# Patient Record
Sex: FEMALE | Race: BLACK OR AFRICAN AMERICAN | Employment: OTHER | URBAN - NONMETROPOLITAN AREA
[De-identification: names, ages, dates, MRNs, and addresses within clinical notes are randomized per-mention and may not be internally consistent; named-entity substitution may affect disease eponyms.]

---

## 2020-10-17 ENCOUNTER — HOSPITAL ENCOUNTER (OUTPATIENT)
Dept: LAB | Age: 75
Discharge: HOME | End: 2020-10-17
Payer: MEDICARE

## 2020-10-17 DIAGNOSIS — I11.0 HYPERTENSIVE HEART DISEASE WITH CONGESTIVE HEART FAILURE (HCC): ICD-10-CM

## 2020-10-17 LAB
ANION GAP SERPL CALC-SCNC: 12 MMOL/L
BASOPHILS # BLD: 0 K/UL (ref 0–0.1)
BASOPHILS NFR BLD: 0 % (ref 0–2)
BUN SERPL-MCNC: 16 MG/DL (ref 9–21)
BUN/CREAT SERPL: 8
CA-I BLD-MCNC: 7.7 MG/DL (ref 8.5–10.5)
CHLORIDE SERPL-SCNC: 92 MMOL/L (ref 94–111)
CO2 SERPL-SCNC: 36 MMOL/L (ref 21–33)
CREAT SERPL-MCNC: 2 MG/DL (ref 0.7–1.2)
EOSINOPHIL # BLD: 0.3 K/UL (ref 0–0.4)
EOSINOPHIL NFR BLD: 3 % (ref 0–5)
ERYTHROCYTE [DISTWIDTH] IN BLOOD BY AUTOMATED COUNT: 17.3 % (ref 11.6–14.5)
GLUCOSE SERPL-MCNC: 61 MG/DL (ref 70–110)
HCT VFR BLD AUTO: 36.5 % (ref 35–45)
HGB BLD-MCNC: 11 G/DL (ref 12–16)
IMM GRANULOCYTES # BLD AUTO: 0 K/UL
IMM GRANULOCYTES NFR BLD AUTO: 0 %
LYMPHOCYTES # BLD: 1.4 K/UL (ref 0.9–3.6)
LYMPHOCYTES NFR BLD: 13 % (ref 21–52)
MCH RBC QN AUTO: 27.6 PG (ref 24–34)
MCHC RBC AUTO-ENTMCNC: 30.1 G/DL (ref 31–37)
MCV RBC AUTO: 91.7 FL (ref 74–97)
MONOCYTES # BLD: 0.8 K/UL (ref 0.05–1.2)
MONOCYTES NFR BLD: 8 % (ref 3–10)
NEUTS SEG # BLD: 8 K/UL (ref 1.8–8)
NEUTS SEG NFR BLD: 76 % (ref 40–73)
PLATELET # BLD AUTO: 223 K/UL (ref 135–420)
PMV BLD AUTO: 10.9 FL (ref 9.2–11.8)
POTASSIUM SERPL-SCNC: 4.2 MMOL/L (ref 3.2–5.1)
RBC # BLD AUTO: 3.98 M/UL (ref 4.2–5.3)
SODIUM SERPL-SCNC: 140 MMOL/L (ref 135–145)
WBC # BLD AUTO: 10.6 K/UL (ref 4.6–13.2)

## 2020-10-17 PROCEDURE — 85025 COMPLETE CBC W/AUTO DIFF WBC: CPT

## 2020-10-17 PROCEDURE — 36415 COLL VENOUS BLD VENIPUNCTURE: CPT

## 2020-10-17 PROCEDURE — 80048 BASIC METABOLIC PNL TOTAL CA: CPT

## 2020-10-19 ENCOUNTER — TRANSCRIBE ORDER (OUTPATIENT)
Dept: REGISTRATION | Age: 75
End: 2020-10-19

## 2020-10-19 DIAGNOSIS — I11.0 HYPERTENSIVE HEART DISEASE WITH CONGESTIVE HEART FAILURE (HCC): Primary | ICD-10-CM

## 2023-05-25 ENCOUNTER — APPOINTMENT (OUTPATIENT)
Age: 78
DRG: 194 | End: 2023-05-25
Payer: COMMERCIAL

## 2023-05-25 ENCOUNTER — HOSPITAL ENCOUNTER (INPATIENT)
Age: 78
LOS: 4 days | Discharge: HOME | DRG: 194 | End: 2023-05-29
Attending: EMERGENCY MEDICINE | Admitting: INTERNAL MEDICINE
Payer: COMMERCIAL

## 2023-05-25 DIAGNOSIS — I50.9 ACUTE ON CHRONIC CONGESTIVE HEART FAILURE, UNSPECIFIED HEART FAILURE TYPE (HCC): ICD-10-CM

## 2023-05-25 DIAGNOSIS — J96.22 ACUTE ON CHRONIC RESPIRATORY FAILURE WITH HYPOXIA AND HYPERCAPNIA (HCC): Primary | ICD-10-CM

## 2023-05-25 DIAGNOSIS — J96.21 ACUTE ON CHRONIC RESPIRATORY FAILURE WITH HYPOXIA AND HYPERCAPNIA (HCC): Primary | ICD-10-CM

## 2023-05-25 DIAGNOSIS — N30.01 ACUTE CYSTITIS WITH HEMATURIA: ICD-10-CM

## 2023-05-25 PROBLEM — E66.9 DIABETES MELLITUS TYPE 2 IN OBESE (HCC): Status: ACTIVE | Noted: 2023-05-25

## 2023-05-25 PROBLEM — E11.69 DIABETES MELLITUS TYPE 2 IN OBESE (HCC): Status: ACTIVE | Noted: 2023-05-25

## 2023-05-25 PROBLEM — J96.00 ACUTE RESPIRATORY FAILURE (HCC): Status: ACTIVE | Noted: 2023-05-25

## 2023-05-25 PROBLEM — I50.33 ACUTE ON CHRONIC DIASTOLIC HEART FAILURE (HCC): Status: ACTIVE | Noted: 2023-05-25

## 2023-05-25 PROBLEM — E78.5 HYPERLIPIDEMIA: Status: ACTIVE | Noted: 2023-05-25

## 2023-05-25 PROBLEM — N30.00 ACUTE CYSTITIS: Status: ACTIVE | Noted: 2023-05-25

## 2023-05-25 LAB
ALBUMIN SERPL-MCNC: 2.7 G/DL (ref 3.4–5)
ALBUMIN/GLOB SERPL: 0.4 (ref 0.8–1.7)
ALP SERPL-CCNC: 107 U/L (ref 45–117)
ALT SERPL-CCNC: 12 U/L (ref 13–56)
AMPHET UR QL SCN: NEGATIVE
ANION GAP SERPL CALC-SCNC: 7 MMOL/L (ref 3–18)
APPEARANCE UR: ABNORMAL
ARTERIAL PATENCY WRIST A: YES
ARTERIAL PATENCY WRIST A: YES
AST SERPL W P-5'-P-CCNC: 14 U/L (ref 10–38)
BACTERIA URNS QL MICRO: ABNORMAL /HPF
BARBITURATES UR QL SCN: NEGATIVE
BASE EXCESS BLDA CALC-SCNC: 1.7 MMOL/L (ref 0–3)
BASE EXCESS BLDA CALC-SCNC: 4.4 MMOL/L (ref 0–3)
BASOPHILS # BLD: 0 K/UL (ref 0–0.1)
BASOPHILS NFR BLD: 0 % (ref 0–2)
BDY SITE: ABNORMAL
BDY SITE: ABNORMAL
BENZODIAZ UR QL: NEGATIVE
BILIRUB SERPL-MCNC: 0.3 MG/DL (ref 0.2–1)
BILIRUB UR QL: NEGATIVE
BNP SERPL-MCNC: 5331 PG/ML (ref 0–1800)
BREATHS.SPONTANEOUS ON VENT: 22
BREATHS.SPONTANEOUS ON VENT: 24
BUN SERPL-MCNC: 28 MG/DL (ref 7–18)
BUN/CREAT SERPL: 14 (ref 12–20)
CA-I BLD-MCNC: 8 MG/DL (ref 8.5–10.1)
CANNABINOIDS UR QL SCN: NEGATIVE
CHLORIDE SERPL-SCNC: 101 MMOL/L (ref 100–111)
CO2 SERPL-SCNC: 29 MMOL/L (ref 21–32)
COCAINE UR QL SCN: NEGATIVE
COHGB MFR BLD: 1.4 % (ref 1–2)
COHGB MFR BLD: 2.5 % (ref 1–2)
COLOR UR: YELLOW
CREAT SERPL-MCNC: 2 MG/DL (ref 0.6–1.3)
DIFFERENTIAL METHOD BLD: ABNORMAL
EOSINOPHIL # BLD: 0.2 K/UL (ref 0–0.4)
EOSINOPHIL NFR BLD: 2 % (ref 0–5)
EPITH CASTS URNS QL MICRO: ABNORMAL /LPF (ref 0–20)
ERYTHROCYTE [DISTWIDTH] IN BLOOD BY AUTOMATED COUNT: 16 % (ref 11.6–14.5)
ETHANOL SERPL-MCNC: <3 MG/DL (ref 0–3)
FENTANYL UR QL SCN: NEGATIVE
FIO2 ON VENT: 30 %
FIO2 ON VENT: 40 %
GAS FLOW.O2 SETTING OXYMISER: 20
GAS FLOW.O2 SETTING OXYMISER: 20
GLOBULIN SER CALC-MCNC: 6.4 G/DL (ref 2–4)
GLUCOSE BLD STRIP.AUTO-MCNC: 228 MG/DL (ref 70–110)
GLUCOSE SERPL-MCNC: 255 MG/DL (ref 74–99)
GLUCOSE UR STRIP.AUTO-MCNC: 100 MG/DL
HCO3 BLDA-SCNC: 32 MMOL/L (ref 22–26)
HCO3 BLDA-SCNC: 33 MMOL/L (ref 22–26)
HCT VFR BLD AUTO: 43.3 % (ref 35–45)
HGB BLD-MCNC: 12.4 G/DL (ref 12–16)
HGB UR QL STRIP: ABNORMAL
IMM GRANULOCYTES # BLD AUTO: 0.1 K/UL (ref 0–0.04)
IMM GRANULOCYTES NFR BLD AUTO: 1 % (ref 0–0.5)
IPAP/PIP: 28
IPAP/PIP: 32
KETONES UR QL STRIP.AUTO: NEGATIVE MG/DL
LACTATE SERPL-SCNC: 2.4 MMOL/L (ref 0.4–2)
LACTATE SERPL-SCNC: 5.7 MMOL/L (ref 0.4–2)
LEUKOCYTE ESTERASE UR QL STRIP.AUTO: ABNORMAL
LIPASE SERPL-CCNC: 93 U/L (ref 73–393)
LYMPHOCYTES # BLD: 4 K/UL (ref 0.9–3.6)
LYMPHOCYTES NFR BLD: 34 % (ref 21–52)
Lab: ABNORMAL
MCH RBC QN AUTO: 28.2 PG (ref 24–34)
MCHC RBC AUTO-ENTMCNC: 28.6 G/DL (ref 31–37)
MCV RBC AUTO: 98.4 FL (ref 78–100)
METHADONE UR QL: NEGATIVE
METHGB MFR BLD: 0.1 % (ref 0–1.4)
METHGB MFR BLD: 0.2 % (ref 0–1.4)
MONOCYTES # BLD: 1 K/UL (ref 0.05–1.2)
MONOCYTES NFR BLD: 8 % (ref 3–10)
NEUTS SEG # BLD: 6.6 K/UL (ref 1.8–8)
NEUTS SEG NFR BLD: 55 % (ref 40–73)
NITRITE UR QL STRIP.AUTO: NEGATIVE
NRBC # BLD: 0.31 K/UL (ref 0–0.01)
NRBC BLD-RTO: 2.6 PER 100 WBC
OPIATES UR QL: NEGATIVE
OXYCODONE UR QL SCN: NEGATIVE
OXYHGB MFR BLD: 89.3 % (ref 95–99)
OXYHGB MFR BLD: 89.8 % (ref 95–99)
PCO2 BLDA: 74 MMHG (ref 35–45)
PCO2 BLDA: 83 MMHG (ref 35–45)
PCP UR QL: NEGATIVE
PEEP RESPIRATORY: 10
PEEP RESPIRATORY: 6
PERFORMED BY:: ABNORMAL
PH BLDA: 7.21 (ref 7.35–7.45)
PH BLDA: 7.27 (ref 7.35–7.45)
PH UR STRIP: 5 (ref 5–8)
PLATELET # BLD AUTO: 244 K/UL (ref 135–420)
PMV BLD AUTO: 9.7 FL (ref 9.2–11.8)
PO2 BLDA: 67 MMHG (ref 80–100)
PO2 BLDA: 69 MMHG (ref 80–100)
POTASSIUM SERPL-SCNC: 5 MMOL/L (ref 3.5–5.5)
PROPOXYPH UR QL: NEGATIVE
PROT SERPL-MCNC: 9.1 G/DL (ref 6.4–8.2)
PROT UR STRIP-MCNC: 300 MG/DL
RBC # BLD AUTO: 4.4 M/UL (ref 4.2–5.3)
RBC #/AREA URNS HPF: ABNORMAL /HPF (ref 0–2)
RBC MORPH BLD: ABNORMAL
SAO2 % BLD: 91 % (ref 95–99)
SAO2 % BLD: 92 % (ref 95–99)
SAO2% DEVICE SAO2% SENSOR NAME: ABNORMAL
SAO2% DEVICE SAO2% SENSOR NAME: ABNORMAL
SERVICE CMNT-IMP: ABNORMAL
SODIUM SERPL-SCNC: 137 MMOL/L (ref 136–145)
SP GR UR REFRACTOMETRY: >1.03 (ref 1–1.03)
SPECIMEN SITE: ABNORMAL
SPECIMEN SITE: ABNORMAL
TRICYCLICS UR QL: POSITIVE
TROPONIN I SERPL HS-MCNC: 32 NG/L (ref 0–54)
TROPONIN I SERPL HS-MCNC: 33 NG/L (ref 0–54)
UROBILINOGEN UR QL STRIP.AUTO: 1 EU/DL (ref 0.2–1)
VENTILATION MODE VENT: ABNORMAL
VENTILATION MODE VENT: ABNORMAL
WBC # BLD AUTO: 11.9 K/UL (ref 4.6–13.2)
WBC URNS QL MICRO: ABNORMAL /HPF (ref 0–4)

## 2023-05-25 PROCEDURE — 94761 N-INVAS EAR/PLS OXIMETRY MLT: CPT

## 2023-05-25 PROCEDURE — 71045 X-RAY EXAM CHEST 1 VIEW: CPT

## 2023-05-25 PROCEDURE — 96365 THER/PROPH/DIAG IV INF INIT: CPT

## 2023-05-25 PROCEDURE — 80053 COMPREHEN METABOLIC PANEL: CPT

## 2023-05-25 PROCEDURE — 70450 CT HEAD/BRAIN W/O DYE: CPT

## 2023-05-25 PROCEDURE — 82077 ASSAY SPEC XCP UR&BREATH IA: CPT

## 2023-05-25 PROCEDURE — 1100000000 HC RM PRIVATE

## 2023-05-25 PROCEDURE — 87186 SC STD MICRODIL/AGAR DIL: CPT

## 2023-05-25 PROCEDURE — 71275 CT ANGIOGRAPHY CHEST: CPT

## 2023-05-25 PROCEDURE — 83690 ASSAY OF LIPASE: CPT

## 2023-05-25 PROCEDURE — 80307 DRUG TEST PRSMV CHEM ANLYZR: CPT

## 2023-05-25 PROCEDURE — 99285 EMERGENCY DEPT VISIT HI MDM: CPT

## 2023-05-25 PROCEDURE — 6360000004 HC RX CONTRAST MEDICATION: Performed by: EMERGENCY MEDICINE

## 2023-05-25 PROCEDURE — 81001 URINALYSIS AUTO W/SCOPE: CPT

## 2023-05-25 PROCEDURE — 2580000003 HC RX 258: Performed by: NURSE PRACTITIONER

## 2023-05-25 PROCEDURE — 5A09457 ASSISTANCE WITH RESPIRATORY VENTILATION, 24-96 CONSECUTIVE HOURS, CONTINUOUS POSITIVE AIRWAY PRESSURE: ICD-10-PCS | Performed by: INTERNAL MEDICINE

## 2023-05-25 PROCEDURE — 36415 COLL VENOUS BLD VENIPUNCTURE: CPT

## 2023-05-25 PROCEDURE — 84484 ASSAY OF TROPONIN QUANT: CPT

## 2023-05-25 PROCEDURE — 85025 COMPLETE CBC W/AUTO DIFF WBC: CPT

## 2023-05-25 PROCEDURE — 94660 CPAP INITIATION&MGMT: CPT

## 2023-05-25 PROCEDURE — 94640 AIRWAY INHALATION TREATMENT: CPT

## 2023-05-25 PROCEDURE — 6360000002 HC RX W HCPCS: Performed by: EMERGENCY MEDICINE

## 2023-05-25 PROCEDURE — 2580000003 HC RX 258: Performed by: EMERGENCY MEDICINE

## 2023-05-25 PROCEDURE — 83880 ASSAY OF NATRIURETIC PEPTIDE: CPT

## 2023-05-25 PROCEDURE — 96375 TX/PRO/DX INJ NEW DRUG ADDON: CPT

## 2023-05-25 PROCEDURE — 87086 URINE CULTURE/COLONY COUNT: CPT

## 2023-05-25 PROCEDURE — 87077 CULTURE AEROBIC IDENTIFY: CPT

## 2023-05-25 PROCEDURE — 96374 THER/PROPH/DIAG INJ IV PUSH: CPT

## 2023-05-25 PROCEDURE — 2700000000 HC OXYGEN THERAPY PER DAY

## 2023-05-25 PROCEDURE — 93005 ELECTROCARDIOGRAM TRACING: CPT | Performed by: EMERGENCY MEDICINE

## 2023-05-25 PROCEDURE — 36600 WITHDRAWAL OF ARTERIAL BLOOD: CPT

## 2023-05-25 PROCEDURE — 83605 ASSAY OF LACTIC ACID: CPT

## 2023-05-25 PROCEDURE — 6370000000 HC RX 637 (ALT 250 FOR IP): Performed by: EMERGENCY MEDICINE

## 2023-05-25 PROCEDURE — 82803 BLOOD GASES ANY COMBINATION: CPT

## 2023-05-25 PROCEDURE — 82962 GLUCOSE BLOOD TEST: CPT

## 2023-05-25 PROCEDURE — 87040 BLOOD CULTURE FOR BACTERIA: CPT

## 2023-05-25 RX ORDER — NALOXONE HYDROCHLORIDE 0.4 MG/ML
0.4 INJECTION, SOLUTION INTRAMUSCULAR; INTRAVENOUS; SUBCUTANEOUS
Status: DISCONTINUED | OUTPATIENT
Start: 2023-05-25 | End: 2023-05-25

## 2023-05-25 RX ORDER — CYCLOBENZAPRINE HCL 5 MG
2.5 TABLET ORAL EVERY 8 HOURS PRN
COMMUNITY
Start: 2023-05-23

## 2023-05-25 RX ORDER — SODIUM CHLORIDE 9 MG/ML
INJECTION, SOLUTION INTRAVENOUS PRN
Status: DISCONTINUED | OUTPATIENT
Start: 2023-05-25 | End: 2023-05-29 | Stop reason: HOSPADM

## 2023-05-25 RX ORDER — SODIUM CHLORIDE 0.9 % (FLUSH) 0.9 %
5-40 SYRINGE (ML) INJECTION PRN
Status: DISCONTINUED | OUTPATIENT
Start: 2023-05-25 | End: 2023-05-29 | Stop reason: HOSPADM

## 2023-05-25 RX ORDER — ACETAMINOPHEN 650 MG/1
650 SUPPOSITORY RECTAL EVERY 6 HOURS PRN
Status: DISCONTINUED | OUTPATIENT
Start: 2023-05-25 | End: 2023-05-29 | Stop reason: HOSPADM

## 2023-05-25 RX ORDER — POLYETHYLENE GLYCOL 3350 17 G/17G
17 POWDER, FOR SOLUTION ORAL DAILY PRN
Status: DISCONTINUED | OUTPATIENT
Start: 2023-05-25 | End: 2023-05-29 | Stop reason: HOSPADM

## 2023-05-25 RX ORDER — FUROSEMIDE 40 MG/1
40 TABLET ORAL EVERY MORNING
Status: ON HOLD | COMMUNITY
Start: 2023-03-23 | End: 2023-05-29 | Stop reason: SDUPTHER

## 2023-05-25 RX ORDER — FUROSEMIDE 10 MG/ML
60 INJECTION INTRAMUSCULAR; INTRAVENOUS ONCE
Status: COMPLETED | OUTPATIENT
Start: 2023-05-25 | End: 2023-05-25

## 2023-05-25 RX ORDER — SODIUM CHLORIDE 0.9 % (FLUSH) 0.9 %
5-40 SYRINGE (ML) INJECTION EVERY 12 HOURS SCHEDULED
Status: DISCONTINUED | OUTPATIENT
Start: 2023-05-25 | End: 2023-05-29 | Stop reason: HOSPADM

## 2023-05-25 RX ORDER — INSULIN LISPRO 100 [IU]/ML
0-4 INJECTION, SOLUTION INTRAVENOUS; SUBCUTANEOUS NIGHTLY
Status: DISCONTINUED | OUTPATIENT
Start: 2023-05-25 | End: 2023-05-29 | Stop reason: HOSPADM

## 2023-05-25 RX ORDER — ACETAMINOPHEN 325 MG/1
650 TABLET ORAL EVERY 6 HOURS PRN
Status: DISCONTINUED | OUTPATIENT
Start: 2023-05-25 | End: 2023-05-29 | Stop reason: HOSPADM

## 2023-05-25 RX ORDER — INSULIN LISPRO 100 [IU]/ML
0-8 INJECTION, SOLUTION INTRAVENOUS; SUBCUTANEOUS
Status: DISCONTINUED | OUTPATIENT
Start: 2023-05-26 | End: 2023-05-29 | Stop reason: HOSPADM

## 2023-05-25 RX ORDER — IPRATROPIUM BROMIDE AND ALBUTEROL SULFATE 2.5; .5 MG/3ML; MG/3ML
1 SOLUTION RESPIRATORY (INHALATION)
Status: COMPLETED | OUTPATIENT
Start: 2023-05-25 | End: 2023-05-25

## 2023-05-25 RX ORDER — 0.9 % SODIUM CHLORIDE 0.9 %
1000 INTRAVENOUS SOLUTION INTRAVENOUS ONCE
Status: DISCONTINUED | OUTPATIENT
Start: 2023-05-25 | End: 2023-05-25

## 2023-05-25 RX ORDER — ATORVASTATIN CALCIUM 20 MG/1
20 TABLET, FILM COATED ORAL
COMMUNITY
Start: 2022-08-18

## 2023-05-25 RX ORDER — FUROSEMIDE 10 MG/ML
40 INJECTION INTRAMUSCULAR; INTRAVENOUS 2 TIMES DAILY
Status: DISCONTINUED | OUTPATIENT
Start: 2023-05-26 | End: 2023-05-27 | Stop reason: ALTCHOICE

## 2023-05-25 RX ORDER — ONDANSETRON 4 MG/1
4 TABLET, ORALLY DISINTEGRATING ORAL EVERY 8 HOURS PRN
Status: DISCONTINUED | OUTPATIENT
Start: 2023-05-25 | End: 2023-05-29 | Stop reason: HOSPADM

## 2023-05-25 RX ORDER — NYSTATIN 100000 [USP'U]/G
100000 POWDER TOPICAL DAILY
COMMUNITY
Start: 2023-05-23

## 2023-05-25 RX ORDER — ATORVASTATIN CALCIUM 10 MG/1
20 TABLET, FILM COATED ORAL
Status: CANCELLED | OUTPATIENT
Start: 2023-05-26

## 2023-05-25 RX ORDER — NALOXONE HYDROCHLORIDE 0.4 MG/ML
INJECTION, SOLUTION INTRAMUSCULAR; INTRAVENOUS; SUBCUTANEOUS DAILY PRN
Status: COMPLETED | OUTPATIENT
Start: 2023-05-25 | End: 2023-05-25

## 2023-05-25 RX ORDER — ENOXAPARIN SODIUM 100 MG/ML
40 INJECTION SUBCUTANEOUS 2 TIMES DAILY
Status: DISCONTINUED | OUTPATIENT
Start: 2023-05-25 | End: 2023-05-29 | Stop reason: HOSPADM

## 2023-05-25 RX ORDER — NALOXONE HYDROCHLORIDE 0.4 MG/ML
2 INJECTION, SOLUTION INTRAMUSCULAR; INTRAVENOUS; SUBCUTANEOUS
Status: COMPLETED | OUTPATIENT
Start: 2023-05-25 | End: 2023-05-25

## 2023-05-25 RX ORDER — DEXTROSE MONOHYDRATE 100 MG/ML
INJECTION, SOLUTION INTRAVENOUS CONTINUOUS PRN
Status: DISCONTINUED | OUTPATIENT
Start: 2023-05-25 | End: 2023-05-29 | Stop reason: HOSPADM

## 2023-05-25 RX ORDER — GLIPIZIDE 5 MG/1
5 TABLET, FILM COATED, EXTENDED RELEASE ORAL DAILY
COMMUNITY
Start: 2023-03-24

## 2023-05-25 RX ORDER — ONDANSETRON 2 MG/ML
4 INJECTION INTRAMUSCULAR; INTRAVENOUS EVERY 6 HOURS PRN
Status: DISCONTINUED | OUTPATIENT
Start: 2023-05-25 | End: 2023-05-29 | Stop reason: HOSPADM

## 2023-05-25 RX ADMIN — NALOXONE HYDROCHLORIDE 2 MG: 0.4 INJECTION, SOLUTION INTRAMUSCULAR; INTRAVENOUS; SUBCUTANEOUS at 19:40

## 2023-05-25 RX ADMIN — SODIUM CHLORIDE, PRESERVATIVE FREE 10 ML: 5 INJECTION INTRAVENOUS at 23:48

## 2023-05-25 RX ADMIN — NALOXONE HYDROCHLORIDE 2 MG: 0.4 INJECTION, SOLUTION INTRAMUSCULAR; INTRAVENOUS; SUBCUTANEOUS at 19:06

## 2023-05-25 RX ADMIN — IOPAMIDOL 95 ML: 755 INJECTION, SOLUTION INTRAVENOUS at 19:34

## 2023-05-25 RX ADMIN — PIPERACILLIN AND TAZOBACTAM 4500 MG: 4; .5 INJECTION, POWDER, LYOPHILIZED, FOR SOLUTION INTRAVENOUS at 20:45

## 2023-05-25 RX ADMIN — VANCOMYCIN HYDROCHLORIDE 2000 MG: 1 INJECTION, POWDER, LYOPHILIZED, FOR SOLUTION INTRAVENOUS at 22:17

## 2023-05-25 RX ADMIN — IPRATROPIUM BROMIDE AND ALBUTEROL SULFATE 1 AMPULE: .5; 2.5 SOLUTION RESPIRATORY (INHALATION) at 20:17

## 2023-05-25 RX ADMIN — FUROSEMIDE 60 MG: 10 INJECTION, SOLUTION INTRAMUSCULAR; INTRAVENOUS at 20:45

## 2023-05-25 ASSESSMENT — PAIN SCALES - GENERAL: PAINLEVEL_OUTOF10: 6

## 2023-05-26 ENCOUNTER — APPOINTMENT (OUTPATIENT)
Age: 78
DRG: 194 | End: 2023-05-26
Payer: COMMERCIAL

## 2023-05-26 LAB
ANION GAP SERPL CALC-SCNC: 2 MMOL/L (ref 3–18)
ARTERIAL PATENCY WRIST A: YES
ARTERIAL PATENCY WRIST A: YES
BASE EXCESS BLDA CALC-SCNC: 4.3 MMOL/L (ref 0–3)
BASE EXCESS BLDA CALC-SCNC: 4.9 MMOL/L (ref 0–3)
BDY SITE: ABNORMAL
BDY SITE: ABNORMAL
BREATHS.SPONTANEOUS ON VENT: 20
BREATHS.SPONTANEOUS ON VENT: 24
BUN SERPL-MCNC: 28 MG/DL (ref 7–18)
BUN/CREAT SERPL: 15 (ref 12–20)
CA-I BLD-MCNC: 7.8 MG/DL (ref 8.5–10.1)
CHLORIDE SERPL-SCNC: 102 MMOL/L (ref 100–111)
CO2 SERPL-SCNC: 36 MMOL/L (ref 21–32)
COHGB MFR BLD: 1.3 % (ref 1–2)
COHGB MFR BLD: 1.5 % (ref 1–2)
CREAT SERPL-MCNC: 1.91 MG/DL (ref 0.6–1.3)
ECHO AO ASC DIAM: 3.7 CM
ECHO AO ASCENDING AORTA INDEX: 1.43 CM/M2
ECHO AO ROOT DIAM: 3.4 CM
ECHO AO ROOT INDEX: 1.31 CM/M2
ECHO AV AREA PEAK VELOCITY: 2.9 CM2
ECHO AV AREA VTI: 2.8 CM2
ECHO AV AREA/BSA PEAK VELOCITY: 1.1 CM2/M2
ECHO AV AREA/BSA VTI: 1.1 CM2/M2
ECHO AV MEAN GRADIENT: 5 MMHG
ECHO AV MEAN VELOCITY: 1.1 M/S
ECHO AV PEAK GRADIENT: 12 MMHG
ECHO AV PEAK VELOCITY: 1.7 M/S
ECHO AV VELOCITY RATIO: 0.71
ECHO AV VTI: 37 CM
ECHO BSA: 2.79 M2
ECHO EST RA PRESSURE: 8 MMHG
ECHO IVC PROX: 3 CM
ECHO LA AREA 4C: 29.3 CM2
ECHO LA DIAMETER INDEX: 1.66 CM/M2
ECHO LA DIAMETER: 4.3 CM
ECHO LA MAJOR AXIS: 7.5 CM
ECHO LA TO AORTIC ROOT RATIO: 1.26
ECHO LA VOL 4C: 94 ML (ref 22–52)
ECHO LA VOLUME INDEX A4C: 36 ML/M2 (ref 16–34)
ECHO LV E' LATERAL VELOCITY: 7 CM/S
ECHO LV E' SEPTAL VELOCITY: 5 CM/S
ECHO LV FRACTIONAL SHORTENING: 33 % (ref 28–44)
ECHO LV INTERNAL DIMENSION DIASTOLE INDEX: 1.85 CM/M2
ECHO LV INTERNAL DIMENSION DIASTOLIC: 4.8 CM (ref 3.9–5.3)
ECHO LV INTERNAL DIMENSION SYSTOLIC INDEX: 1.24 CM/M2
ECHO LV INTERNAL DIMENSION SYSTOLIC: 3.2 CM
ECHO LV IVSD: 1 CM (ref 0.6–0.9)
ECHO LV MASS 2D: 181.9 G (ref 67–162)
ECHO LV MASS INDEX 2D: 70.2 G/M2 (ref 43–95)
ECHO LV POSTERIOR WALL DIASTOLIC: 1.1 CM (ref 0.6–0.9)
ECHO LV RELATIVE WALL THICKNESS RATIO: 0.46
ECHO LVOT AREA: 4.2 CM2
ECHO LVOT AV VTI INDEX: 0.68
ECHO LVOT DIAM: 2.3 CM
ECHO LVOT MEAN GRADIENT: 3 MMHG
ECHO LVOT PEAK GRADIENT: 6 MMHG
ECHO LVOT PEAK VELOCITY: 1.2 M/S
ECHO LVOT STROKE VOLUME INDEX: 40.4 ML/M2
ECHO LVOT SV: 104.6 ML
ECHO LVOT VTI: 25.2 CM
ECHO MV A VELOCITY: 1.09 M/S
ECHO MV AREA VTI: 3.3 CM2
ECHO MV E DECELERATION TIME (DT): 313 MS
ECHO MV E VELOCITY: 0.93 M/S
ECHO MV E/A RATIO: 0.85
ECHO MV E/E' LATERAL: 13.29
ECHO MV E/E' RATIO (AVERAGED): 15.94
ECHO MV E/E' SEPTAL: 18.6
ECHO MV LVOT VTI INDEX: 1.27
ECHO MV MAX VELOCITY: 1.3 M/S
ECHO MV MEAN GRADIENT: 2 MMHG
ECHO MV MEAN VELOCITY: 0.6 M/S
ECHO MV PEAK GRADIENT: 6 MMHG
ECHO MV VTI: 32 CM
ECHO PV MAX VELOCITY: 1 M/S
ECHO PV PEAK GRADIENT: 4 MMHG
ECHO RA AREA 4C: 31.2 CM2
ECHO RA END SYSTOLIC VOLUME APICAL 4 CHAMBER INDEX BSA: 49 ML/M2
ECHO RA VOLUME: 127 ML
ECHO RIGHT VENTRICULAR SYSTOLIC PRESSURE (RVSP): 51 MMHG
ECHO RV BASAL DIMENSION: 4.7 CM
ECHO RV GLOBAL SYSTOLIC STRAIN (GLS): -19.6 %
ECHO RV LONGITUDINAL DIMENSION: 7.3 CM
ECHO RV MID DIMENSION: 3.8 CM
ECHO RV TAPSE: 2.3 CM (ref 1.7–?)
ECHO TV REGURGITANT MAX VELOCITY: 3.27 M/S
ECHO TV REGURGITANT PEAK GRADIENT: 43 MMHG
EKG ATRIAL RATE: 98 BPM
EKG DIAGNOSIS: NORMAL
EKG P AXIS: 32 DEGREES
EKG P-R INTERVAL: 176 MS
EKG Q-T INTERVAL: 358 MS
EKG QRS DURATION: 94 MS
EKG QTC CALCULATION (BAZETT): 457 MS
EKG R AXIS: 17 DEGREES
EKG T AXIS: 87 DEGREES
EKG VENTRICULAR RATE: 98 BPM
ERYTHROCYTE [DISTWIDTH] IN BLOOD BY AUTOMATED COUNT: 15.9 % (ref 11.6–14.5)
FIO2 ON VENT: 30 %
FIO2 ON VENT: 30 %
GAS FLOW.O2 SETTING OXYMISER: 20
GAS FLOW.O2 SETTING OXYMISER: 20
GLUCOSE BLD STRIP.AUTO-MCNC: 103 MG/DL (ref 70–110)
GLUCOSE BLD STRIP.AUTO-MCNC: 105 MG/DL (ref 70–110)
GLUCOSE BLD STRIP.AUTO-MCNC: 136 MG/DL (ref 70–110)
GLUCOSE BLD STRIP.AUTO-MCNC: 97 MG/DL (ref 70–110)
GLUCOSE SERPL-MCNC: 155 MG/DL (ref 74–99)
HCO3 BLDA-SCNC: 33 MMOL/L (ref 22–26)
HCO3 BLDA-SCNC: 34 MMOL/L (ref 22–26)
HCT VFR BLD AUTO: 40 % (ref 35–45)
HGB BLD-MCNC: 11.5 G/DL (ref 12–16)
IPAP/PIP: 30
IPAP/PIP: 32
LACTATE SERPL-SCNC: 1.6 MMOL/L (ref 0.4–2)
MAGNESIUM SERPL-MCNC: 2.8 MG/DL (ref 1.6–2.6)
MCH RBC QN AUTO: 27.7 PG (ref 24–34)
MCHC RBC AUTO-ENTMCNC: 28.8 G/DL (ref 31–37)
MCV RBC AUTO: 96.4 FL (ref 78–100)
METHGB MFR BLD: 0.1 % (ref 0–1.4)
METHGB MFR BLD: 0.2 % (ref 0–1.4)
NRBC # BLD: 0.04 K/UL (ref 0–0.01)
NRBC BLD-RTO: 0.9 PER 100 WBC
OXYHGB MFR BLD: 93.2 % (ref 95–99)
OXYHGB MFR BLD: 93.4 % (ref 95–99)
PCO2 BLDA: 69 MMHG (ref 35–45)
PCO2 BLDA: 76 MMHG (ref 35–45)
PEEP RESPIRATORY: 12
PEEP RESPIRATORY: 12
PERFORMED BY:: ABNORMAL
PERFORMED BY:: NORMAL
PH BLDA: 7.26 (ref 7.35–7.45)
PH BLDA: 7.3 (ref 7.35–7.45)
PLATELET # BLD AUTO: 220 K/UL (ref 135–420)
PMV BLD AUTO: 9.6 FL (ref 9.2–11.8)
PO2 BLDA: 78 MMHG (ref 80–100)
PO2 BLDA: 81 MMHG (ref 80–100)
POTASSIUM SERPL-SCNC: 5.1 MMOL/L (ref 3.5–5.5)
RBC # BLD AUTO: 4.15 M/UL (ref 4.2–5.3)
SAO2 % BLD: 95 % (ref 95–99)
SAO2 % BLD: 95 % (ref 95–99)
SAO2% DEVICE SAO2% SENSOR NAME: ABNORMAL
SAO2% DEVICE SAO2% SENSOR NAME: ABNORMAL
SODIUM SERPL-SCNC: 140 MMOL/L (ref 136–145)
SPECIMEN SITE: ABNORMAL
SPECIMEN SITE: ABNORMAL
TROPONIN I SERPL HS-MCNC: 35 NG/L (ref 0–54)
VENTILATION MODE VENT: ABNORMAL
VENTILATION MODE VENT: ABNORMAL
WBC # BLD AUTO: 4.6 K/UL (ref 4.6–13.2)

## 2023-05-26 PROCEDURE — 85027 COMPLETE CBC AUTOMATED: CPT

## 2023-05-26 PROCEDURE — 36415 COLL VENOUS BLD VENIPUNCTURE: CPT

## 2023-05-26 PROCEDURE — 80048 BASIC METABOLIC PNL TOTAL CA: CPT

## 2023-05-26 PROCEDURE — 36600 WITHDRAWAL OF ARTERIAL BLOOD: CPT

## 2023-05-26 PROCEDURE — 84484 ASSAY OF TROPONIN QUANT: CPT

## 2023-05-26 PROCEDURE — 94660 CPAP INITIATION&MGMT: CPT

## 2023-05-26 PROCEDURE — 82962 GLUCOSE BLOOD TEST: CPT

## 2023-05-26 PROCEDURE — 94761 N-INVAS EAR/PLS OXIMETRY MLT: CPT

## 2023-05-26 PROCEDURE — 2700000000 HC OXYGEN THERAPY PER DAY

## 2023-05-26 PROCEDURE — 2580000003 HC RX 258: Performed by: NURSE PRACTITIONER

## 2023-05-26 PROCEDURE — 6360000002 HC RX W HCPCS: Performed by: INTERNAL MEDICINE

## 2023-05-26 PROCEDURE — 93306 TTE W/DOPPLER COMPLETE: CPT

## 2023-05-26 PROCEDURE — 83735 ASSAY OF MAGNESIUM: CPT

## 2023-05-26 PROCEDURE — 0JBQ0ZZ EXCISION OF RIGHT FOOT SUBCUTANEOUS TISSUE AND FASCIA, OPEN APPROACH: ICD-10-PCS | Performed by: PODIATRIST

## 2023-05-26 PROCEDURE — 1100000000 HC RM PRIVATE

## 2023-05-26 PROCEDURE — 83605 ASSAY OF LACTIC ACID: CPT

## 2023-05-26 PROCEDURE — 6360000002 HC RX W HCPCS: Performed by: NURSE PRACTITIONER

## 2023-05-26 PROCEDURE — 73630 X-RAY EXAM OF FOOT: CPT

## 2023-05-26 PROCEDURE — 82803 BLOOD GASES ANY COMBINATION: CPT

## 2023-05-26 RX ORDER — DIPHENHYDRAMINE HYDROCHLORIDE 50 MG/ML
12.5 INJECTION INTRAMUSCULAR; INTRAVENOUS EVERY 6 HOURS PRN
Status: DISCONTINUED | OUTPATIENT
Start: 2023-05-26 | End: 2023-05-27 | Stop reason: ALTCHOICE

## 2023-05-26 RX ORDER — ASPIRIN 81 MG/1
81 TABLET, CHEWABLE ORAL DAILY
Status: DISCONTINUED | OUTPATIENT
Start: 2023-05-26 | End: 2023-05-29 | Stop reason: HOSPADM

## 2023-05-26 RX ADMIN — DIPHENHYDRAMINE HYDROCHLORIDE 12.5 MG: 50 INJECTION, SOLUTION INTRAMUSCULAR; INTRAVENOUS at 21:34

## 2023-05-26 RX ADMIN — ENOXAPARIN SODIUM 40 MG: 40 INJECTION SUBCUTANEOUS at 21:31

## 2023-05-26 RX ADMIN — FUROSEMIDE 40 MG: 10 INJECTION, SOLUTION INTRAMUSCULAR; INTRAVENOUS at 17:33

## 2023-05-26 RX ADMIN — DIPHENHYDRAMINE HYDROCHLORIDE 12.5 MG: 50 INJECTION, SOLUTION INTRAMUSCULAR; INTRAVENOUS at 14:38

## 2023-05-26 RX ADMIN — ENOXAPARIN SODIUM 40 MG: 40 INJECTION SUBCUTANEOUS at 09:10

## 2023-05-26 RX ADMIN — SODIUM CHLORIDE, PRESERVATIVE FREE 10 ML: 5 INJECTION INTRAVENOUS at 07:45

## 2023-05-26 RX ADMIN — SODIUM CHLORIDE, PRESERVATIVE FREE 10 ML: 5 INJECTION INTRAVENOUS at 21:33

## 2023-05-26 RX ADMIN — CEFTRIAXONE SODIUM 1000 MG: 1 INJECTION, POWDER, FOR SOLUTION INTRAMUSCULAR; INTRAVENOUS at 01:31

## 2023-05-26 RX ADMIN — FUROSEMIDE 40 MG: 10 INJECTION, SOLUTION INTRAMUSCULAR; INTRAVENOUS at 09:10

## 2023-05-26 ASSESSMENT — PAIN SCALES - GENERAL
PAINLEVEL_OUTOF10: 0
PAINLEVEL_OUTOF10: 0
PAINLEVEL_OUTOF10: 6
PAINLEVEL_OUTOF10: 0

## 2023-05-27 LAB
ARTERIAL PATENCY WRIST A: YES
BASE EXCESS BLDA CALC-SCNC: 6.7 MMOL/L (ref 0–3)
BDY SITE: ABNORMAL
BREATHS.SPONTANEOUS ON VENT: 24
COHGB MFR BLD: 1.6 % (ref 1–2)
FIO2 ON VENT: 30 %
GAS FLOW.O2 SETTING OXYMISER: 20
GLUCOSE BLD STRIP.AUTO-MCNC: 100 MG/DL (ref 70–110)
GLUCOSE BLD STRIP.AUTO-MCNC: 101 MG/DL (ref 70–110)
GLUCOSE BLD STRIP.AUTO-MCNC: 106 MG/DL (ref 70–110)
GLUCOSE BLD STRIP.AUTO-MCNC: 150 MG/DL (ref 70–110)
HCO3 BLDA-SCNC: 34 MMOL/L (ref 22–26)
IPAP/PIP: 31
METHGB MFR BLD: 0.2 % (ref 0–1.4)
OXYHGB MFR BLD: 95.8 % (ref 95–99)
PCO2 BLDA: 64 MMHG (ref 35–45)
PEEP RESPIRATORY: 12
PERFORMED BY:: ABNORMAL
PERFORMED BY:: ABNORMAL
PERFORMED BY:: NORMAL
PH BLDA: 7.35 (ref 7.35–7.45)
PO2 BLDA: 101 MMHG (ref 80–100)
SAO2 % BLD: 98 % (ref 95–99)
SAO2% DEVICE SAO2% SENSOR NAME: ABNORMAL
SPECIMEN SITE: ABNORMAL
VENTILATION MODE VENT: ABNORMAL
VT SETTING VENT: 420

## 2023-05-27 PROCEDURE — 82803 BLOOD GASES ANY COMBINATION: CPT

## 2023-05-27 PROCEDURE — 94660 CPAP INITIATION&MGMT: CPT

## 2023-05-27 PROCEDURE — 2700000000 HC OXYGEN THERAPY PER DAY

## 2023-05-27 PROCEDURE — 6370000000 HC RX 637 (ALT 250 FOR IP): Performed by: INTERNAL MEDICINE

## 2023-05-27 PROCEDURE — 36600 WITHDRAWAL OF ARTERIAL BLOOD: CPT

## 2023-05-27 PROCEDURE — 94761 N-INVAS EAR/PLS OXIMETRY MLT: CPT

## 2023-05-27 PROCEDURE — 2580000003 HC RX 258: Performed by: NURSE PRACTITIONER

## 2023-05-27 PROCEDURE — 82962 GLUCOSE BLOOD TEST: CPT

## 2023-05-27 PROCEDURE — 6360000002 HC RX W HCPCS: Performed by: NURSE PRACTITIONER

## 2023-05-27 PROCEDURE — 1100000000 HC RM PRIVATE

## 2023-05-27 PROCEDURE — 6370000000 HC RX 637 (ALT 250 FOR IP): Performed by: NURSE PRACTITIONER

## 2023-05-27 RX ORDER — DIPHENHYDRAMINE HCL 25 MG
12.5 TABLET ORAL EVERY 6 HOURS PRN
Status: DISCONTINUED | OUTPATIENT
Start: 2023-05-27 | End: 2023-05-29 | Stop reason: HOSPADM

## 2023-05-27 RX ORDER — FUROSEMIDE 40 MG/1
40 TABLET ORAL
Status: COMPLETED | OUTPATIENT
Start: 2023-05-27 | End: 2023-05-27

## 2023-05-27 RX ORDER — FUROSEMIDE 40 MG/1
40 TABLET ORAL DAILY
Status: DISCONTINUED | OUTPATIENT
Start: 2023-05-27 | End: 2023-05-27

## 2023-05-27 RX ORDER — FUROSEMIDE 40 MG/1
40 TABLET ORAL 2 TIMES DAILY
Status: DISCONTINUED | OUTPATIENT
Start: 2023-05-28 | End: 2023-05-27

## 2023-05-27 RX ADMIN — FUROSEMIDE 40 MG: 40 TABLET ORAL at 10:57

## 2023-05-27 RX ADMIN — DIPHENHYDRAMINE HYDROCHLORIDE 12.5 MG: 25 TABLET ORAL at 20:04

## 2023-05-27 RX ADMIN — DIPHENHYDRAMINE HYDROCHLORIDE 12.5 MG: 25 TABLET ORAL at 10:58

## 2023-05-27 RX ADMIN — ENOXAPARIN SODIUM 40 MG: 40 INJECTION SUBCUTANEOUS at 20:38

## 2023-05-27 RX ADMIN — CEFTRIAXONE SODIUM 1000 MG: 1 INJECTION, POWDER, FOR SOLUTION INTRAMUSCULAR; INTRAVENOUS at 01:56

## 2023-05-27 RX ADMIN — FUROSEMIDE 40 MG: 10 INJECTION, SOLUTION INTRAMUSCULAR; INTRAVENOUS at 09:17

## 2023-05-27 RX ADMIN — ENOXAPARIN SODIUM 40 MG: 40 INJECTION SUBCUTANEOUS at 09:17

## 2023-05-27 RX ADMIN — SODIUM CHLORIDE, PRESERVATIVE FREE 10 ML: 5 INJECTION INTRAVENOUS at 09:17

## 2023-05-27 RX ADMIN — ASPIRIN 81 MG 81 MG: 81 TABLET ORAL at 09:17

## 2023-05-27 ASSESSMENT — PAIN SCALES - GENERAL
PAINLEVEL_OUTOF10: 0

## 2023-05-28 LAB
ANION GAP SERPL CALC-SCNC: 6 MMOL/L (ref 3–18)
BACTERIA SPEC CULT: ABNORMAL
BUN SERPL-MCNC: 27 MG/DL (ref 7–18)
BUN/CREAT SERPL: 15 (ref 12–20)
CA-I BLD-MCNC: 7.7 MG/DL (ref 8.5–10.1)
CHLORIDE SERPL-SCNC: 102 MMOL/L (ref 100–111)
CO2 SERPL-SCNC: 35 MMOL/L (ref 21–32)
COLONY COUNT, CNT: ABNORMAL
CREAT SERPL-MCNC: 1.79 MG/DL (ref 0.6–1.3)
ERYTHROCYTE [DISTWIDTH] IN BLOOD BY AUTOMATED COUNT: 15.9 % (ref 11.6–14.5)
GLUCOSE BLD STRIP.AUTO-MCNC: 124 MG/DL (ref 70–110)
GLUCOSE BLD STRIP.AUTO-MCNC: 146 MG/DL (ref 70–110)
GLUCOSE BLD STRIP.AUTO-MCNC: 157 MG/DL (ref 70–110)
GLUCOSE BLD STRIP.AUTO-MCNC: 211 MG/DL (ref 70–110)
GLUCOSE SERPL-MCNC: 187 MG/DL (ref 74–99)
HCT VFR BLD AUTO: 37.1 % (ref 35–45)
HGB BLD-MCNC: 10.9 G/DL (ref 12–16)
Lab: ABNORMAL
MAGNESIUM SERPL-MCNC: 2.3 MG/DL (ref 1.6–2.6)
MCH RBC QN AUTO: 27.8 PG (ref 24–34)
MCHC RBC AUTO-ENTMCNC: 29.4 G/DL (ref 31–37)
MCV RBC AUTO: 94.6 FL (ref 78–100)
NRBC # BLD: 0.02 K/UL (ref 0–0.01)
NRBC BLD-RTO: 0.1 PER 100 WBC
PERFORMED BY:: ABNORMAL
PLATELET # BLD AUTO: 223 K/UL (ref 135–420)
PMV BLD AUTO: 9.4 FL (ref 9.2–11.8)
POTASSIUM SERPL-SCNC: 4 MMOL/L (ref 3.5–5.5)
RBC # BLD AUTO: 3.92 M/UL (ref 4.2–5.3)
SODIUM SERPL-SCNC: 143 MMOL/L (ref 136–145)
WBC # BLD AUTO: 24.8 K/UL (ref 4.6–13.2)

## 2023-05-28 PROCEDURE — 94761 N-INVAS EAR/PLS OXIMETRY MLT: CPT

## 2023-05-28 PROCEDURE — 6370000000 HC RX 637 (ALT 250 FOR IP): Performed by: NURSE PRACTITIONER

## 2023-05-28 PROCEDURE — 80048 BASIC METABOLIC PNL TOTAL CA: CPT

## 2023-05-28 PROCEDURE — 2700000000 HC OXYGEN THERAPY PER DAY

## 2023-05-28 PROCEDURE — 6370000000 HC RX 637 (ALT 250 FOR IP): Performed by: INTERNAL MEDICINE

## 2023-05-28 PROCEDURE — 85027 COMPLETE CBC AUTOMATED: CPT

## 2023-05-28 PROCEDURE — 1100000000 HC RM PRIVATE

## 2023-05-28 PROCEDURE — 6360000002 HC RX W HCPCS: Performed by: NURSE PRACTITIONER

## 2023-05-28 PROCEDURE — 83735 ASSAY OF MAGNESIUM: CPT

## 2023-05-28 PROCEDURE — 82962 GLUCOSE BLOOD TEST: CPT

## 2023-05-28 RX ORDER — CARBOXYMETHYLCELLULOSE SODIUM 5 MG/ML
1 SOLUTION/ DROPS OPHTHALMIC PRN
Status: DISCONTINUED | OUTPATIENT
Start: 2023-05-28 | End: 2023-05-29 | Stop reason: HOSPADM

## 2023-05-28 RX ORDER — SULFAMETHOXAZOLE AND TRIMETHOPRIM 800; 160 MG/1; MG/1
1 TABLET ORAL EVERY 12 HOURS SCHEDULED
Status: DISCONTINUED | OUTPATIENT
Start: 2023-05-28 | End: 2023-05-29

## 2023-05-28 RX ADMIN — FUROSEMIDE 60 MG: 40 TABLET ORAL at 08:12

## 2023-05-28 RX ADMIN — CARBOXYMETHYLCELLULOSE SODIUM 1 DROP: 5 SOLUTION/ DROPS OPHTHALMIC at 11:23

## 2023-05-28 RX ADMIN — DIPHENHYDRAMINE HYDROCHLORIDE 12.5 MG: 25 TABLET ORAL at 11:24

## 2023-05-28 RX ADMIN — SULFAMETHOXAZOLE AND TRIMETHOPRIM 1 TABLET: 800; 160 TABLET ORAL at 11:24

## 2023-05-28 RX ADMIN — DIPHENHYDRAMINE HYDROCHLORIDE 12.5 MG: 25 TABLET ORAL at 20:55

## 2023-05-28 RX ADMIN — FUROSEMIDE 60 MG: 40 TABLET ORAL at 17:20

## 2023-05-28 RX ADMIN — ENOXAPARIN SODIUM 40 MG: 40 INJECTION SUBCUTANEOUS at 08:44

## 2023-05-28 RX ADMIN — SULFAMETHOXAZOLE AND TRIMETHOPRIM 1 TABLET: 800; 160 TABLET ORAL at 20:45

## 2023-05-28 RX ADMIN — DIPHENHYDRAMINE HYDROCHLORIDE 12.5 MG: 25 TABLET ORAL at 02:13

## 2023-05-28 RX ADMIN — ASPIRIN 81 MG 81 MG: 81 TABLET ORAL at 08:13

## 2023-05-28 RX ADMIN — ENOXAPARIN SODIUM 40 MG: 40 INJECTION SUBCUTANEOUS at 20:43

## 2023-05-28 ASSESSMENT — PAIN SCALES - GENERAL: PAINLEVEL_OUTOF10: 0

## 2023-05-29 VITALS
DIASTOLIC BLOOD PRESSURE: 51 MMHG | TEMPERATURE: 96.9 F | HEIGHT: 66 IN | HEART RATE: 89 BPM | BODY MASS INDEX: 47.09 KG/M2 | OXYGEN SATURATION: 99 % | RESPIRATION RATE: 20 BRPM | WEIGHT: 293 LBS | SYSTOLIC BLOOD PRESSURE: 105 MMHG

## 2023-05-29 LAB
GLUCOSE BLD STRIP.AUTO-MCNC: 121 MG/DL (ref 70–110)
GLUCOSE BLD STRIP.AUTO-MCNC: 131 MG/DL (ref 70–110)
PERFORMED BY:: ABNORMAL
PERFORMED BY:: ABNORMAL

## 2023-05-29 PROCEDURE — 6360000002 HC RX W HCPCS: Performed by: NURSE PRACTITIONER

## 2023-05-29 PROCEDURE — 2700000000 HC OXYGEN THERAPY PER DAY

## 2023-05-29 PROCEDURE — 51702 INSERT TEMP BLADDER CATH: CPT

## 2023-05-29 PROCEDURE — 6370000000 HC RX 637 (ALT 250 FOR IP): Performed by: NURSE PRACTITIONER

## 2023-05-29 PROCEDURE — 94761 N-INVAS EAR/PLS OXIMETRY MLT: CPT

## 2023-05-29 PROCEDURE — 82962 GLUCOSE BLOOD TEST: CPT

## 2023-05-29 RX ORDER — CIPROFLOXACIN 500 MG/1
500 TABLET, FILM COATED ORAL 2 TIMES DAILY
Qty: 10 TABLET | Refills: 0 | Status: SHIPPED | OUTPATIENT
Start: 2023-05-29 | End: 2023-06-03

## 2023-05-29 RX ORDER — CARVEDILOL 12.5 MG/1
12.5 TABLET ORAL 2 TIMES DAILY
Qty: 60 TABLET | Refills: 3 | Status: SHIPPED | OUTPATIENT
Start: 2023-05-29

## 2023-05-29 RX ORDER — FUROSEMIDE 40 MG/1
40 TABLET ORAL 2 TIMES DAILY
Status: DISCONTINUED | OUTPATIENT
Start: 2023-05-29 | End: 2023-05-29 | Stop reason: HOSPADM

## 2023-05-29 RX ORDER — ASPIRIN 81 MG/1
81 TABLET, CHEWABLE ORAL DAILY
Qty: 30 TABLET | Refills: 3 | Status: SHIPPED | OUTPATIENT
Start: 2023-05-29

## 2023-05-29 RX ORDER — FUROSEMIDE 40 MG/1
40 TABLET ORAL 2 TIMES DAILY
Qty: 60 TABLET | Refills: 3 | Status: SHIPPED | OUTPATIENT
Start: 2023-05-29

## 2023-05-29 RX ADMIN — ENOXAPARIN SODIUM 40 MG: 40 INJECTION SUBCUTANEOUS at 10:16

## 2023-05-29 RX ADMIN — ASPIRIN 81 MG 81 MG: 81 TABLET ORAL at 10:16

## 2023-05-29 ASSESSMENT — PAIN SCALES - GENERAL: PAINLEVEL_OUTOF10: 0

## 2023-05-29 NOTE — DISCHARGE SUMMARY
0. 0  WBC    nRBC 0.02 (H) 0.00 - 0.01 K/uL   POCT Glucose    Collection Time: 05/28/23 11:22 AM   Result Value Ref Range    POC Glucose 157 (H) 70 - 110 mg/dL    Performed by: Annika Gonzalez    POCT Glucose    Collection Time: 05/28/23  4:18 PM   Result Value Ref Range    POC Glucose 146 (H) 70 - 110 mg/dL    Performed by: Anniak Gonzalez    POCT Glucose    Collection Time: 05/28/23  8:26 PM   Result Value Ref Range    POC Glucose 211 (H) 70 - 110 mg/dL    Performed by: Susan Colindres    POCT Glucose    Collection Time: 05/29/23  7:21 AM   Result Value Ref Range    POC Glucose 121 (H) 70 - 110 mg/dL    Performed by: Annika Gonzalez      Recent Labs     05/28/23  0900   GLUCOSE 187*   BUN 27*   CALCIUM 7.7*      CO2 35*     Recent Labs     05/28/23  0900   WBC 24.8*   RBC 3.92*   HCT 37.1   MCV 94.6   MCH 27.8   MCHC 29.4*   RDW 15.9*         Last 3 CBC:   Recent Labs     05/28/23  0900   WBC 24.8*   RBC 3.92*   HGB 10.9*   HCT 37.1   MCV 94.6   MCH 27.8   MCHC 29.4*   RDW 15.9*      MPV 9.4      Last 3 BMP:   Recent Labs     05/28/23  0900      K 4.0      CO2 35*   BUN 27*   CREATININE 1.79*   GLUCOSE 187*   CALCIUM 7.7*      Last 3 CMP:   Recent Labs     05/28/23  0900      K 4.0      CO2 35*   BUN 27*   CREATININE 1.79*   GLUCOSE 187*   CALCIUM 7.7*            Imaging results impression only:  XR FOOT RIGHT (MIN 3 VIEWS)    Result Date: 5/26/2023  Severe osteopenia. No evidence of osteomyelitis. CT Head W/O Contrast    Result Date: 5/25/2023  No acute abnormality. CTA CHEST W WO CONTRAST    Result Date: 5/25/2023  1. No evidence for acute central pulmonary embolism. Suboptimal evaluation of the lobar and segmental arteries due to poor contrast opacification and artifact. 2. 13 mm right upper lobe lung nodule. Recommend follow-up in 3 months. 3. Questionable small right pleural effusion. 4. Minimal aneurysmal dilatation of the ascending aorta.       XR CHEST

## 2023-05-29 NOTE — PROGRESS NOTES
0528-Verbal report received from [facility] for patient coming to 245 from ICU. Pt arrived to unit at [address], assist with bed transfer. Pt alert and orient x4, denies pain, wound to right heel noted with dressing in placed, continue on oxygen at 2L via nasal canula. 0700- Shift report given to oncoming nurse at bedside.

## 2023-05-29 NOTE — PROGRESS NOTES
Dr Eamon Smith did not request a 6 min walk since pt was not mobile only up to toilet otherwise she is in wheelchair with help from family. Sats on room air in bed is 90%. No resp distress, she is breathy when talking but does not claim she is SOB.

## 2023-05-29 NOTE — PROGRESS NOTES
0700- Assumed care of patient. Received shift report from night shift nurse.     4571- Pt VSS. Blood glucose of 121; insulin held. Pt has no complaints at this time. 0800- Pt set up with breakfast tray. Niurka ALCALA in to see patient. Pt cleared for discharge. 1016- Scheduled morning medications given. 1230- Catheter removed. Telemetry discontinued. Pt awaiting ride and clothing. 1430- Pt voided small amount of urine into brief. Bladder scanner determined 15 mL of urine left in the bladder. 1500- Pt discharged from facility with all belongings.

## 2023-05-29 NOTE — PROGRESS NOTES
1845- Bedside shift change report given to Holly Wu RN (oncoming nurse) by Alexus Giron RN (offgoing nurse). Report included the following information Nurse Handoff Report, Index, ED Encounter Summary, ED SBAR, Intake/Output, MAR, Recent Results, and Cardiac Rhythm SR w/ PVCs . Received patient resting in bed, watching tv, sons at bedside. Patient on 2L NC, respirations even and unlabored, VSS. Bed in lowest and locked position, CBWR.     1930- This RN to bedside for shift assessment. Patient denies pain or needs at this time. Patient requests repositioning during med pass at 2100.     2045- Medications given at this time, insulin to be held due to order parameters not met. Dressing change competed     2230- Patient rests in bed with eyes closed. Respirations even and unlabored, no evidence of distress or discomfort. 0015- Reassessment completed at this time, no changes noted. NC remains on 2L. VSS    0215- Patient continues to rest in bed with eyes closed. Respirations even and unlabored, no evidence of distress or discomfort at this time. 0400- Reassessment completed at this time, no changes noted. Respirations even and unlabored, VSS.     0528- TRANSFER - OUT REPORT:    Verbal report given to VINAYAK Dale on [address] Grant  being transferred to [facility] room 245 for routine progression of patient care       Report consisted of patient's Situation, Background, Assessment and   Recommendations(SBAR). Information from the following report(s) Nurse Handoff Report, Index, ED Encounter Summary, ED SBAR, Intake/Output, MAR, Recent Results, and Cardiac Rhythm SR  was reviewed with the receiving nurse. [place] Assessment: No data recorded  Lines:       Opportunity for questions and clarification was provided.       Patient transported with:  Monitor and O2 @ 2lpm  Nursing staff  Cardiac monitor

## 2023-05-31 LAB
BACTERIA SPEC CULT: NORMAL
BACTERIA SPEC CULT: NORMAL
Lab: NORMAL
Lab: NORMAL

## 2023-08-03 ENCOUNTER — APPOINTMENT (OUTPATIENT)
Age: 78
DRG: 194 | End: 2023-08-03
Payer: COMMERCIAL

## 2023-08-03 ENCOUNTER — HOSPITAL ENCOUNTER (INPATIENT)
Age: 78
LOS: 7 days | Discharge: HOME | DRG: 194 | End: 2023-08-10
Attending: EMERGENCY MEDICINE | Admitting: INTERNAL MEDICINE
Payer: COMMERCIAL

## 2023-08-03 DIAGNOSIS — R06.02 SHORTNESS OF BREATH: ICD-10-CM

## 2023-08-03 DIAGNOSIS — R79.89 ELEVATED D-DIMER: ICD-10-CM

## 2023-08-03 DIAGNOSIS — R06.03 ACUTE RESPIRATORY DISTRESS: Primary | ICD-10-CM

## 2023-08-03 DIAGNOSIS — R09.02 HYPOXIA: ICD-10-CM

## 2023-08-03 DIAGNOSIS — N18.9 CHRONIC KIDNEY DISEASE, UNSPECIFIED CKD STAGE: ICD-10-CM

## 2023-08-03 LAB
ALBUMIN SERPL-MCNC: 2.4 G/DL (ref 3.4–5)
ALBUMIN/GLOB SERPL: 0.4 (ref 0.8–1.7)
ALP SERPL-CCNC: 128 U/L (ref 45–117)
ALT SERPL-CCNC: 14 U/L (ref 13–56)
ANION GAP SERPL CALC-SCNC: 9 MMOL/L (ref 3–18)
APTT PPP: 30.6 SEC (ref 23–36.4)
ARTERIAL PATENCY WRIST A: YES
ARTERIAL PATENCY WRIST A: YES
AST SERPL W P-5'-P-CCNC: 23 U/L (ref 10–38)
BASE EXCESS BLDA CALC-SCNC: 2.7 MMOL/L (ref 0–3)
BASE EXCESS BLDA CALC-SCNC: 5.1 MMOL/L (ref 0–3)
BASOPHILS # BLD: 0 K/UL (ref 0–0.1)
BASOPHILS NFR BLD: 0 % (ref 0–2)
BDY SITE: ABNORMAL
BDY SITE: ABNORMAL
BILIRUB SERPL-MCNC: 0.7 MG/DL (ref 0.2–1)
BNP SERPL-MCNC: 8041 PG/ML (ref 0–1800)
BREATHS.SPONTANEOUS ON VENT: 16
BUN SERPL-MCNC: 33 MG/DL (ref 7–18)
BUN/CREAT SERPL: 17 (ref 12–20)
CA-I BLD-MCNC: 8.5 MG/DL (ref 8.5–10.1)
CHLORIDE SERPL-SCNC: 102 MMOL/L (ref 100–111)
CO2 SERPL-SCNC: 29 MMOL/L (ref 21–32)
COHGB MFR BLD: 0.8 % (ref 1–2)
COHGB MFR BLD: 1 % (ref 1–2)
CREAT SERPL-MCNC: 1.96 MG/DL (ref 0.6–1.3)
D DIMER PPP FEU-MCNC: 11.38 UG/ML(FEU)
DIFFERENTIAL METHOD BLD: ABNORMAL
EOSINOPHIL # BLD: 0.1 K/UL (ref 0–0.4)
EOSINOPHIL NFR BLD: 1 % (ref 0–5)
ERYTHROCYTE [DISTWIDTH] IN BLOOD BY AUTOMATED COUNT: 16.4 % (ref 11.6–14.5)
FIO2 ON VENT: 30 %
FIO2 ON VENT: 95 %
GAS FLOW.O2 O2 DELIVERY SYS: 15 L/MIN
GAS FLOW.O2 SETTING OXYMISER: 8
GLOBULIN SER CALC-MCNC: 6.7 G/DL (ref 2–4)
GLUCOSE BLD STRIP.AUTO-MCNC: 194 MG/DL (ref 70–110)
GLUCOSE SERPL-MCNC: 243 MG/DL (ref 74–99)
HCO3 BLDA-SCNC: 32 MMOL/L (ref 22–26)
HCO3 BLDA-SCNC: 34 MMOL/L (ref 22–26)
HCT VFR BLD AUTO: 43.2 % (ref 35–45)
HGB BLD-MCNC: 12.8 G/DL (ref 12–16)
IMM GRANULOCYTES # BLD AUTO: 0.1 K/UL (ref 0–0.04)
IMM GRANULOCYTES NFR BLD AUTO: 1 % (ref 0–0.5)
IPAP/PIP: 16
LACTATE SERPL-SCNC: 3.2 MMOL/L (ref 0.4–2)
LACTATE SERPL-SCNC: 3.7 MMOL/L (ref 0.4–2)
LYMPHOCYTES # BLD: 1.7 K/UL (ref 0.9–3.6)
LYMPHOCYTES NFR BLD: 16 % (ref 21–52)
MCH RBC QN AUTO: 26.7 PG (ref 24–34)
MCHC RBC AUTO-ENTMCNC: 29.6 G/DL (ref 31–37)
MCV RBC AUTO: 90.2 FL (ref 78–100)
METHGB MFR BLD: 0.1 % (ref 0–1.4)
METHGB MFR BLD: 0.2 % (ref 0–1.4)
MONOCYTES # BLD: 0.6 K/UL (ref 0.05–1.2)
MONOCYTES NFR BLD: 6 % (ref 3–10)
NEUTS SEG # BLD: 8 K/UL (ref 1.8–8)
NEUTS SEG NFR BLD: 76 % (ref 40–73)
NRBC # BLD: 0 K/UL (ref 0–0.01)
NRBC BLD-RTO: 0 PER 100 WBC
OXYHGB MFR BLD: 91.6 % (ref 95–99)
OXYHGB MFR BLD: 95.6 % (ref 95–99)
PCO2 BLDA: 73 MMHG (ref 35–45)
PCO2 BLDA: 74 MMHG (ref 35–45)
PEEP RESPIRATORY: 8
PERFORMED BY:: ABNORMAL
PH BLDA: 7.26 (ref 7.35–7.45)
PH BLDA: 7.28 (ref 7.35–7.45)
PLATELET # BLD AUTO: 225 K/UL (ref 135–420)
PMV BLD AUTO: 10.7 FL (ref 9.2–11.8)
PO2 BLDA: 106 MMHG (ref 80–100)
PO2 BLDA: 74 MMHG (ref 80–100)
POTASSIUM SERPL-SCNC: 4.8 MMOL/L (ref 3.5–5.5)
PROCALCITONIN SERPL-MCNC: 0.08 NG/ML
PROT SERPL-MCNC: 9.1 G/DL (ref 6.4–8.2)
RBC # BLD AUTO: 4.79 M/UL (ref 4.2–5.3)
SAO2 % BLD: 93 % (ref 95–99)
SAO2 % BLD: 97 % (ref 95–99)
SAO2% DEVICE SAO2% SENSOR NAME: ABNORMAL
SAO2% DEVICE SAO2% SENSOR NAME: ABNORMAL
SERVICE CMNT-IMP: ABNORMAL
SODIUM SERPL-SCNC: 140 MMOL/L (ref 136–145)
SPECIMEN SITE: ABNORMAL
SPECIMEN SITE: ABNORMAL
THERAPEUTIC RANGE: NORMAL SEC (ref 82–109)
TROPONIN I SERPL HS-MCNC: 67 NG/L (ref 0–54)
TROPONIN I SERPL HS-MCNC: 88 NG/L (ref 0–54)
VENTILATION MODE VENT: ABNORMAL
WBC # BLD AUTO: 10.4 K/UL (ref 4.6–13.2)

## 2023-08-03 PROCEDURE — 94640 AIRWAY INHALATION TREATMENT: CPT

## 2023-08-03 PROCEDURE — 5A09557 ASSISTANCE WITH RESPIRATORY VENTILATION, GREATER THAN 96 CONSECUTIVE HOURS, CONTINUOUS POSITIVE AIRWAY PRESSURE: ICD-10-PCS | Performed by: INTERNAL MEDICINE

## 2023-08-03 PROCEDURE — 99285 EMERGENCY DEPT VISIT HI MDM: CPT

## 2023-08-03 PROCEDURE — 94664 DEMO&/EVAL PT USE INHALER: CPT

## 2023-08-03 PROCEDURE — 94761 N-INVAS EAR/PLS OXIMETRY MLT: CPT

## 2023-08-03 PROCEDURE — 85379 FIBRIN DEGRADATION QUANT: CPT

## 2023-08-03 PROCEDURE — 2700000000 HC OXYGEN THERAPY PER DAY

## 2023-08-03 PROCEDURE — 93005 ELECTROCARDIOGRAM TRACING: CPT | Performed by: EMERGENCY MEDICINE

## 2023-08-03 PROCEDURE — 80053 COMPREHEN METABOLIC PANEL: CPT

## 2023-08-03 PROCEDURE — 1100000000 HC RM PRIVATE

## 2023-08-03 PROCEDURE — 84484 ASSAY OF TROPONIN QUANT: CPT

## 2023-08-03 PROCEDURE — 2580000003 HC RX 258: Performed by: EMERGENCY MEDICINE

## 2023-08-03 PROCEDURE — 87040 BLOOD CULTURE FOR BACTERIA: CPT

## 2023-08-03 PROCEDURE — 96367 TX/PROPH/DG ADDL SEQ IV INF: CPT

## 2023-08-03 PROCEDURE — 82803 BLOOD GASES ANY COMBINATION: CPT

## 2023-08-03 PROCEDURE — 6360000002 HC RX W HCPCS: Performed by: EMERGENCY MEDICINE

## 2023-08-03 PROCEDURE — 85730 THROMBOPLASTIN TIME PARTIAL: CPT

## 2023-08-03 PROCEDURE — 96365 THER/PROPH/DIAG IV INF INIT: CPT

## 2023-08-03 PROCEDURE — 84145 PROCALCITONIN (PCT): CPT

## 2023-08-03 PROCEDURE — 36600 WITHDRAWAL OF ARTERIAL BLOOD: CPT

## 2023-08-03 PROCEDURE — 82962 GLUCOSE BLOOD TEST: CPT

## 2023-08-03 PROCEDURE — 85025 COMPLETE CBC W/AUTO DIFF WBC: CPT

## 2023-08-03 PROCEDURE — 83605 ASSAY OF LACTIC ACID: CPT

## 2023-08-03 PROCEDURE — 94660 CPAP INITIATION&MGMT: CPT

## 2023-08-03 PROCEDURE — 83880 ASSAY OF NATRIURETIC PEPTIDE: CPT

## 2023-08-03 PROCEDURE — 96375 TX/PRO/DX INJ NEW DRUG ADDON: CPT

## 2023-08-03 PROCEDURE — 71045 X-RAY EXAM CHEST 1 VIEW: CPT

## 2023-08-03 PROCEDURE — 6370000000 HC RX 637 (ALT 250 FOR IP): Performed by: NURSE PRACTITIONER

## 2023-08-03 PROCEDURE — 6360000002 HC RX W HCPCS: Performed by: NURSE PRACTITIONER

## 2023-08-03 PROCEDURE — 2000000000 HC ICU R&B

## 2023-08-03 RX ORDER — CARVEDILOL 6.25 MG/1
6.25 TABLET ORAL 2 TIMES DAILY WITH MEALS
Status: DISCONTINUED | OUTPATIENT
Start: 2023-08-04 | End: 2023-08-08

## 2023-08-03 RX ORDER — INSULIN LISPRO 100 [IU]/ML
0-4 INJECTION, SOLUTION INTRAVENOUS; SUBCUTANEOUS NIGHTLY
Status: DISCONTINUED | OUTPATIENT
Start: 2023-08-03 | End: 2023-08-10 | Stop reason: HOSPADM

## 2023-08-03 RX ORDER — SODIUM CHLORIDE, SODIUM LACTATE, POTASSIUM CHLORIDE, AND CALCIUM CHLORIDE .6; .31; .03; .02 G/100ML; G/100ML; G/100ML; G/100ML
30 INJECTION, SOLUTION INTRAVENOUS ONCE
Status: DISCONTINUED | OUTPATIENT
Start: 2023-08-03 | End: 2023-08-03

## 2023-08-03 RX ORDER — ATORVASTATIN CALCIUM 10 MG/1
20 TABLET, FILM COATED ORAL
Status: DISCONTINUED | OUTPATIENT
Start: 2023-08-03 | End: 2023-08-10 | Stop reason: HOSPADM

## 2023-08-03 RX ORDER — INSULIN LISPRO 100 [IU]/ML
0-4 INJECTION, SOLUTION INTRAVENOUS; SUBCUTANEOUS
Status: DISCONTINUED | OUTPATIENT
Start: 2023-08-04 | End: 2023-08-10 | Stop reason: HOSPADM

## 2023-08-03 RX ORDER — ALBUTEROL SULFATE 2.5 MG/3ML
5 SOLUTION RESPIRATORY (INHALATION)
Status: COMPLETED | OUTPATIENT
Start: 2023-08-03 | End: 2023-08-03

## 2023-08-03 RX ORDER — FUROSEMIDE 10 MG/ML
40 INJECTION INTRAMUSCULAR; INTRAVENOUS 2 TIMES DAILY
Status: DISCONTINUED | OUTPATIENT
Start: 2023-08-03 | End: 2023-08-08

## 2023-08-03 RX ORDER — HEPARIN SODIUM 10000 [USP'U]/100ML
5-30 INJECTION, SOLUTION INTRAVENOUS CONTINUOUS
Status: DISCONTINUED | OUTPATIENT
Start: 2023-08-03 | End: 2023-08-05

## 2023-08-03 RX ORDER — ASPIRIN 81 MG/1
81 TABLET, CHEWABLE ORAL DAILY
Status: DISCONTINUED | OUTPATIENT
Start: 2023-08-04 | End: 2023-08-10 | Stop reason: HOSPADM

## 2023-08-03 RX ORDER — ALBUTEROL SULFATE 2.5 MG/3ML
2.5 SOLUTION RESPIRATORY (INHALATION) EVERY 4 HOURS PRN
Status: DISCONTINUED | OUTPATIENT
Start: 2023-08-03 | End: 2023-08-10 | Stop reason: HOSPADM

## 2023-08-03 RX ORDER — SODIUM CHLORIDE, SODIUM LACTATE, POTASSIUM CHLORIDE, AND CALCIUM CHLORIDE .6; .31; .03; .02 G/100ML; G/100ML; G/100ML; G/100ML
1800 INJECTION, SOLUTION INTRAVENOUS ONCE
Status: COMPLETED | OUTPATIENT
Start: 2023-08-03 | End: 2023-08-03

## 2023-08-03 RX ORDER — ONDANSETRON 2 MG/ML
4 INJECTION INTRAMUSCULAR; INTRAVENOUS ONCE
Status: COMPLETED | OUTPATIENT
Start: 2023-08-03 | End: 2023-08-03

## 2023-08-03 RX ORDER — ACETAMINOPHEN 325 MG/1
650 TABLET ORAL EVERY 4 HOURS PRN
Status: DISCONTINUED | OUTPATIENT
Start: 2023-08-03 | End: 2023-08-10 | Stop reason: HOSPADM

## 2023-08-03 RX ORDER — HEPARIN SODIUM 1000 [USP'U]/ML
60 INJECTION, SOLUTION INTRAVENOUS; SUBCUTANEOUS ONCE
Status: COMPLETED | OUTPATIENT
Start: 2023-08-03 | End: 2023-08-03

## 2023-08-03 RX ORDER — IPRATROPIUM BROMIDE AND ALBUTEROL SULFATE 2.5; .5 MG/3ML; MG/3ML
1 SOLUTION RESPIRATORY (INHALATION)
Status: DISCONTINUED | OUTPATIENT
Start: 2023-08-04 | End: 2023-08-07

## 2023-08-03 RX ORDER — DEXTROSE MONOHYDRATE 100 MG/ML
INJECTION, SOLUTION INTRAVENOUS CONTINUOUS PRN
Status: DISCONTINUED | OUTPATIENT
Start: 2023-08-03 | End: 2023-08-10 | Stop reason: HOSPADM

## 2023-08-03 RX ADMIN — FUROSEMIDE 40 MG: 10 INJECTION, SOLUTION INTRAMUSCULAR; INTRAVENOUS at 21:46

## 2023-08-03 RX ADMIN — CEFTRIAXONE SODIUM 1000 MG: 1 INJECTION, POWDER, FOR SOLUTION INTRAMUSCULAR; INTRAVENOUS at 16:57

## 2023-08-03 RX ADMIN — HEPARIN SODIUM 8840 UNITS: 1000 INJECTION INTRAVENOUS; SUBCUTANEOUS at 20:03

## 2023-08-03 RX ADMIN — ONDANSETRON 4 MG: 2 INJECTION INTRAMUSCULAR; INTRAVENOUS at 17:57

## 2023-08-03 RX ADMIN — AZITHROMYCIN MONOHYDRATE 500 MG: 500 INJECTION, POWDER, LYOPHILIZED, FOR SOLUTION INTRAVENOUS at 17:39

## 2023-08-03 RX ADMIN — HEPARIN SODIUM 14 UNITS/KG/HR: 10000 INJECTION, SOLUTION INTRAVENOUS at 20:08

## 2023-08-03 RX ADMIN — ALBUTEROL SULFATE 5 MG: 2.5 SOLUTION RESPIRATORY (INHALATION) at 17:02

## 2023-08-03 RX ADMIN — SODIUM CHLORIDE, POTASSIUM CHLORIDE, SODIUM LACTATE AND CALCIUM CHLORIDE 1800 ML: 600; 310; 30; 20 INJECTION, SOLUTION INTRAVENOUS at 17:43

## 2023-08-03 RX ADMIN — ATORVASTATIN CALCIUM 20 MG: 10 TABLET, FILM COATED ORAL at 21:51

## 2023-08-03 ASSESSMENT — LIFESTYLE VARIABLES
HOW OFTEN DO YOU HAVE A DRINK CONTAINING ALCOHOL: NEVER
HOW MANY STANDARD DRINKS CONTAINING ALCOHOL DO YOU HAVE ON A TYPICAL DAY: PATIENT DOES NOT DRINK

## 2023-08-03 ASSESSMENT — PAIN - FUNCTIONAL ASSESSMENT: PAIN_FUNCTIONAL_ASSESSMENT: NONE - DENIES PAIN

## 2023-08-03 NOTE — ED NOTES
TRANSFER - OUT REPORT:    Verbal report given to [address] on [address]  being transferred to icu for routine progression of patient care       Report consisted of patient's Situation, Background, Assessment and   Recommendations(SBAR). Information from the following report(s) Nurse Handoff Report was reviewed with the receiving nurse. [place] Assessment:    Presents to emergency department  because of falls (Syncope, seizure, or loss of consciousness): No  Age > 70: Yes  Altered Mental Status, Intoxication with alcohol or substance confusion (Disorientation, impaired judgment, poor safety awaremess, or inability to follow instructions): Yes  Impaired Mobility: Ambulates or transfers with assistive devices or assistance; Unable to ambulate or transer.: Yes             Lines:   Peripheral IV 08/03/23 Left Antecubital (Active)   Site Assessment Clean, dry & intact 08/03/23 1651   Line Status Blood return noted 08/03/23 1651       Peripheral IV 08/03/23 Left Hand (Active)   Site Assessment Clean, dry & intact 08/03/23 1808   Line Status Blood return noted 08/03/23 1808        Opportunity for questions and clarification was provided.       Patient transported with:  Monitor and Registered Nurse          Lenice Sicard, LPN  49/08/55 [id number]

## 2023-08-03 NOTE — ED PROVIDER NOTES
[facility] EMERGENCY DEPT  EMERGENCY DEPARTMENT ENCOUNTER      Pt Name: Byron Wu  MRN: [id number]  [address] [facility] 1945  Date of evaluation: 8/3/2023  Provider: Brad Dobson MD    [address] [facility]       Chief Complaint   Patient presents with    Altered Mental Status         HISTORY OF PRESENT ILLNESS   (Location/Symptom, Timing/Onset, Context/Setting, Quality, Duration, Modifying Factors, Severity)  Note limiting factors. Byron Wu is a morbidly obese, wheelchair-bound 78 y.o. female with past medical history significant for diastolic congestive heart failure, diabetes type 2, hypertension, high cholesterol who presents to the emergency department for delayed evaluation of shortness of breath for the last 2 days. Gradual onset and unchanged in character or severity. Denies any systemic symptoms, positional association. No alleviating factors attempted. No true aggravating factors. EMS was called and found her to be hypoxic at 66% on room air, improved with 14 L of nonrebreather up to approximately 95%. The history is provided by the patient, the EMS personnel and medical records. Nursing Notes were reviewed. REVIEW OF SYSTEMS    (2-9 systems for level 4, 10 or more for level 5)     Review of Systems   All other systems reviewed and are negative. Except as noted above the remainder of the review of systems was reviewed and negative.        PAST MEDICAL HISTORY     Past Medical History:   Diagnosis Date    CHF (congestive heart failure) ([address])     Diabetes ([address])     Hyperlipemia     Sleep apnea          SURGICAL HISTORY       Past Surgical History:   Procedure Laterality Date    CATARACT REMOVAL Bilateral     TOTAL HIP ARTHROPLASTY Bilateral     TOTAL KNEE ARTHROPLASTY Bilateral          CURRENT MEDICATIONS       Previous Medications    ASPIRIN 81 MG CHEWABLE TABLET    Take 1 tablet by mouth daily    ATORVASTATIN (LIPITOR) 20 MG TABLET    Take 1 tablet by mouth nightly    CARVEDILOL (COREG) 12.5 MG

## 2023-08-04 ENCOUNTER — APPOINTMENT (OUTPATIENT)
Age: 78
DRG: 194 | End: 2023-08-04
Attending: INTERNAL MEDICINE
Payer: COMMERCIAL

## 2023-08-04 ENCOUNTER — APPOINTMENT (OUTPATIENT)
Age: 78
DRG: 194 | End: 2023-08-04
Payer: COMMERCIAL

## 2023-08-04 ENCOUNTER — HOSPITAL ENCOUNTER (INPATIENT)
Age: 78
Discharge: HOME | DRG: 194 | End: 2023-08-07
Payer: COMMERCIAL

## 2023-08-04 LAB
AMORPH CRY URNS QL MICRO: ABNORMAL
ANION GAP SERPL CALC-SCNC: 2 MMOL/L (ref 3–18)
ANION GAP SERPL CALC-SCNC: 7 MMOL/L (ref 3–18)
APPEARANCE UR: ABNORMAL
APTT PPP: 136.6 SEC (ref 23–36.4)
APTT PPP: 141.5 SEC (ref 23–36.4)
APTT PPP: >180 SEC (ref 23–36.4)
ARTERIAL PATENCY WRIST A: YES
BACTERIA URNS QL MICRO: ABNORMAL /HPF
BASE EXCESS BLDA CALC-SCNC: 3.4 MMOL/L (ref 0–3)
BASOPHILS # BLD: 0 K/UL (ref 0–0.1)
BASOPHILS NFR BLD: 0 % (ref 0–2)
BDY SITE: ABNORMAL
BILIRUB UR QL: NEGATIVE
BREATHS.SPONTANEOUS ON VENT: 24
BUN SERPL-MCNC: 36 MG/DL (ref 7–18)
BUN SERPL-MCNC: 37 MG/DL (ref 7–18)
BUN/CREAT SERPL: 17 (ref 12–20)
BUN/CREAT SERPL: 19 (ref 12–20)
CA-I BLD-MCNC: 7.9 MG/DL (ref 8.5–10.1)
CA-I BLD-MCNC: 8.2 MG/DL (ref 8.5–10.1)
CHLORIDE SERPL-SCNC: 100 MMOL/L (ref 100–111)
CHLORIDE SERPL-SCNC: 101 MMOL/L (ref 100–111)
CO2 SERPL-SCNC: 34 MMOL/L (ref 21–32)
CO2 SERPL-SCNC: 37 MMOL/L (ref 21–32)
COHGB MFR BLD: 0.9 % (ref 1–2)
COLOR UR: ABNORMAL
CREAT SERPL-MCNC: 1.86 MG/DL (ref 0.6–1.3)
CREAT SERPL-MCNC: 2.16 MG/DL (ref 0.6–1.3)
DIFFERENTIAL METHOD BLD: ABNORMAL
ECHO AO ASC DIAM: 4.3 CM
ECHO AO ASCENDING AORTA INDEX: 1.75 CM/M2
ECHO AO ROOT DIAM: 3.4 CM
ECHO AO ROOT INDEX: 1.38 CM/M2
ECHO AV AREA PEAK VELOCITY: 3.4 CM2
ECHO AV AREA VTI: 2.9 CM2
ECHO AV AREA/BSA PEAK VELOCITY: 1.4 CM2/M2
ECHO AV AREA/BSA VTI: 1.2 CM2/M2
ECHO AV MEAN GRADIENT: 4 MMHG
ECHO AV MEAN VELOCITY: 1 M/S
ECHO AV PEAK GRADIENT: 7 MMHG
ECHO AV PEAK VELOCITY: 1.3 M/S
ECHO AV VELOCITY RATIO: 0.85
ECHO AV VTI: 31.9 CM
ECHO BSA: 2.62 M2
ECHO BSA: 2.62 M2
ECHO EST RA PRESSURE: 8 MMHG
ECHO IVC PROX: 2.9 CM
ECHO LA AREA 2C: 28.9 CM2
ECHO LA AREA 4C: 35.7 CM2
ECHO LA DIAMETER INDEX: 1.46 CM/M2
ECHO LA DIAMETER: 3.6 CM
ECHO LA MAJOR AXIS: 7.1 CM
ECHO LA MINOR AXIS: 6.6 CM
ECHO LA TO AORTIC ROOT RATIO: 1.06
ECHO LA VOL 2C: 102 ML (ref 22–52)
ECHO LA VOL 4C: 143 ML (ref 22–52)
ECHO LA VOL BP: 124 ML (ref 22–52)
ECHO LA VOL/BSA BIPLANE: 50 ML/M2 (ref 16–34)
ECHO LA VOLUME INDEX A2C: 41 ML/M2 (ref 16–34)
ECHO LA VOLUME INDEX A4C: 58 ML/M2 (ref 16–34)
ECHO LV E' LATERAL VELOCITY: 6 CM/S
ECHO LV E' SEPTAL VELOCITY: 5 CM/S
ECHO LV FRACTIONAL SHORTENING: 33 % (ref 28–44)
ECHO LV INTERNAL DIMENSION DIASTOLE INDEX: 1.83 CM/M2
ECHO LV INTERNAL DIMENSION DIASTOLIC: 4.5 CM (ref 3.9–5.3)
ECHO LV INTERNAL DIMENSION SYSTOLIC INDEX: 1.22 CM/M2
ECHO LV INTERNAL DIMENSION SYSTOLIC: 3 CM
ECHO LV IVSD: 1.4 CM (ref 0.6–0.9)
ECHO LV MASS 2D: 248.4 G (ref 67–162)
ECHO LV MASS INDEX 2D: 101 G/M2 (ref 43–95)
ECHO LV POSTERIOR WALL DIASTOLIC: 1.4 CM (ref 0.6–0.9)
ECHO LV RELATIVE WALL THICKNESS RATIO: 0.62
ECHO LVOT AREA: 4.2 CM2
ECHO LVOT AV VTI INDEX: 0.7
ECHO LVOT DIAM: 2.3 CM
ECHO LVOT MEAN GRADIENT: 2 MMHG
ECHO LVOT PEAK GRADIENT: 4 MMHG
ECHO LVOT PEAK VELOCITY: 1.1 M/S
ECHO LVOT STROKE VOLUME INDEX: 37.6 ML/M2
ECHO LVOT SV: 92.6 ML
ECHO LVOT VTI: 22.3 CM
ECHO MV A VELOCITY: 0.86 M/S
ECHO MV AREA VTI: 4.8 CM2
ECHO MV E DECELERATION TIME (DT): 304 MS
ECHO MV E VELOCITY: 0.54 M/S
ECHO MV E/A RATIO: 0.63
ECHO MV E/E' LATERAL: 9
ECHO MV E/E' RATIO (AVERAGED): 9.9
ECHO MV E/E' SEPTAL: 10.8
ECHO MV LVOT VTI INDEX: 0.87
ECHO MV MAX VELOCITY: 0.9 M/S
ECHO MV MEAN GRADIENT: 1 MMHG
ECHO MV MEAN VELOCITY: 0.5 M/S
ECHO MV PEAK GRADIENT: 3 MMHG
ECHO MV VTI: 19.4 CM
ECHO PV MAX VELOCITY: 0.9 M/S
ECHO PV PEAK GRADIENT: 3 MMHG
ECHO RA AREA 4C: 26 CM2
ECHO RA END SYSTOLIC VOLUME APICAL 4 CHAMBER INDEX BSA: 35 ML/M2
ECHO RA VOLUME: 87 ML
ECHO RIGHT VENTRICULAR SYSTOLIC PRESSURE (RVSP): 67 MMHG
ECHO RV BASAL DIMENSION: 4.3 CM
ECHO RV GLOBAL SYSTOLIC STRAIN (GLS): -17.6 %
ECHO RV LONGITUDINAL DIMENSION: 7.9 CM
ECHO RV MID DIMENSION: 3.2 CM
ECHO RV TAPSE: 1.8 CM (ref 1.7–?)
ECHO TV REGURGITANT MAX VELOCITY: 3.84 M/S
ECHO TV REGURGITANT PEAK GRADIENT: 59 MMHG
EKG ATRIAL RATE: 98 BPM
EKG DIAGNOSIS: NORMAL
EKG P AXIS: 37 DEGREES
EKG P-R INTERVAL: 198 MS
EKG Q-T INTERVAL: 392 MS
EKG QRS DURATION: 86 MS
EKG QTC CALCULATION (BAZETT): 500 MS
EKG R AXIS: 47 DEGREES
EKG T AXIS: 61 DEGREES
EKG VENTRICULAR RATE: 98 BPM
EOSINOPHIL # BLD: 0.2 K/UL (ref 0–0.4)
EOSINOPHIL NFR BLD: 2 % (ref 0–5)
EPITH CASTS URNS QL MICRO: ABNORMAL /LPF (ref 0–20)
ERYTHROCYTE [DISTWIDTH] IN BLOOD BY AUTOMATED COUNT: 16.2 % (ref 11.6–14.5)
FIO2 ON VENT: 30 %
GAS FLOW.O2 SETTING OXYMISER: 18
GLUCOSE BLD STRIP.AUTO-MCNC: 112 MG/DL (ref 70–110)
GLUCOSE BLD STRIP.AUTO-MCNC: 130 MG/DL (ref 70–110)
GLUCOSE BLD STRIP.AUTO-MCNC: 165 MG/DL (ref 70–110)
GLUCOSE BLD STRIP.AUTO-MCNC: 252 MG/DL (ref 70–110)
GLUCOSE SERPL-MCNC: 141 MG/DL (ref 74–99)
GLUCOSE SERPL-MCNC: 211 MG/DL (ref 74–99)
GLUCOSE UR STRIP.AUTO-MCNC: NEGATIVE MG/DL
HCO3 BLDA-SCNC: 32 MMOL/L (ref 22–26)
HCT VFR BLD AUTO: 38.3 % (ref 35–45)
HGB BLD-MCNC: 11.7 G/DL (ref 12–16)
HGB UR QL STRIP: NEGATIVE
IMM GRANULOCYTES # BLD AUTO: 0 K/UL (ref 0–0.04)
IMM GRANULOCYTES NFR BLD AUTO: 0 % (ref 0–0.5)
KETONES UR QL STRIP.AUTO: NEGATIVE MG/DL
LACTATE SERPL-SCNC: 1.7 MMOL/L (ref 0.4–2)
LACTATE SERPL-SCNC: 2.6 MMOL/L (ref 0.4–2)
LEUKOCYTE ESTERASE UR QL STRIP.AUTO: NEGATIVE
LYMPHOCYTES # BLD: 1.4 K/UL (ref 0.9–3.6)
LYMPHOCYTES NFR BLD: 17 % (ref 21–52)
MAGNESIUM SERPL-MCNC: 2 MG/DL (ref 1.6–2.6)
MCH RBC QN AUTO: 27.5 PG (ref 24–34)
MCHC RBC AUTO-ENTMCNC: 30.5 G/DL (ref 31–37)
MCV RBC AUTO: 90.1 FL (ref 78–100)
METHGB MFR BLD: 0.2 % (ref 0–1.4)
MONOCYTES # BLD: 0.5 K/UL (ref 0.05–1.2)
MONOCYTES NFR BLD: 6 % (ref 3–10)
MUCOUS THREADS URNS QL MICRO: ABNORMAL /LPF
NEUTS SEG # BLD: 6.2 K/UL (ref 1.8–8)
NEUTS SEG NFR BLD: 75 % (ref 40–73)
NITRITE UR QL STRIP.AUTO: NEGATIVE
NRBC # BLD: 0 K/UL (ref 0–0.01)
NRBC BLD-RTO: 0 PER 100 WBC
OXYHGB MFR BLD: 94.5 % (ref 95–99)
PCO2 BLDA: 67 MMHG (ref 35–45)
PEEP RESPIRATORY: 12
PERFORMED BY:: ABNORMAL
PH BLDA: 7.29 (ref 7.35–7.45)
PH UR STRIP: 5 (ref 5–8)
PHOSPHATE SERPL-MCNC: 5 MG/DL (ref 2.5–4.9)
PLATELET # BLD AUTO: 167 K/UL (ref 135–420)
PMV BLD AUTO: 11.1 FL (ref 9.2–11.8)
PO2 BLDA: 87 MMHG (ref 80–100)
POTASSIUM SERPL-SCNC: 4.2 MMOL/L (ref 3.5–5.5)
POTASSIUM SERPL-SCNC: 4.3 MMOL/L (ref 3.5–5.5)
PROT UR STRIP-MCNC: 30 MG/DL
RBC # BLD AUTO: 4.25 M/UL (ref 4.2–5.3)
RBC #/AREA URNS HPF: ABNORMAL /HPF (ref 0–2)
SAO2 % BLD: 96 % (ref 95–99)
SAO2% DEVICE SAO2% SENSOR NAME: ABNORMAL
SARS-COV-2 RDRP RESP QL NAA+PROBE: NOT DETECTED
SODIUM SERPL-SCNC: 140 MMOL/L (ref 136–145)
SODIUM SERPL-SCNC: 141 MMOL/L (ref 136–145)
SP GR UR REFRACTOMETRY: 1.02 (ref 1–1.03)
SPECIMEN SITE: ABNORMAL
THERAPEUTIC RANGE: ABNORMAL SEC (ref 82–109)
TROPONIN I SERPL HS-MCNC: 113 NG/L (ref 0–54)
TROPONIN I SERPL HS-MCNC: 63 NG/L (ref 0–54)
TROPONIN I SERPL HS-MCNC: 79 NG/L (ref 0–54)
TROPONIN I SERPL HS-MCNC: 92 NG/L (ref 0–54)
URINE CULTURE IF INDICATED: ABNORMAL
UROBILINOGEN UR QL STRIP.AUTO: 1 EU/DL (ref 0.2–1)
VENTILATION MODE VENT: ABNORMAL
WBC # BLD AUTO: 8.2 K/UL (ref 4.6–13.2)
WBC URNS QL MICRO: ABNORMAL /HPF (ref 0–4)

## 2023-08-04 PROCEDURE — 36600 WITHDRAWAL OF ARTERIAL BLOOD: CPT

## 2023-08-04 PROCEDURE — 83605 ASSAY OF LACTIC ACID: CPT

## 2023-08-04 PROCEDURE — 2000000000 HC ICU R&B

## 2023-08-04 PROCEDURE — 83735 ASSAY OF MAGNESIUM: CPT

## 2023-08-04 PROCEDURE — 94660 CPAP INITIATION&MGMT: CPT

## 2023-08-04 PROCEDURE — 85025 COMPLETE CBC W/AUTO DIFF WBC: CPT

## 2023-08-04 PROCEDURE — 2700000000 HC OXYGEN THERAPY PER DAY

## 2023-08-04 PROCEDURE — 51701 INSERT BLADDER CATHETER: CPT

## 2023-08-04 PROCEDURE — 87635 SARS-COV-2 COVID-19 AMP PRB: CPT

## 2023-08-04 PROCEDURE — 6360000002 HC RX W HCPCS: Performed by: NURSE PRACTITIONER

## 2023-08-04 PROCEDURE — 81001 URINALYSIS AUTO W/SCOPE: CPT

## 2023-08-04 PROCEDURE — 82803 BLOOD GASES ANY COMBINATION: CPT

## 2023-08-04 PROCEDURE — 94761 N-INVAS EAR/PLS OXIMETRY MLT: CPT

## 2023-08-04 PROCEDURE — 3430000000 HC RX DIAGNOSTIC RADIOPHARMACEUTICAL: Performed by: NURSE PRACTITIONER

## 2023-08-04 PROCEDURE — 93356 MYOCRD STRAIN IMG SPCKL TRCK: CPT

## 2023-08-04 PROCEDURE — 51702 INSERT TEMP BLADDER CATH: CPT

## 2023-08-04 PROCEDURE — 84484 ASSAY OF TROPONIN QUANT: CPT

## 2023-08-04 PROCEDURE — 93970 EXTREMITY STUDY: CPT

## 2023-08-04 PROCEDURE — 94762 N-INVAS EAR/PLS OXIMTRY CONT: CPT

## 2023-08-04 PROCEDURE — 2580000003 HC RX 258: Performed by: NURSE PRACTITIONER

## 2023-08-04 PROCEDURE — 94640 AIRWAY INHALATION TREATMENT: CPT

## 2023-08-04 PROCEDURE — 80048 BASIC METABOLIC PNL TOTAL CA: CPT

## 2023-08-04 PROCEDURE — 6370000000 HC RX 637 (ALT 250 FOR IP): Performed by: NURSE PRACTITIONER

## 2023-08-04 PROCEDURE — A9540 TC99M MAA: HCPCS | Performed by: NURSE PRACTITIONER

## 2023-08-04 PROCEDURE — 1100000000 HC RM PRIVATE

## 2023-08-04 PROCEDURE — 85730 THROMBOPLASTIN TIME PARTIAL: CPT

## 2023-08-04 PROCEDURE — 51798 US URINE CAPACITY MEASURE: CPT

## 2023-08-04 PROCEDURE — 84100 ASSAY OF PHOSPHORUS: CPT

## 2023-08-04 PROCEDURE — 71250 CT THORAX DX C-: CPT

## 2023-08-04 PROCEDURE — 82962 GLUCOSE BLOOD TEST: CPT

## 2023-08-04 RX ORDER — BACLOFEN 10 MG/1
5 TABLET ORAL 2 TIMES DAILY
COMMUNITY

## 2023-08-04 RX ORDER — ALBUTEROL SULFATE 90 UG/1
2 AEROSOL, METERED RESPIRATORY (INHALATION) EVERY 6 HOURS PRN
COMMUNITY

## 2023-08-04 RX ORDER — TRIAMCINOLONE ACETONIDE 1 MG/G
CREAM TOPICAL 2 TIMES DAILY
COMMUNITY

## 2023-08-04 RX ADMIN — CEFTRIAXONE SODIUM 1000 MG: 1 INJECTION, POWDER, FOR SOLUTION INTRAMUSCULAR; INTRAVENOUS at 17:26

## 2023-08-04 RX ADMIN — IPRATROPIUM BROMIDE AND ALBUTEROL SULFATE 1 DOSE: .5; 3 SOLUTION RESPIRATORY (INHALATION) at 12:53

## 2023-08-04 RX ADMIN — HEPARIN SODIUM 11 UNITS/KG/HR: 10000 INJECTION, SOLUTION INTRAVENOUS at 11:35

## 2023-08-04 RX ADMIN — KIT FOR THE PREPARATION OF TECHNETIUM TC 99M ALBUMIN AGGREGATED 5.2 MILLICURIE: 2.5 INJECTION, POWDER, FOR SOLUTION INTRAVENOUS at 08:40

## 2023-08-04 RX ADMIN — IPRATROPIUM BROMIDE AND ALBUTEROL SULFATE 1 DOSE: .5; 3 SOLUTION RESPIRATORY (INHALATION) at 16:37

## 2023-08-04 RX ADMIN — ASPIRIN 81 MG 81 MG: 81 TABLET ORAL at 11:26

## 2023-08-04 RX ADMIN — CARVEDILOL 6.25 MG: 6.25 TABLET, FILM COATED ORAL at 11:26

## 2023-08-04 RX ADMIN — IPRATROPIUM BROMIDE AND ALBUTEROL SULFATE 1 DOSE: .5; 3 SOLUTION RESPIRATORY (INHALATION) at 07:45

## 2023-08-04 RX ADMIN — AZITHROMYCIN MONOHYDRATE 500 MG: 500 INJECTION, POWDER, LYOPHILIZED, FOR SOLUTION INTRAVENOUS at 17:18

## 2023-08-04 RX ADMIN — FUROSEMIDE 40 MG: 10 INJECTION, SOLUTION INTRAMUSCULAR; INTRAVENOUS at 17:20

## 2023-08-04 RX ADMIN — ATORVASTATIN CALCIUM 20 MG: 10 TABLET, FILM COATED ORAL at 21:40

## 2023-08-04 RX ADMIN — CARVEDILOL 6.25 MG: 6.25 TABLET, FILM COATED ORAL at 17:25

## 2023-08-04 RX ADMIN — FUROSEMIDE 40 MG: 10 INJECTION, SOLUTION INTRAMUSCULAR; INTRAVENOUS at 11:26

## 2023-08-04 RX ADMIN — IPRATROPIUM BROMIDE AND ALBUTEROL SULFATE 1 DOSE: .5; 3 SOLUTION RESPIRATORY (INHALATION) at 19:56

## 2023-08-04 NOTE — FLOWSHEET NOTE
08/04/23 0723   Handoff   Communication Given Shift Handoff   Handoff Given To COURTNEY Rey RN/IFEOMA Guevara RN   Handoff Communication Face to Face   Time Handoff Given 1885

## 2023-08-04 NOTE — H&P
History and Physical    Subjective:     Jammie Hermosillo is a 78 y.o. female with a past medical history significant for HTN, HLP, HFpEF (EF of 55%), DM-II, CKD stage III, KVNG (uses CPAP), morbid obesity, who presents to the ED today with complaints of progressively worsening shortness of breath in the last 2 days. History is obtained from the patient in the ICU. Patient states she has been having difficulty breathing in the last 2 days, it has been progressively worsening, and today 'I was out of it\". States shortness of breath is worsened on exertion. She is morbidly obese and uses CPAP nightly hence denies PND. States she saw her foot doctor today for a scheduled appointment and by the time she got back home, her breathing got worse and this prompted the ED visit. She denies any fever but admits to chills. Also complains of productive cough. She denies denies any nausea or vomiting. States today she had \" a little chest pain\". No extremity edema. No abdominal distention, no abdominal pain. No other complaints voiced. In the ED she was found hypoxic with O2 sats down to 67% on room air, chest x-ray shows slightly increased patchy lower lobe opacities. BNP 8041. ECG shows sinus rhythm/PVCs/prolonged QTc.  D-dimer was elevated at 11.8, creatinine is 1.96 at baseline, unable to use contrast.  She was placed on a nonrebreather mask, and blood gas came back showing pH of 7.25, PCO2 73, PO2 105, placed on BiPAP. Hospitalist was asked to admit. We agreed patient meets inpatient admission criteria with an estimated length of stay of 2-3 midnights. Discharge disposition: Home, with outpatient follow-ups, pending clinical course.     Past Medical History:   Diagnosis Date    CHF (congestive heart failure) ([address])     Diabetes ([address])     Hyperlipemia     Sleep apnea       Past Surgical History:   Procedure Laterality Date    CATARACT REMOVAL Bilateral     TOTAL HIP ARTHROPLASTY Bilateral     TOTAL KNEE

## 2023-08-05 LAB
ANION GAP SERPL CALC-SCNC: 2 MMOL/L (ref 3–18)
ANION GAP SERPL CALC-SCNC: 7 MMOL/L (ref 3–18)
APTT PPP: 38 SEC (ref 23–36.4)
BASOPHILS # BLD: 0 K/UL (ref 0–0.1)
BASOPHILS NFR BLD: 0 % (ref 0–2)
BUN SERPL-MCNC: 39 MG/DL (ref 7–18)
BUN SERPL-MCNC: 41 MG/DL (ref 7–18)
BUN/CREAT SERPL: 19 (ref 12–20)
BUN/CREAT SERPL: 21 (ref 12–20)
CA-I BLD-MCNC: 8.2 MG/DL (ref 8.5–10.1)
CA-I BLD-MCNC: 8.3 MG/DL (ref 8.5–10.1)
CHLORIDE SERPL-SCNC: 100 MMOL/L (ref 100–111)
CHLORIDE SERPL-SCNC: 99 MMOL/L (ref 100–111)
CO2 SERPL-SCNC: 34 MMOL/L (ref 21–32)
CO2 SERPL-SCNC: 36 MMOL/L (ref 21–32)
CREAT SERPL-MCNC: 1.88 MG/DL (ref 0.6–1.3)
CREAT SERPL-MCNC: 2.14 MG/DL (ref 0.6–1.3)
DIFFERENTIAL METHOD BLD: ABNORMAL
EOSINOPHIL # BLD: 0.3 K/UL (ref 0–0.4)
EOSINOPHIL NFR BLD: 3 % (ref 0–5)
ERYTHROCYTE [DISTWIDTH] IN BLOOD BY AUTOMATED COUNT: 16 % (ref 11.6–14.5)
GLUCOSE BLD STRIP.AUTO-MCNC: 124 MG/DL (ref 70–110)
GLUCOSE BLD STRIP.AUTO-MCNC: 124 MG/DL (ref 70–110)
GLUCOSE BLD STRIP.AUTO-MCNC: 208 MG/DL (ref 70–110)
GLUCOSE BLD STRIP.AUTO-MCNC: 282 MG/DL (ref 70–110)
GLUCOSE SERPL-MCNC: 160 MG/DL (ref 74–99)
GLUCOSE SERPL-MCNC: 217 MG/DL (ref 74–99)
HCT VFR BLD AUTO: 39.6 % (ref 35–45)
HGB BLD-MCNC: 11.3 G/DL (ref 12–16)
IMM GRANULOCYTES # BLD AUTO: 0 K/UL (ref 0–0.04)
IMM GRANULOCYTES NFR BLD AUTO: 0 % (ref 0–0.5)
LYMPHOCYTES # BLD: 0.9 K/UL (ref 0.9–3.6)
LYMPHOCYTES NFR BLD: 9 % (ref 21–52)
MAGNESIUM SERPL-MCNC: 2.1 MG/DL (ref 1.6–2.6)
MCH RBC QN AUTO: 26.5 PG (ref 24–34)
MCHC RBC AUTO-ENTMCNC: 28.5 G/DL (ref 31–37)
MCV RBC AUTO: 92.7 FL (ref 78–100)
MONOCYTES # BLD: 0.5 K/UL (ref 0.05–1.2)
MONOCYTES NFR BLD: 5 % (ref 3–10)
NEUTS SEG # BLD: 8.8 K/UL (ref 1.8–8)
NEUTS SEG NFR BLD: 83 % (ref 40–73)
NRBC # BLD: 0 K/UL (ref 0–0.01)
NRBC BLD-RTO: 0 PER 100 WBC
PERFORMED BY:: ABNORMAL
PHOSPHATE SERPL-MCNC: 4.7 MG/DL (ref 2.5–4.9)
PLATELET # BLD AUTO: 178 K/UL (ref 135–420)
PMV BLD AUTO: 10.5 FL (ref 9.2–11.8)
POTASSIUM SERPL-SCNC: 4.1 MMOL/L (ref 3.5–5.5)
POTASSIUM SERPL-SCNC: 4.4 MMOL/L (ref 3.5–5.5)
RBC # BLD AUTO: 4.27 M/UL (ref 4.2–5.3)
RBC MORPH BLD: ABNORMAL
SODIUM SERPL-SCNC: 136 MMOL/L (ref 136–145)
SODIUM SERPL-SCNC: 142 MMOL/L (ref 136–145)
THERAPEUTIC RANGE: ABNORMAL SEC (ref 82–109)
TROPONIN I SERPL HS-MCNC: 46 NG/L (ref 0–54)
TROPONIN I SERPL HS-MCNC: 48 NG/L (ref 0–54)
WBC # BLD AUTO: 10.5 K/UL (ref 4.6–13.2)

## 2023-08-05 PROCEDURE — 94660 CPAP INITIATION&MGMT: CPT

## 2023-08-05 PROCEDURE — 82962 GLUCOSE BLOOD TEST: CPT

## 2023-08-05 PROCEDURE — 84100 ASSAY OF PHOSPHORUS: CPT

## 2023-08-05 PROCEDURE — 94640 AIRWAY INHALATION TREATMENT: CPT

## 2023-08-05 PROCEDURE — 6370000000 HC RX 637 (ALT 250 FOR IP): Performed by: NURSE PRACTITIONER

## 2023-08-05 PROCEDURE — 2580000003 HC RX 258: Performed by: INTERNAL MEDICINE

## 2023-08-05 PROCEDURE — 2700000000 HC OXYGEN THERAPY PER DAY

## 2023-08-05 PROCEDURE — 6360000002 HC RX W HCPCS: Performed by: INTERNAL MEDICINE

## 2023-08-05 PROCEDURE — 2580000003 HC RX 258: Performed by: NURSE PRACTITIONER

## 2023-08-05 PROCEDURE — 80048 BASIC METABOLIC PNL TOTAL CA: CPT

## 2023-08-05 PROCEDURE — 36415 COLL VENOUS BLD VENIPUNCTURE: CPT

## 2023-08-05 PROCEDURE — 6360000002 HC RX W HCPCS: Performed by: NURSE PRACTITIONER

## 2023-08-05 PROCEDURE — 83735 ASSAY OF MAGNESIUM: CPT

## 2023-08-05 PROCEDURE — 85025 COMPLETE CBC W/AUTO DIFF WBC: CPT

## 2023-08-05 PROCEDURE — 1100000000 HC RM PRIVATE

## 2023-08-05 PROCEDURE — 6370000000 HC RX 637 (ALT 250 FOR IP): Performed by: INTERNAL MEDICINE

## 2023-08-05 PROCEDURE — 85730 THROMBOPLASTIN TIME PARTIAL: CPT

## 2023-08-05 PROCEDURE — 2000000000 HC ICU R&B

## 2023-08-05 PROCEDURE — 84484 ASSAY OF TROPONIN QUANT: CPT

## 2023-08-05 PROCEDURE — 94762 N-INVAS EAR/PLS OXIMTRY CONT: CPT

## 2023-08-05 RX ORDER — METHYLPREDNISOLONE SODIUM SUCCINATE 40 MG/ML
40 INJECTION, POWDER, LYOPHILIZED, FOR SOLUTION INTRAMUSCULAR; INTRAVENOUS EVERY 6 HOURS
Status: DISCONTINUED | OUTPATIENT
Start: 2023-08-05 | End: 2023-08-05

## 2023-08-05 RX ORDER — HEPARIN SODIUM 1000 [USP'U]/ML
10000 INJECTION, SOLUTION INTRAVENOUS; SUBCUTANEOUS ONCE
Status: COMPLETED | OUTPATIENT
Start: 2023-08-05 | End: 2023-08-05

## 2023-08-05 RX ADMIN — FUROSEMIDE 40 MG: 10 INJECTION, SOLUTION INTRAMUSCULAR; INTRAVENOUS at 17:13

## 2023-08-05 RX ADMIN — ASPIRIN 81 MG 81 MG: 81 TABLET ORAL at 08:08

## 2023-08-05 RX ADMIN — IPRATROPIUM BROMIDE AND ALBUTEROL SULFATE 1 DOSE: .5; 3 SOLUTION RESPIRATORY (INHALATION) at 19:57

## 2023-08-05 RX ADMIN — WATER 40 MG: 1 INJECTION INTRAMUSCULAR; INTRAVENOUS; SUBCUTANEOUS at 15:59

## 2023-08-05 RX ADMIN — FUROSEMIDE 40 MG: 10 INJECTION, SOLUTION INTRAMUSCULAR; INTRAVENOUS at 08:08

## 2023-08-05 RX ADMIN — IPRATROPIUM BROMIDE AND ALBUTEROL SULFATE 1 DOSE: .5; 3 SOLUTION RESPIRATORY (INHALATION) at 07:31

## 2023-08-05 RX ADMIN — CARVEDILOL 6.25 MG: 6.25 TABLET, FILM COATED ORAL at 08:08

## 2023-08-05 RX ADMIN — HEPARIN SODIUM 10000 UNITS: 1000 INJECTION INTRAVENOUS; SUBCUTANEOUS at 05:07

## 2023-08-05 RX ADMIN — INSULIN LISPRO 1 UNITS: 100 INJECTION, SOLUTION INTRAVENOUS; SUBCUTANEOUS at 16:15

## 2023-08-05 RX ADMIN — APIXABAN 10 MG: 5 TABLET, FILM COATED ORAL at 08:08

## 2023-08-05 RX ADMIN — CARVEDILOL 6.25 MG: 6.25 TABLET, FILM COATED ORAL at 17:13

## 2023-08-05 RX ADMIN — CEFTRIAXONE SODIUM 1000 MG: 1 INJECTION, POWDER, FOR SOLUTION INTRAMUSCULAR; INTRAVENOUS at 16:00

## 2023-08-05 RX ADMIN — IPRATROPIUM BROMIDE AND ALBUTEROL SULFATE 1 DOSE: .5; 3 SOLUTION RESPIRATORY (INHALATION) at 15:06

## 2023-08-05 RX ADMIN — ATORVASTATIN CALCIUM 20 MG: 10 TABLET, FILM COATED ORAL at 20:57

## 2023-08-05 RX ADMIN — WATER 40 MG: 1 INJECTION INTRAMUSCULAR; INTRAVENOUS; SUBCUTANEOUS at 21:58

## 2023-08-05 RX ADMIN — IPRATROPIUM BROMIDE AND ALBUTEROL SULFATE 1 DOSE: .5; 3 SOLUTION RESPIRATORY (INHALATION) at 11:12

## 2023-08-05 RX ADMIN — AZITHROMYCIN MONOHYDRATE 500 MG: 500 INJECTION, POWDER, LYOPHILIZED, FOR SOLUTION INTRAVENOUS at 15:58

## 2023-08-05 RX ADMIN — WATER 40 MG: 1 INJECTION INTRAMUSCULAR; INTRAVENOUS; SUBCUTANEOUS at 10:24

## 2023-08-05 RX ADMIN — APIXABAN 10 MG: 5 TABLET, FILM COATED ORAL at 20:57

## 2023-08-05 NOTE — CARE COORDINATION
Case Management Assessment  Initial Evaluation    Date/Time of Evaluation: 8/5/2023 1:48 PM  Assessment Completed by: Sarita Sanchez    If patient is discharged prior to next notation, then this note serves as note for discharge by case management. Patient Name: Markus Marrero                   YOB: 1945  Diagnosis: Shortness of breath [R06.02]  Acute respiratory distress [R06.03]  Hypoxia [R09.02]  Elevated d-dimer [R79.89]  Chronic kidney disease, unspecified CKD stage [N18.9]                   Date / Time: 8/3/2023  4:35 PM    Patient Admission Status: Inpatient   Readmission Risk (Low < 19, Mod (19-27), High > 27): Readmission Risk Score: 19.5    Current PCP: Harsha Cabrera MD  PCP verified by CM? Chart Reviewed: Yes      History Provided by: Patient  Patient Orientation: Alert and Oriented    Patient Cognition: Alert    Hospitalization in the last 30 days (Readmission):  No    If yes, Readmission Assessment in CM Navigator will be completed. Advance Directives:      Code Status: Full Code   Patient's Primary Decision Maker is: Legal Next of Kin      Discharge Planning:    Patient lives with: Children Type of Home: House  Primary Care Giver:    Patient Support Systems include: Children   Current Financial resources:    Current community resources:    Current services prior to admission: Durable Medical Equipment            Current DME: Walker, Cpap            Type of Home Care services:  [address]  Prior functional level:    Current functional level:      PT AM-PAC:   /24  OT AM-PAC:   /24    Family can provide assistance at DC: Would you like Case Management to discuss the discharge plan with any other family members/significant others, and if so, who?     Plans to Return to Present Housing:    Other Identified Issues/Barriers to RETURNING to current housing: yes  Potential Assistance needed at discharge: Durable Medical Equipment            Potential DME: Other

## 2023-08-06 LAB
ANION GAP SERPL CALC-SCNC: 3 MMOL/L (ref 3–18)
BASOPHILS # BLD: 0 K/UL (ref 0–0.1)
BASOPHILS NFR BLD: 0 % (ref 0–2)
BNP SERPL-MCNC: 3279 PG/ML (ref 0–1800)
BUN SERPL-MCNC: 39 MG/DL (ref 7–18)
BUN/CREAT SERPL: 23 (ref 12–20)
CA-I BLD-MCNC: 8.2 MG/DL (ref 8.5–10.1)
CHLORIDE SERPL-SCNC: 99 MMOL/L (ref 100–111)
CO2 SERPL-SCNC: 35 MMOL/L (ref 21–32)
CREAT SERPL-MCNC: 1.7 MG/DL (ref 0.6–1.3)
DIFFERENTIAL METHOD BLD: ABNORMAL
EOSINOPHIL # BLD: 0 K/UL (ref 0–0.4)
EOSINOPHIL NFR BLD: 0 % (ref 0–5)
ERYTHROCYTE [DISTWIDTH] IN BLOOD BY AUTOMATED COUNT: 15.6 % (ref 11.6–14.5)
GLUCOSE BLD STRIP.AUTO-MCNC: 229 MG/DL (ref 70–110)
GLUCOSE BLD STRIP.AUTO-MCNC: 257 MG/DL (ref 70–110)
GLUCOSE BLD STRIP.AUTO-MCNC: 260 MG/DL (ref 70–110)
GLUCOSE BLD STRIP.AUTO-MCNC: 296 MG/DL (ref 70–110)
GLUCOSE SERPL-MCNC: 259 MG/DL (ref 74–99)
HCT VFR BLD AUTO: 37.7 % (ref 35–45)
HGB BLD-MCNC: 11.3 G/DL (ref 12–16)
IMM GRANULOCYTES # BLD AUTO: 0.1 K/UL (ref 0–0.04)
IMM GRANULOCYTES NFR BLD AUTO: 1 % (ref 0–0.5)
LYMPHOCYTES # BLD: 0.6 K/UL (ref 0.9–3.6)
LYMPHOCYTES NFR BLD: 5 % (ref 21–52)
MAGNESIUM SERPL-MCNC: 2.1 MG/DL (ref 1.6–2.6)
MCH RBC QN AUTO: 27 PG (ref 24–34)
MCHC RBC AUTO-ENTMCNC: 30 G/DL (ref 31–37)
MCV RBC AUTO: 90 FL (ref 78–100)
MONOCYTES # BLD: 0.1 K/UL (ref 0.05–1.2)
MONOCYTES NFR BLD: 1 % (ref 3–10)
NEUTS SEG # BLD: 12.6 K/UL (ref 1.8–8)
NEUTS SEG NFR BLD: 93 % (ref 40–73)
NRBC # BLD: 0 K/UL (ref 0–0.01)
NRBC BLD-RTO: 0 PER 100 WBC
PERFORMED BY:: ABNORMAL
PHOSPHATE SERPL-MCNC: 3.1 MG/DL (ref 2.5–4.9)
PLATELET # BLD AUTO: 200 K/UL (ref 135–420)
PMV BLD AUTO: 10.8 FL (ref 9.2–11.8)
POTASSIUM SERPL-SCNC: 4.1 MMOL/L (ref 3.5–5.5)
PROCALCITONIN SERPL-MCNC: 0.13 NG/ML
RBC # BLD AUTO: 4.19 M/UL (ref 4.2–5.3)
SODIUM SERPL-SCNC: 137 MMOL/L (ref 136–145)
WBC # BLD AUTO: 13.3 K/UL (ref 4.6–13.2)

## 2023-08-06 PROCEDURE — 94762 N-INVAS EAR/PLS OXIMTRY CONT: CPT

## 2023-08-06 PROCEDURE — 2700000000 HC OXYGEN THERAPY PER DAY

## 2023-08-06 PROCEDURE — 6360000002 HC RX W HCPCS: Performed by: NURSE PRACTITIONER

## 2023-08-06 PROCEDURE — 84145 PROCALCITONIN (PCT): CPT

## 2023-08-06 PROCEDURE — 2580000003 HC RX 258: Performed by: NURSE PRACTITIONER

## 2023-08-06 PROCEDURE — 2000000000 HC ICU R&B

## 2023-08-06 PROCEDURE — 82962 GLUCOSE BLOOD TEST: CPT

## 2023-08-06 PROCEDURE — 6360000002 HC RX W HCPCS: Performed by: INTERNAL MEDICINE

## 2023-08-06 PROCEDURE — 83735 ASSAY OF MAGNESIUM: CPT

## 2023-08-06 PROCEDURE — 1100000000 HC RM PRIVATE

## 2023-08-06 PROCEDURE — 85025 COMPLETE CBC W/AUTO DIFF WBC: CPT

## 2023-08-06 PROCEDURE — 6370000000 HC RX 637 (ALT 250 FOR IP): Performed by: NURSE PRACTITIONER

## 2023-08-06 PROCEDURE — 36415 COLL VENOUS BLD VENIPUNCTURE: CPT

## 2023-08-06 PROCEDURE — 94640 AIRWAY INHALATION TREATMENT: CPT

## 2023-08-06 PROCEDURE — 6370000000 HC RX 637 (ALT 250 FOR IP): Performed by: INTERNAL MEDICINE

## 2023-08-06 PROCEDURE — 80048 BASIC METABOLIC PNL TOTAL CA: CPT

## 2023-08-06 PROCEDURE — 83880 ASSAY OF NATRIURETIC PEPTIDE: CPT

## 2023-08-06 PROCEDURE — 2580000003 HC RX 258: Performed by: INTERNAL MEDICINE

## 2023-08-06 PROCEDURE — 84100 ASSAY OF PHOSPHORUS: CPT

## 2023-08-06 RX ORDER — INSULIN GLARGINE 100 [IU]/ML
10 INJECTION, SOLUTION SUBCUTANEOUS DAILY
Status: DISCONTINUED | OUTPATIENT
Start: 2023-08-06 | End: 2023-08-09

## 2023-08-06 RX ADMIN — WATER 20 MG: 1 INJECTION INTRAMUSCULAR; INTRAVENOUS; SUBCUTANEOUS at 11:09

## 2023-08-06 RX ADMIN — APIXABAN 10 MG: 5 TABLET, FILM COATED ORAL at 08:38

## 2023-08-06 RX ADMIN — WATER 20 MG: 1 INJECTION INTRAMUSCULAR; INTRAVENOUS; SUBCUTANEOUS at 21:39

## 2023-08-06 RX ADMIN — INSULIN LISPRO 2 UNITS: 100 INJECTION, SOLUTION INTRAVENOUS; SUBCUTANEOUS at 16:11

## 2023-08-06 RX ADMIN — FUROSEMIDE 40 MG: 10 INJECTION, SOLUTION INTRAMUSCULAR; INTRAVENOUS at 08:38

## 2023-08-06 RX ADMIN — ATORVASTATIN CALCIUM 20 MG: 10 TABLET, FILM COATED ORAL at 21:09

## 2023-08-06 RX ADMIN — WATER 40 MG: 1 INJECTION INTRAMUSCULAR; INTRAVENOUS; SUBCUTANEOUS at 04:35

## 2023-08-06 RX ADMIN — INSULIN LISPRO 2 UNITS: 100 INJECTION, SOLUTION INTRAVENOUS; SUBCUTANEOUS at 11:09

## 2023-08-06 RX ADMIN — IPRATROPIUM BROMIDE AND ALBUTEROL SULFATE 1 DOSE: .5; 3 SOLUTION RESPIRATORY (INHALATION) at 11:49

## 2023-08-06 RX ADMIN — INSULIN LISPRO 1 UNITS: 100 INJECTION, SOLUTION INTRAVENOUS; SUBCUTANEOUS at 07:39

## 2023-08-06 RX ADMIN — FUROSEMIDE 40 MG: 10 INJECTION, SOLUTION INTRAMUSCULAR; INTRAVENOUS at 16:43

## 2023-08-06 RX ADMIN — CEFTRIAXONE SODIUM 1000 MG: 1 INJECTION, POWDER, FOR SOLUTION INTRAMUSCULAR; INTRAVENOUS at 16:12

## 2023-08-06 RX ADMIN — AZITHROMYCIN MONOHYDRATE 500 MG: 500 INJECTION, POWDER, LYOPHILIZED, FOR SOLUTION INTRAVENOUS at 16:43

## 2023-08-06 RX ADMIN — APIXABAN 10 MG: 5 TABLET, FILM COATED ORAL at 21:09

## 2023-08-06 RX ADMIN — ASPIRIN 81 MG 81 MG: 81 TABLET ORAL at 08:38

## 2023-08-06 RX ADMIN — IPRATROPIUM BROMIDE AND ALBUTEROL SULFATE 1 DOSE: .5; 3 SOLUTION RESPIRATORY (INHALATION) at 07:19

## 2023-08-06 RX ADMIN — INSULIN GLARGINE 10 UNITS: 100 INJECTION, SOLUTION SUBCUTANEOUS at 11:10

## 2023-08-06 RX ADMIN — IPRATROPIUM BROMIDE AND ALBUTEROL SULFATE 1 DOSE: .5; 3 SOLUTION RESPIRATORY (INHALATION) at 20:07

## 2023-08-06 RX ADMIN — IPRATROPIUM BROMIDE AND ALBUTEROL SULFATE 1 DOSE: .5; 3 SOLUTION RESPIRATORY (INHALATION) at 15:52

## 2023-08-06 RX ADMIN — WATER 20 MG: 1 INJECTION INTRAMUSCULAR; INTRAVENOUS; SUBCUTANEOUS at 16:11

## 2023-08-06 ASSESSMENT — PAIN SCALES - GENERAL
PAINLEVEL_OUTOF10: 0
PAINLEVEL_OUTOF10: 0

## 2023-08-07 LAB
ANION GAP SERPL CALC-SCNC: 4 MMOL/L (ref 3–18)
BASOPHILS # BLD: 0 K/UL (ref 0–0.1)
BASOPHILS NFR BLD: 0 % (ref 0–2)
BUN SERPL-MCNC: 44 MG/DL (ref 7–18)
BUN/CREAT SERPL: 27 (ref 12–20)
CA-I BLD-MCNC: 8.2 MG/DL (ref 8.5–10.1)
CHLORIDE SERPL-SCNC: 98 MMOL/L (ref 100–111)
CO2 SERPL-SCNC: 35 MMOL/L (ref 21–32)
CREAT SERPL-MCNC: 1.65 MG/DL (ref 0.6–1.3)
DIFFERENTIAL METHOD BLD: ABNORMAL
EOSINOPHIL # BLD: 0 K/UL (ref 0–0.4)
EOSINOPHIL NFR BLD: 0 % (ref 0–5)
ERYTHROCYTE [DISTWIDTH] IN BLOOD BY AUTOMATED COUNT: 15.5 % (ref 11.6–14.5)
GLUCOSE BLD STRIP.AUTO-MCNC: 258 MG/DL (ref 70–110)
GLUCOSE BLD STRIP.AUTO-MCNC: 278 MG/DL (ref 70–110)
GLUCOSE BLD STRIP.AUTO-MCNC: 301 MG/DL (ref 70–110)
GLUCOSE BLD STRIP.AUTO-MCNC: 320 MG/DL (ref 70–110)
GLUCOSE SERPL-MCNC: 263 MG/DL (ref 74–99)
HCT VFR BLD AUTO: 37.1 % (ref 35–45)
HGB BLD-MCNC: 11.2 G/DL (ref 12–16)
IMM GRANULOCYTES # BLD AUTO: 0.1 K/UL (ref 0–0.04)
IMM GRANULOCYTES NFR BLD AUTO: 1 % (ref 0–0.5)
LYMPHOCYTES # BLD: 0.8 K/UL (ref 0.9–3.6)
LYMPHOCYTES NFR BLD: 6 % (ref 21–52)
MAGNESIUM SERPL-MCNC: 2.3 MG/DL (ref 1.6–2.6)
MCH RBC QN AUTO: 27 PG (ref 24–34)
MCHC RBC AUTO-ENTMCNC: 30.2 G/DL (ref 31–37)
MCV RBC AUTO: 89.4 FL (ref 78–100)
MONOCYTES # BLD: 0.3 K/UL (ref 0.05–1.2)
MONOCYTES NFR BLD: 2 % (ref 3–10)
NEUTS SEG # BLD: 12.5 K/UL (ref 1.8–8)
NEUTS SEG NFR BLD: 91 % (ref 40–73)
NRBC # BLD: 0 K/UL (ref 0–0.01)
NRBC BLD-RTO: 0 PER 100 WBC
PERFORMED BY:: ABNORMAL
PHOSPHATE SERPL-MCNC: 3.1 MG/DL (ref 2.5–4.9)
PLATELET # BLD AUTO: 226 K/UL (ref 135–420)
PMV BLD AUTO: 10.4 FL (ref 9.2–11.8)
POTASSIUM SERPL-SCNC: 4.4 MMOL/L (ref 3.5–5.5)
RBC # BLD AUTO: 4.15 M/UL (ref 4.2–5.3)
SODIUM SERPL-SCNC: 137 MMOL/L (ref 136–145)
WBC # BLD AUTO: 13.7 K/UL (ref 4.6–13.2)

## 2023-08-07 PROCEDURE — 2000000000 HC ICU R&B

## 2023-08-07 PROCEDURE — 6370000000 HC RX 637 (ALT 250 FOR IP): Performed by: NURSE PRACTITIONER

## 2023-08-07 PROCEDURE — 2700000000 HC OXYGEN THERAPY PER DAY

## 2023-08-07 PROCEDURE — 94640 AIRWAY INHALATION TREATMENT: CPT

## 2023-08-07 PROCEDURE — 97530 THERAPEUTIC ACTIVITIES: CPT

## 2023-08-07 PROCEDURE — 83735 ASSAY OF MAGNESIUM: CPT

## 2023-08-07 PROCEDURE — 6360000002 HC RX W HCPCS: Performed by: INTERNAL MEDICINE

## 2023-08-07 PROCEDURE — 82962 GLUCOSE BLOOD TEST: CPT

## 2023-08-07 PROCEDURE — 97161 PT EVAL LOW COMPLEX 20 MIN: CPT

## 2023-08-07 PROCEDURE — 6370000000 HC RX 637 (ALT 250 FOR IP): Performed by: INTERNAL MEDICINE

## 2023-08-07 PROCEDURE — 36415 COLL VENOUS BLD VENIPUNCTURE: CPT

## 2023-08-07 PROCEDURE — 6360000002 HC RX W HCPCS: Performed by: NURSE PRACTITIONER

## 2023-08-07 PROCEDURE — 85025 COMPLETE CBC W/AUTO DIFF WBC: CPT

## 2023-08-07 PROCEDURE — 94762 N-INVAS EAR/PLS OXIMTRY CONT: CPT

## 2023-08-07 PROCEDURE — 84100 ASSAY OF PHOSPHORUS: CPT

## 2023-08-07 PROCEDURE — 2580000003 HC RX 258: Performed by: INTERNAL MEDICINE

## 2023-08-07 PROCEDURE — 80048 BASIC METABOLIC PNL TOTAL CA: CPT

## 2023-08-07 PROCEDURE — 1100000000 HC RM PRIVATE

## 2023-08-07 PROCEDURE — 94664 DEMO&/EVAL PT USE INHALER: CPT

## 2023-08-07 RX ORDER — IPRATROPIUM BROMIDE AND ALBUTEROL SULFATE 2.5; .5 MG/3ML; MG/3ML
1 SOLUTION RESPIRATORY (INHALATION) EVERY 4 HOURS PRN
Status: DISCONTINUED | OUTPATIENT
Start: 2023-08-07 | End: 2023-08-10 | Stop reason: HOSPADM

## 2023-08-07 RX ADMIN — INSULIN LISPRO 3 UNITS: 100 INJECTION, SOLUTION INTRAVENOUS; SUBCUTANEOUS at 08:16

## 2023-08-07 RX ADMIN — ASPIRIN 81 MG 81 MG: 81 TABLET ORAL at 08:15

## 2023-08-07 RX ADMIN — CEFTRIAXONE SODIUM 1000 MG: 1 INJECTION, POWDER, FOR SOLUTION INTRAMUSCULAR; INTRAVENOUS at 15:51

## 2023-08-07 RX ADMIN — IPRATROPIUM BROMIDE AND ALBUTEROL SULFATE 1 DOSE: .5; 3 SOLUTION RESPIRATORY (INHALATION) at 19:04

## 2023-08-07 RX ADMIN — INSULIN LISPRO 2 UNITS: 100 INJECTION, SOLUTION INTRAVENOUS; SUBCUTANEOUS at 11:13

## 2023-08-07 RX ADMIN — CARVEDILOL 6.25 MG: 6.25 TABLET, FILM COATED ORAL at 08:15

## 2023-08-07 RX ADMIN — IPRATROPIUM BROMIDE AND ALBUTEROL SULFATE 1 DOSE: .5; 3 SOLUTION RESPIRATORY (INHALATION) at 07:20

## 2023-08-07 RX ADMIN — APIXABAN 10 MG: 5 TABLET, FILM COATED ORAL at 21:18

## 2023-08-07 RX ADMIN — WATER 20 MG: 1 INJECTION INTRAMUSCULAR; INTRAVENOUS; SUBCUTANEOUS at 11:13

## 2023-08-07 RX ADMIN — ATORVASTATIN CALCIUM 20 MG: 10 TABLET, FILM COATED ORAL at 21:18

## 2023-08-07 RX ADMIN — AZITHROMYCIN MONOHYDRATE 500 MG: 500 INJECTION, POWDER, LYOPHILIZED, FOR SOLUTION INTRAVENOUS at 15:51

## 2023-08-07 RX ADMIN — WATER 20 MG: 1 INJECTION INTRAMUSCULAR; INTRAVENOUS; SUBCUTANEOUS at 15:51

## 2023-08-07 RX ADMIN — INSULIN LISPRO 3 UNITS: 100 INJECTION, SOLUTION INTRAVENOUS; SUBCUTANEOUS at 16:00

## 2023-08-07 RX ADMIN — FUROSEMIDE 40 MG: 10 INJECTION, SOLUTION INTRAMUSCULAR; INTRAVENOUS at 08:15

## 2023-08-07 RX ADMIN — WATER 20 MG: 1 INJECTION INTRAMUSCULAR; INTRAVENOUS; SUBCUTANEOUS at 04:44

## 2023-08-07 RX ADMIN — CARVEDILOL 6.25 MG: 6.25 TABLET, FILM COATED ORAL at 17:23

## 2023-08-07 RX ADMIN — IPRATROPIUM BROMIDE AND ALBUTEROL SULFATE 1 DOSE: .5; 3 SOLUTION RESPIRATORY (INHALATION) at 15:52

## 2023-08-07 RX ADMIN — WATER 20 MG: 1 INJECTION INTRAMUSCULAR; INTRAVENOUS; SUBCUTANEOUS at 21:18

## 2023-08-07 RX ADMIN — FUROSEMIDE 40 MG: 10 INJECTION, SOLUTION INTRAMUSCULAR; INTRAVENOUS at 17:24

## 2023-08-07 RX ADMIN — APIXABAN 10 MG: 5 TABLET, FILM COATED ORAL at 08:15

## 2023-08-07 RX ADMIN — IPRATROPIUM BROMIDE AND ALBUTEROL SULFATE 1 DOSE: .5; 3 SOLUTION RESPIRATORY (INHALATION) at 12:50

## 2023-08-07 RX ADMIN — INSULIN GLARGINE 10 UNITS: 100 INJECTION, SOLUTION SUBCUTANEOUS at 08:15

## 2023-08-07 ASSESSMENT — PAIN SCALES - GENERAL
PAINLEVEL_OUTOF10: 0

## 2023-08-07 NOTE — THERAPY EVALUATION
PHYSICAL THERAPY EVALUATION    Patient: Jami Mack (8 y.o. female)  Date: 8/7/2023  Physical Therapy Time:   PT Individual Minutes  Time In: 9568  Time Out: 5118  Minutes: 33  Timed Minute Breakdown:  20 eval 13 T. A. Primary Diagnosis: Shortness of breath [R06.02]  Acute respiratory distress [R06.03]  Hypoxia [R09.02]  Elevated d-dimer [R79.89]  Chronic kidney disease, unspecified CKD stage [N18.9] Shortness of breath  Present on Admission:   Shortness of breath        Precautions:   Restrictions/Precautions  Restrictions/Precautions: Other (comment) (Recent healing of R heel ulcer)      Assessment: Pt admitted for respiratory failure with findings of a P.E. She is currently on 4L via [place]. She is able ambulate with a RW with only mild dyspnea reported. She returns to bed and is positioned for comfort. Performance Deficits/Impairments: Decreased functional mobility ; Decreased ADL status; Decreased ROM; Decreased strength;Decreased endurance  Treatment Diagnosis: difficulty in walking  Therapy Prognosis: Good  Decision Making: Low Complexity  Discharge Recommendations: Home with Home health PT    Conditions Requiring skilled therapeutic intervention:  Performance Deficits/Impairments: Decreased functional mobility ; Decreased ADL status; Decreased ROM; Decreased strength;Decreased endurance     Evaluation Activity Tolerance:   Activity Tolerance  Activity Tolerance: Patient limited by fatigue    Equipment Recommendations for Discharge:  PT Equipment Recommendations  Equipment Needed: No    AM-PAC  AM-PAC Inpatient Mobility Raw Score : 18; Current research shows that an AM-PAC score of 17 or less is typically not associated with a discharge to the patient's home setting, whereas a score of 18 or greater is typically associated with a discharge to the patient's home setting.     Factors which may influence rehabilitation potential include:  Medical condition/comorbidities      PLAN :  [facility]

## 2023-08-08 LAB
GLUCOSE BLD STRIP.AUTO-MCNC: 199 MG/DL (ref 70–110)
GLUCOSE BLD STRIP.AUTO-MCNC: 214 MG/DL (ref 70–110)
GLUCOSE BLD STRIP.AUTO-MCNC: 277 MG/DL (ref 70–110)
GLUCOSE BLD STRIP.AUTO-MCNC: 318 MG/DL (ref 70–110)
PERFORMED BY:: ABNORMAL

## 2023-08-08 PROCEDURE — 2700000000 HC OXYGEN THERAPY PER DAY

## 2023-08-08 PROCEDURE — 97116 GAIT TRAINING THERAPY: CPT

## 2023-08-08 PROCEDURE — 82962 GLUCOSE BLOOD TEST: CPT

## 2023-08-08 PROCEDURE — 1100000000 HC RM PRIVATE

## 2023-08-08 PROCEDURE — 94761 N-INVAS EAR/PLS OXIMETRY MLT: CPT

## 2023-08-08 PROCEDURE — 6370000000 HC RX 637 (ALT 250 FOR IP): Performed by: NURSE PRACTITIONER

## 2023-08-08 PROCEDURE — 6360000002 HC RX W HCPCS: Performed by: INTERNAL MEDICINE

## 2023-08-08 PROCEDURE — 97530 THERAPEUTIC ACTIVITIES: CPT

## 2023-08-08 PROCEDURE — 2580000003 HC RX 258: Performed by: INTERNAL MEDICINE

## 2023-08-08 PROCEDURE — 6360000002 HC RX W HCPCS: Performed by: NURSE PRACTITIONER

## 2023-08-08 PROCEDURE — 6370000000 HC RX 637 (ALT 250 FOR IP)

## 2023-08-08 PROCEDURE — 2000000000 HC ICU R&B

## 2023-08-08 PROCEDURE — 6370000000 HC RX 637 (ALT 250 FOR IP): Performed by: INTERNAL MEDICINE

## 2023-08-08 RX ORDER — CARVEDILOL 3.12 MG/1
3.12 TABLET ORAL 2 TIMES DAILY WITH MEALS
Status: DISCONTINUED | OUTPATIENT
Start: 2023-08-08 | End: 2023-08-10 | Stop reason: HOSPADM

## 2023-08-08 RX ORDER — FUROSEMIDE 40 MG/1
40 TABLET ORAL DAILY
Status: DISCONTINUED | OUTPATIENT
Start: 2023-08-08 | End: 2023-08-08

## 2023-08-08 RX ORDER — FUROSEMIDE 40 MG/1
40 TABLET ORAL DAILY
Status: DISCONTINUED | OUTPATIENT
Start: 2023-08-09 | End: 2023-08-10 | Stop reason: HOSPADM

## 2023-08-08 RX ADMIN — INSULIN LISPRO 1 UNITS: 100 INJECTION, SOLUTION INTRAVENOUS; SUBCUTANEOUS at 07:57

## 2023-08-08 RX ADMIN — INSULIN GLARGINE 10 UNITS: 100 INJECTION, SOLUTION SUBCUTANEOUS at 07:58

## 2023-08-08 RX ADMIN — APIXABAN 10 MG: 5 TABLET, FILM COATED ORAL at 07:58

## 2023-08-08 RX ADMIN — INSULIN LISPRO 2 UNITS: 100 INJECTION, SOLUTION INTRAVENOUS; SUBCUTANEOUS at 15:59

## 2023-08-08 RX ADMIN — WATER 20 MG: 1 INJECTION INTRAMUSCULAR; INTRAVENOUS; SUBCUTANEOUS at 04:34

## 2023-08-08 RX ADMIN — CARVEDILOL 3.12 MG: 3.12 TABLET, FILM COATED ORAL at 15:58

## 2023-08-08 RX ADMIN — APIXABAN 10 MG: 5 TABLET, FILM COATED ORAL at 21:24

## 2023-08-08 RX ADMIN — ATORVASTATIN CALCIUM 20 MG: 10 TABLET, FILM COATED ORAL at 21:24

## 2023-08-08 RX ADMIN — CARVEDILOL 6.25 MG: 6.25 TABLET, FILM COATED ORAL at 07:58

## 2023-08-08 RX ADMIN — WATER 20 MG: 1 INJECTION INTRAMUSCULAR; INTRAVENOUS; SUBCUTANEOUS at 10:23

## 2023-08-08 RX ADMIN — CEFTRIAXONE SODIUM 1000 MG: 1 INJECTION, POWDER, FOR SOLUTION INTRAMUSCULAR; INTRAVENOUS at 15:33

## 2023-08-08 RX ADMIN — WATER 20 MG: 1 INJECTION INTRAMUSCULAR; INTRAVENOUS; SUBCUTANEOUS at 21:29

## 2023-08-08 RX ADMIN — WATER 20 MG: 1 INJECTION INTRAMUSCULAR; INTRAVENOUS; SUBCUTANEOUS at 15:30

## 2023-08-08 RX ADMIN — INSULIN LISPRO 4 UNITS: 100 INJECTION, SOLUTION INTRAVENOUS; SUBCUTANEOUS at 21:27

## 2023-08-08 RX ADMIN — ASPIRIN 81 MG 81 MG: 81 TABLET ORAL at 07:58

## 2023-08-08 RX ADMIN — AZITHROMYCIN MONOHYDRATE 500 MG: 500 INJECTION, POWDER, LYOPHILIZED, FOR SOLUTION INTRAVENOUS at 16:00

## 2023-08-08 RX ADMIN — FUROSEMIDE 40 MG: 10 INJECTION, SOLUTION INTRAMUSCULAR; INTRAVENOUS at 07:57

## 2023-08-08 ASSESSMENT — PAIN SCALES - GENERAL
PAINLEVEL_OUTOF10: 0

## 2023-08-08 NOTE — FLOWSHEET NOTE
08/08/23 1410   Vitals   Orthostatic B/P and Pulse? Yes   Blood Pressure Lying 149/86   Pulse Lying 54 PER MINUTE   Blood Pressure Sitting 158/81   Pulse Sitting 76 PER MINUTE   Blood Pressure Standing 186/94   Pulse Standing 100 PER MINUTE   Level of Consciousness 0   Oxygen Therapy   SpO2 (!) 88 %  (88% without Oxygen - 95% with 1 L of O2 via NC)     1410- ambulated client with PT - tolerated well - however, when attempted to ambulate without O2 - client dropped between 85-88%, no dizziness, no CP - just SOB with exertion. Set up into recliner chair - legs elevated - 1 L O2 via NC - no distress, call bell in reach. Recommend RT walking trail.

## 2023-08-09 LAB
ANION GAP SERPL CALC-SCNC: 5 MMOL/L (ref 3–18)
BACTERIA SPEC CULT: NORMAL
BACTERIA SPEC CULT: NORMAL
BUN SERPL-MCNC: 52 MG/DL (ref 7–18)
BUN/CREAT SERPL: 34 (ref 12–20)
CA-I BLD-MCNC: 8.3 MG/DL (ref 8.5–10.1)
CHLORIDE SERPL-SCNC: 94 MMOL/L (ref 100–111)
CO2 SERPL-SCNC: 37 MMOL/L (ref 21–32)
CREAT SERPL-MCNC: 1.53 MG/DL (ref 0.6–1.3)
GLUCOSE BLD STRIP.AUTO-MCNC: 277 MG/DL (ref 70–110)
GLUCOSE BLD STRIP.AUTO-MCNC: 281 MG/DL (ref 70–110)
GLUCOSE BLD STRIP.AUTO-MCNC: 317 MG/DL (ref 70–110)
GLUCOSE BLD STRIP.AUTO-MCNC: 333 MG/DL (ref 70–110)
GLUCOSE SERPL-MCNC: 350 MG/DL (ref 74–99)
Lab: NORMAL
Lab: NORMAL
MAGNESIUM SERPL-MCNC: 2.5 MG/DL (ref 1.6–2.6)
PERFORMED BY:: ABNORMAL
POTASSIUM SERPL-SCNC: 4.6 MMOL/L (ref 3.5–5.5)
SODIUM SERPL-SCNC: 136 MMOL/L (ref 136–145)

## 2023-08-09 PROCEDURE — 83735 ASSAY OF MAGNESIUM: CPT

## 2023-08-09 PROCEDURE — 82962 GLUCOSE BLOOD TEST: CPT

## 2023-08-09 PROCEDURE — 97116 GAIT TRAINING THERAPY: CPT

## 2023-08-09 PROCEDURE — 6370000000 HC RX 637 (ALT 250 FOR IP): Performed by: INTERNAL MEDICINE

## 2023-08-09 PROCEDURE — 2700000000 HC OXYGEN THERAPY PER DAY

## 2023-08-09 PROCEDURE — 1100000000 HC RM PRIVATE

## 2023-08-09 PROCEDURE — 6370000000 HC RX 637 (ALT 250 FOR IP): Performed by: NURSE PRACTITIONER

## 2023-08-09 PROCEDURE — 6360000002 HC RX W HCPCS: Performed by: INTERNAL MEDICINE

## 2023-08-09 PROCEDURE — 2580000003 HC RX 258: Performed by: INTERNAL MEDICINE

## 2023-08-09 PROCEDURE — 6370000000 HC RX 637 (ALT 250 FOR IP)

## 2023-08-09 PROCEDURE — 2000000000 HC ICU R&B

## 2023-08-09 PROCEDURE — 94761 N-INVAS EAR/PLS OXIMETRY MLT: CPT

## 2023-08-09 PROCEDURE — 80048 BASIC METABOLIC PNL TOTAL CA: CPT

## 2023-08-09 PROCEDURE — 94660 CPAP INITIATION&MGMT: CPT

## 2023-08-09 PROCEDURE — 94618 PULMONARY STRESS TESTING: CPT

## 2023-08-09 RX ORDER — INSULIN GLARGINE 100 [IU]/ML
15 INJECTION, SOLUTION SUBCUTANEOUS DAILY
Status: DISCONTINUED | OUTPATIENT
Start: 2023-08-10 | End: 2023-08-10 | Stop reason: HOSPADM

## 2023-08-09 RX ADMIN — INSULIN LISPRO 4 UNITS: 100 INJECTION, SOLUTION INTRAVENOUS; SUBCUTANEOUS at 21:08

## 2023-08-09 RX ADMIN — INSULIN LISPRO 3 UNITS: 100 INJECTION, SOLUTION INTRAVENOUS; SUBCUTANEOUS at 16:47

## 2023-08-09 RX ADMIN — CARVEDILOL 3.12 MG: 3.12 TABLET, FILM COATED ORAL at 08:03

## 2023-08-09 RX ADMIN — FUROSEMIDE 40 MG: 40 TABLET ORAL at 08:02

## 2023-08-09 RX ADMIN — CEFTRIAXONE SODIUM 1000 MG: 1 INJECTION, POWDER, FOR SOLUTION INTRAMUSCULAR; INTRAVENOUS at 16:16

## 2023-08-09 RX ADMIN — AZITHROMYCIN MONOHYDRATE 500 MG: 500 INJECTION, POWDER, LYOPHILIZED, FOR SOLUTION INTRAVENOUS at 16:44

## 2023-08-09 RX ADMIN — INSULIN GLARGINE 10 UNITS: 100 INJECTION, SOLUTION SUBCUTANEOUS at 07:25

## 2023-08-09 RX ADMIN — APIXABAN 10 MG: 5 TABLET, FILM COATED ORAL at 21:08

## 2023-08-09 RX ADMIN — INSULIN LISPRO 2 UNITS: 100 INJECTION, SOLUTION INTRAVENOUS; SUBCUTANEOUS at 07:25

## 2023-08-09 RX ADMIN — WATER 20 MG: 1 INJECTION INTRAMUSCULAR; INTRAVENOUS; SUBCUTANEOUS at 10:47

## 2023-08-09 RX ADMIN — CARVEDILOL 3.12 MG: 3.12 TABLET, FILM COATED ORAL at 16:48

## 2023-08-09 RX ADMIN — ASPIRIN 81 MG 81 MG: 81 TABLET ORAL at 08:02

## 2023-08-09 RX ADMIN — WATER 20 MG: 1 INJECTION INTRAMUSCULAR; INTRAVENOUS; SUBCUTANEOUS at 04:38

## 2023-08-09 RX ADMIN — APIXABAN 10 MG: 5 TABLET, FILM COATED ORAL at 08:02

## 2023-08-09 RX ADMIN — INSULIN LISPRO 2 UNITS: 100 INJECTION, SOLUTION INTRAVENOUS; SUBCUTANEOUS at 11:42

## 2023-08-09 RX ADMIN — ATORVASTATIN CALCIUM 20 MG: 10 TABLET, FILM COATED ORAL at 21:08

## 2023-08-09 ASSESSMENT — PAIN SCALES - GENERAL
PAINLEVEL_OUTOF10: 0

## 2023-08-09 NOTE — FLOWSHEET NOTE
08/09/23 0701   Handoff   Communication Given Shift Handoff   Handoff Given To JUAN Bains, RN   Handoff Communication Face to Face   Time Handoff Given 6365

## 2023-08-10 VITALS
RESPIRATION RATE: 15 BRPM | TEMPERATURE: 97.2 F | HEART RATE: 61 BPM | HEIGHT: 66 IN | DIASTOLIC BLOOD PRESSURE: 63 MMHG | OXYGEN SATURATION: 99 % | BODY MASS INDEX: 47.09 KG/M2 | WEIGHT: 293 LBS | SYSTOLIC BLOOD PRESSURE: 130 MMHG

## 2023-08-10 LAB
GLUCOSE BLD STRIP.AUTO-MCNC: 172 MG/DL (ref 70–110)
GLUCOSE BLD STRIP.AUTO-MCNC: 186 MG/DL (ref 70–110)
PERFORMED BY:: ABNORMAL
PERFORMED BY:: ABNORMAL

## 2023-08-10 PROCEDURE — 6370000000 HC RX 637 (ALT 250 FOR IP)

## 2023-08-10 PROCEDURE — 6370000000 HC RX 637 (ALT 250 FOR IP): Performed by: INTERNAL MEDICINE

## 2023-08-10 PROCEDURE — 2700000000 HC OXYGEN THERAPY PER DAY

## 2023-08-10 PROCEDURE — 94761 N-INVAS EAR/PLS OXIMETRY MLT: CPT

## 2023-08-10 PROCEDURE — 6370000000 HC RX 637 (ALT 250 FOR IP): Performed by: NURSE PRACTITIONER

## 2023-08-10 PROCEDURE — 82962 GLUCOSE BLOOD TEST: CPT

## 2023-08-10 RX ADMIN — INSULIN GLARGINE 15 UNITS: 100 INJECTION, SOLUTION SUBCUTANEOUS at 08:08

## 2023-08-10 RX ADMIN — FUROSEMIDE 40 MG: 40 TABLET ORAL at 08:09

## 2023-08-10 RX ADMIN — CARVEDILOL 3.12 MG: 3.12 TABLET, FILM COATED ORAL at 08:09

## 2023-08-10 RX ADMIN — APIXABAN 10 MG: 5 TABLET, FILM COATED ORAL at 08:09

## 2023-08-10 RX ADMIN — ASPIRIN 81 MG 81 MG: 81 TABLET ORAL at 08:09

## 2023-08-10 ASSESSMENT — PAIN SCALES - GENERAL
PAINLEVEL_OUTOF10: 0

## 2023-08-10 NOTE — PLAN OF CARE
Comprehensive Nutrition Assessment    Type and Reason for Visit:  Initial    Nutrition Recommendations/Plan:   Continue current diet order     Malnutrition Assessment:  Malnutrition Status:  No malnutrition (08/05/23 1723)    Context:  Chronic Illness     Findings of the 6 clinical characteristics of malnutrition:  Energy Intake:  No significant decrease in energy intake  Weight Loss:  Greater than 7.5% over 3 months     Body Fat Loss:  No significant body fat loss     Muscle Mass Loss:  No significant muscle mass loss    Fluid Accumulation:  Unable to assess     Strength:  Not Performed    Nutrition Assessment:    Patient is a 79 yo woman with pmhx sig for diastolic heart failure with preserved ef 50-55%, DM type II, HTN, CKD, HLD. Appetite and intake of current diabetic, cardiac, soft and bite sized diet is reportedly adequate. No c/o N/V/D, although patient shows ~11% wt loss x3 months, which is clinically sig although  patient not at risk for malnutrition. Patient may do well with some more controlled weight loss, although not appropriate at this time. Nutrition Related Findings:    BG stable, slightly elevated ~200 avg Wound Type: None       Current Nutrition Intake & Therapies:    Average Meal Intake: 51-75%, %  Average Supplements Intake: None Ordered  ADULT DIET; Dysphagia - Soft and Bite Sized; 3 carb choices (45 gm/meal); Low Fat/Low Chol/High Fiber/JOAQUINA    Anthropometric Measures:  Height: 5' 6\" (167.6 cm)  Ideal Body Weight (IBW): 130 lbs (59 kg)    Admission Body Weight: 325 lb (147.4 kg)  Current Body Weight: 325 lb (147.4 kg), 250 % IBW.  Weight Source: Bed Scale  Current BMI (kg/m2): 52.5        Weight Adjustment For: No Adjustment                 BMI Categories: Obese Class 3 (BMI 40.0 or greater)    Estimated Daily Nutrient Needs:  Energy Requirements Based On: Kcal/kg  Weight Used for Energy Requirements: Adjusted  Energy (kcal/day): 1900-2375kcal/d  Weight Used for Protein
Problem: Discharge Planning  Goal: Discharge to home or other facility with appropriate resources  8/10/2023 0838 by Belinda Zheng RN  Outcome: Adequate for Discharge  8/10/2023 0741 by Nadiya Langley RN  Outcome: Progressing  Flowsheets  Taken 8/10/2023 0730 by Nadiya Langley RN  Discharge to home or other facility with appropriate resources: Identify barriers to discharge with patient and caregiver  Taken 8/9/2023 1934 by Ari Lozada RN  Discharge to home or other facility with appropriate resources: Identify barriers to discharge with patient and caregiver     Problem: Chronic Conditions and Co-morbidities  Goal: Patient's chronic conditions and co-morbidity symptoms are monitored and maintained or improved  8/10/2023 0838 by Belinda Zheng RN  Outcome: Adequate for Discharge  8/10/2023 0741 by Nadiya Langley RN  Outcome: Progressing  Flowsheets  Taken 8/10/2023 0730 by Nadiya Langley RN  Care Plan - Patient's Chronic Conditions and Co-Morbidity Symptoms are Monitored and Maintained or Improved: Monitor and assess patient's chronic conditions and comorbid symptoms for stability, deterioration, or improvement  Taken 8/9/2023 1934 by Ari Lozada [facility] - Patient's Chronic Conditions and Co-Morbidity Symptoms are Monitored and Maintained or Improved: Monitor and assess patient's chronic conditions and comorbid symptoms for stability, deterioration, or improvement     Problem: Safety - Adult  Goal: Free from fall injury  8/10/2023 0838 by Belinda Zheng RN  Outcome: Adequate for Discharge  8/10/2023 0741 by Nadiya Langley RN  Outcome: Progressing     Problem: Respiratory - Adult  Goal: Achieves optimal ventilation and oxygenation  8/10/2023 0838 by Belinda Zheng RN  Outcome: Adequate for Discharge  8/10/2023 0741 by Nadiya Langley RN  Outcome: Progressing  Flowsheets  Taken 8/10/2023 0730 by Nadiya Langley RN  Achieves optimal ventilation and oxygenation:
Problem: Discharge Planning  Goal: Discharge to home or other facility with appropriate resources  Outcome: Progressing  Flowsheets  Taken 8/10/2023 0730 by Davin Jeffrey RN  Discharge to home or other facility with appropriate resources: Identify barriers to discharge with patient and caregiver  Taken 8/9/2023 1934 by Low Morel RN  Discharge to home or other facility with appropriate resources: Identify barriers to discharge with patient and caregiver     Problem: Chronic Conditions and Co-morbidities  Goal: Patient's chronic conditions and co-morbidity symptoms are monitored and maintained or improved  Outcome: Progressing  Flowsheets  Taken 8/10/2023 0730 by Davin Jeffrey [facility] - Patient's Chronic Conditions and Co-Morbidity Symptoms are Monitored and Maintained or Improved: Monitor and assess patient's chronic conditions and comorbid symptoms for stability, deterioration, or improvement  Taken 8/9/2023 1934 by Low Morel [facility] - Patient's Chronic Conditions and Co-Morbidity Symptoms are Monitored and Maintained or Improved: Monitor and assess patient's chronic conditions and comorbid symptoms for stability, deterioration, or improvement     Problem: Safety - Adult  Goal: Free from fall injury  Outcome: Progressing     Problem: Respiratory - Adult  Goal: Achieves optimal ventilation and oxygenation  Outcome: Progressing  Flowsheets  Taken 8/10/2023 0730 by Davin Jeffrey RN  Achieves optimal ventilation and oxygenation: Assess for changes in respiratory status  Taken 8/9/2023 1934 by Low Morel RN  Achieves optimal ventilation and oxygenation: Assess for changes in respiratory status     Problem: Cardiovascular - Adult  Goal: Maintains optimal cardiac output and hemodynamic stability  Outcome: Progressing  Flowsheets  Taken 8/10/2023 0730 by Davin Jeffrey RN  Maintains optimal cardiac output and hemodynamic stability: Monitor blood pressure and heart rate  Taken 8/9/2023
Problem: Discharge Planning  Goal: Discharge to home or other facility with appropriate resources  Outcome: Progressing  Flowsheets (Taken 8/5/2023 0701)  Discharge to home or other facility with appropriate resources: Identify barriers to discharge with patient and caregiver     Problem: Chronic Conditions and Co-morbidities  Goal: Patient's chronic conditions and co-morbidity symptoms are monitored and maintained or improved  Outcome: Progressing  Flowsheets (Taken 8/5/2023 0701)  Care Plan - Patient's Chronic Conditions and Co-Morbidity Symptoms are Monitored and Maintained or Improved: Monitor and assess patient's chronic conditions and comorbid symptoms for stability, deterioration, or improvement     Problem: Safety - Adult  Goal: Free from fall injury  Outcome: Progressing     Problem: Respiratory - Adult  Goal: Achieves optimal ventilation and oxygenation  Outcome: Progressing  Flowsheets (Taken 8/5/2023 0701)  Achieves optimal ventilation and oxygenation: Assess for changes in respiratory status     Problem: Cardiovascular - Adult  Goal: Maintains optimal cardiac output and hemodynamic stability  Outcome: Progressing  Goal: Absence of cardiac dysrhythmias or at baseline  Outcome: Progressing     Problem: Skin/Tissue Integrity - Adult  Goal: Skin integrity remains intact  Outcome: Progressing  Flowsheets (Taken 8/5/2023 0701)  Skin Integrity Remains Intact: Monitor for areas of redness and/or skin breakdown  Goal: Incisions, wounds, or drain sites healing without S/S of infection  Recent Flowsheet Documentation  Taken 8/5/2023 0701 by Betty Oglesby RN  Incisions, Wounds, or Drain Sites Healing Without Sign and Symptoms of Infection: ADMISSION and DAILY: Assess and document risk factors for pressure ulcer development     Problem: Musculoskeletal - Adult  Goal: Return mobility to safest level of function  Outcome: Progressing  Goal: Maintain proper alignment of affected body part  Outcome:
Problem: Discharge Planning  Goal: Discharge to home or other facility with appropriate resources  Outcome: Progressing  Flowsheets (Taken 8/7/2023 1923 by José Luis Hernandez RN)  Discharge to home or other facility with appropriate resources: Identify barriers to discharge with patient and caregiver     Problem: Chronic Conditions and Co-morbidities  Goal: Patient's chronic conditions and co-morbidity symptoms are monitored and maintained or improved  Outcome: Progressing  Flowsheets (Taken 8/7/2023 1923 by José Luis Hernandez RN)  Care Plan - Patient's Chronic Conditions and Co-Morbidity Symptoms are Monitored and Maintained or Improved: Monitor and assess patient's chronic conditions and comorbid symptoms for stability, deterioration, or improvement     Problem: Safety - Adult  Goal: Free from fall injury  Outcome: Progressing     Problem: Respiratory - Adult  Goal: Achieves optimal ventilation and oxygenation  Outcome: Progressing  Flowsheets (Taken 8/7/2023 1923 by José Luis Hernandez RN)  Achieves optimal ventilation and oxygenation: Assess for changes in respiratory status     Problem: Cardiovascular - Adult  Goal: Maintains optimal cardiac output and hemodynamic stability  Outcome: Progressing  Flowsheets (Taken 8/7/2023 1923 by José Luis Hernandez RN)  Maintains optimal cardiac output and hemodynamic stability: Monitor blood pressure and heart rate  Goal: Absence of cardiac dysrhythmias or at baseline  Outcome: Progressing     Problem: Skin/Tissue Integrity - Adult  Goal: Skin integrity remains intact  Outcome: Progressing  Flowsheets (Taken 8/7/2023 1923 by José Luis Hernandez RN)  Skin Integrity Remains Intact: Monitor for areas of redness and/or skin breakdown  Goal: Incisions, wounds, or drain sites healing without S/S of infection  Outcome: Progressing     Problem: Musculoskeletal - Adult  Goal: Return mobility to safest level of function  Outcome: Progressing  Goal: Maintain proper alignment of affected body part  Outcome:
Problem: Discharge Planning  Goal: Discharge to home or other facility with appropriate resources  Recent Flowsheet Documentation  Taken 8/4/2023 1915 by Cameron Santiago RN  Discharge to home or other facility with appropriate resources: Identify discharge learning needs (meds, wound care, etc)     Problem: Chronic Conditions and Co-morbidities  Goal: Patient's chronic conditions and co-morbidity symptoms are monitored and maintained or improved  Recent Flowsheet Documentation  Taken 8/4/2023 1915 by Cameron Santiago RN  Care Plan - Patient's Chronic Conditions and Co-Morbidity Symptoms are Monitored and Maintained or Improved: Monitor and assess patient's chronic conditions and comorbid symptoms for stability, deterioration, or improvement
Skin/Tissue Integrity  Goal: Absence of new skin breakdown  Description: 1. Monitor for areas of redness and/or skin breakdown  2. Assess vascular access sites hourly  3. Every 4-6 hours minimum:  Change oxygen saturation probe site  4. Every 4-6 hours:  If on nasal continuous positive airway pressure, respiratory therapy assess nares and determine need for appliance change or resting period.   Outcome: Progressing     Problem: Nutrition Deficit:  Goal: Optimize nutritional status  Outcome: Progressing     Problem: Chronic Conditions and Co-morbidities  Goal: Patient's chronic conditions and co-morbidity symptoms are monitored and maintained or improved  Outcome: Not Progressing  Flowsheets (Taken 8/8/2023 2000)  Care Plan - Patient's Chronic Conditions and Co-Morbidity Symptoms are Monitored and Maintained or Improved: Monitor and assess patient's chronic conditions and comorbid symptoms for stability, deterioration, or improvement     Problem: Respiratory - Adult  Goal: Achieves optimal ventilation and oxygenation  Outcome: Not Progressing     Problem: Cardiovascular - Adult  Goal: Maintains optimal cardiac output and hemodynamic stability  Outcome: Not Progressing     Problem: Musculoskeletal - Adult  Goal: Return mobility to safest level of function  Outcome: Not Progressing  Goal: Maintain proper alignment of affected body part  Outcome: Not Progressing  Goal: Return ADL status to a safe level of function  Outcome: Not Progressing
barriers to discharge with patient and caregiver     Problem: Discharge Planning  Goal: Discharge to home or other facility with appropriate resources  Recent Flowsheet Documentation  Taken 8/3/2023 2217 by Benja Albarran RN  Discharge to home or other facility with appropriate resources: Identify barriers to discharge with patient and caregiver     Problem: Chronic Conditions and Co-morbidities  Goal: Patient's chronic conditions and co-morbidity symptoms are monitored and maintained or improved  Outcome: Not Progressing  Flowsheets (Taken 8/3/2023 1946)  Care Plan - Patient's Chronic Conditions and Co-Morbidity Symptoms are Monitored and Maintained or Improved: Monitor and assess patient's chronic conditions and comorbid symptoms for stability, deterioration, or improvement     Problem: Cardiovascular - Adult  Goal: Maintains optimal cardiac output and hemodynamic stability  Outcome: Not Progressing     Problem: Musculoskeletal - Adult  Goal: Return mobility to safest level of function  Outcome: Not Progressing  Goal: Maintain proper alignment of affected body part  Outcome: Not Progressing  Goal: Return ADL status to a safe level of function  Outcome: Not Progressing

## 2023-08-10 NOTE — PROGRESS NOTES
conducted an initial consultation and Spiritual Assessment for Tommie Cole, who is a 78 y.o.,female. Patient's Primary Language is: [place]. According to the patient's EMR Holiness Affiliation is: Filiberto. The reason the Patient came to the hospital is:   Patient Active Problem List    Diagnosis Date Noted    Shortness of breath 08/03/2023    Acute respiratory failure ([address]) 05/25/2023    Acute on chronic diastolic heart failure ([address]) 05/25/2023    Hyperlipidemia 05/25/2023    Diabetes mellitus type 2 in [facility]) 05/25/2023    Acute cystitis 05/25/2023        The  provided the following Interventions:  Initiated a relationship of care and support. Explored issues of nila, belief, spirituality and Taoist/ritual needs while hospitalized. Listened empathically. Provided chaplaincy education concerning Advance Medical Directive. Provided information about Spiritual Care Services. Offered prayer and assurance of continued prayers on patient's behalf. Chart reviewed. The following outcomes where achieved:  Patient shared limited information about both their medical narrative and spiritual journey/beliefs.  confirmed Patient's Holiness Affiliation. Patient processed feeling about current hospitalization. Patient expressed gratitude for 's visit. Assessment:  Patient does not have any Taoist/cultural needs that will affect patient's preferences in health care. There are no spiritual or Taoist issues which require intervention at this time. Plan:  Chaplains will continue to follow and will provide pastoral care on an as needed/requested basis.  recommends bedside caregivers page  on duty if patient shows signs of acute spiritual or emotional distress.     [address]  [phone]
-1900 Received care of pt from off going nurse.    -1920 O. Oba, NP into see pt.    -1949 Pt is awake in bed. A&OX4. Lung sounds diminished in all lung fields. NRB @ 15 lpn 545%. SATS 98%. Skin warm and dry. Dry, flaky, scaly, cracked areas to bilateral heels. +2 pitting edema to BLE. Pt denies any complaints at present. Pt orient to call bell system and  bed controls. RT tech at pt bedside.      -2000 Pt family members at pt bedside.    -2003 8,840  Heparin Units  IV bolus given.    -2008 Heparin drip started @ 14 units/kg/hr. RT tech putting pt on BIPAP. BIPAP settings I-16, E-8, FIO2 @ 30 %, RT-8.    -2137 . No sliding coverage given. Pt took po pm meds without any difficulty.    -0000 Pt down via bed to CT scan per Nursing supervisor, RT tech, and CT tech. -5090 Pt back from CT scan.    -0305 Pt has not voided. Bladder scan done 105 ml residual noted. Nupur Bartholomew, GABRIELA notified.    -0330 APTT result >180. Per protocol heparin drop held for 60 minutes. -4989 Straight cath done per NP orders. 150 ml frederick colored urine returned. Urine specimen collected. -0430 Heparin drip restarted @ 11 units/kg/hr.   Next APTT scheduled for 1030 this am.
-7759 Received care of pt from off going nurse. Pt family members at pt bedside.    -2000 PM Assessment completed. A&OX4. Resp even and non-labored. Lung sounds diminished in right upper lobes and bases. SATS 94% on 1 lpm via n/c.  HR 45-50's. Skin warm and dry. Area to right groin area dry and intact. Trace edema to BLE. Mead patent, draining yellow colored urine.     -2124 , 4 units given per sliding scale coverage. Pt took po pm meds without any difficulty. Pt given bedtime snack. CBWR, bed in lowest position. -1212 RT tech in to put pt on own CPAP.    -0002 Pt SATS 85-87%. RT tech beeped. RT tech in. Pt HR low 40's.     -0040 Pt SATS remain in low 80's. RT tech beeped. RT tech putting pt on BIPAP. BIPAP settings I-22, E-12, FIO2 @ 28%, RT-18. SATS %. CBWR, bed in lowest position.    -0230 Pt resting quietly in bed with eyes closed. Pt tolerating BIPAP without any difficulty. SATS %.    -0425 Pt easily arousal at present. Scheduled IV med  given. Pt assisted with repositioning self in bed. NAD noted. -9111 Pt awake and asking to  be taken off BIPAP. Pt taken off BIPAP, 1 lpm via n/c applied. SATS 97%. HOB @ 45 degrees.
0700- Bedside shift report received: Patient alert and oriented x , iv in place left arm - intact, , telemetry on running SB 40's - no acute distress, 1 L o2 via NC, call bell in reach, bed alarm on    Patient assessment performed    0809- Morning Medications given at this time time; education provided. 46- LAMB catheter removed - external purevick applied - educated client and explained to call when feeling urge to void, discharge orders received. 7836- provider rounding's completed - will discharge today - order received, O2 home delivered to house per CM. 1030- client voided 300 ml clear yellow straw urine spontan. 1047- vitals taken and - no distress. 1130- O2 tank was delivered, set up for lunch, no distress, will be picked up by family between 9969-1718. I/O this shift:  In: 1081.7 [P.O.:800; IV Piggyback:281.7]  Out: 7575 [BDLEK:0250]    2071- taken down by wheelchair with O2 and home tank, picked up by two sons.
0700- assumed care and received report, heparin drip was verified running at 10 - iv side intact left arm, extremities elveated on pillows - obesity, feet and heels very dry and cracked - elevated,  BIPAP on 50% FI02. Mead catheter in place 12 F- and draining yellow straw urine, brief clean, Call bell in reach and bed alarm on    0701- vitals taken and , assessment done, lung sounds diminished/coarse crackles right. HOB elevated. 8580- RT changed client from BIPAP to heated high flow cannular 50L/min - FIO2 60% - O2 Sat maintaining above 90% - client tolerating well. HOB elevated. 7456- heparin drip turned off per order - will start on eliquis- explained to patient. 3395- Med's given. 5829-  drawing troponin recheck. 0820- Trop negative 48.    1024- Solu Medrol 40 mg iv given - explained new med order and side effects to client, no distress, tolerating new O2 settings with high flow NC well - FI02 60%, 50 L/min, R 13- O2 Sat's 95%, call bell in reach. 1107- vitals taken and , no distress, reassessment done, oral diet cuba given, tolerated well, call bell in reach. 1114- RT adjusted FiO2 again down to 50 %, O2 flow rate 40% - R 13 O2 Sat 94% - tolerating well so far, no distress, SR on monitor. 1145- set up in bed for lunch - ordered a easy/soft/bite size cardiac/diabetic diet - tolerating well - assisted with set up- explained client importance to eat slow with breaks in between to prevent aspiration. 1302- Complete bed bath given, lotion applied to very dry/flaky skin - skin moisturizer applied to both feet and heels - elveated on pillows, repositioned to left side, no distress, tolerating O2 therapy well. Call bell in reach. 1436- repositioned to right side per client request, no distress, call bell in reach. 1554- vitals taken and , reassessment done, no distress, heated high flow NC on 30L/mon Fio2 50% R 15 O2 Sat 95%, call bell in reach.
0700- care of the patient assumed via bedside shift report, patient on heated hi flow NC, 16F hoffman, bed alarm on and call bell within reach. 4954- am medications administered , patient turned and repositioned  1050- turning refused by patient at this time  1145- up in bed eating lunch  1300- turning refused by patient.   1500- turning refused, family in room visiting  446 1104- turned repositioned, set up for dinner, scheduled medication administered
0700-Report received from LUIS CARLOS Segal RN. Assumed care of patient. Patient AxOx4. NRB in place weaned down to [place] NC 98%. No distress noted at this time. Bed in lowest position. CBWR.     1616-16 Romanian hoffman inserted. 400 ml of frederick color urine drained. No complications Patient tolerated well.
07:30- Pt sitting up in bed. Assessment completed. VSS. Pt states appetite. SSI given. 08:00- Pt ate all of breakfast. Pt took PO meds without difficulty. Pt aware of FR.     11:45- Pt sitting up in bed eating lunch, SSI given SQ.     14:15- Pt assisted OOB to BSC for BM. Pt tolerated well.
08/03/23 2000   RT Protocol   History Pulmonary Disease 0  (PT DENIES SMOKING HX)   Respiratory pattern 0   Breath sounds 2   Cough 0   Indications for Bronchodilator Therapy On home bronchodilators   Bronchodilator Assessment Score 2
08/07/23 1905   RT Protocol   History Pulmonary Disease 0  (pt denies smoking hx)   Respiratory pattern 0   Breath sounds 2   Cough 0   Indications for Bronchodilator Therapy Decreased or absent breath sounds; On home bronchodilators   Bronchodilator Assessment Score 2
1900 Assumed care of pt from off going nurse Alek Rubi RN.     2118 HS medications given. Tolerated well.    2200 Pt turned and repositioned. 2230 RT in to assist with CPAP treatment. 0000 VSS. In bed, resting quietly. 0430 Solu-Medrol given. Tolerated well. Bedside and Verbal shift change report given to FLORIAN Galvin RN (oncoming nurse) by .me (offgoing nurse). Report included the following information Nurse Handoff Report, Intake/Output, MAR, and Recent Results.
1900 Assumed care of pt from off going nurse JUAN Garvey RN. Pt is A&Ox4 with no c/o at this time. Shift assessment performed. 2108 HS medications passed. Tolerated well. 0000 VSS.     0400 In bed, resting quietly. Bedside and Verbal shift change report given to MARJORIE South (oncoming nurse) by Constantino Gong RN  (offgoing nurse). Report included the following information Intake/Output, MAR, and Recent Results.
1900 Assumed care of pt from off going nurse Robe Magallon RN. Pt is A&Ox4 , currently on hi-flow NC and son @bedside,with no c/o at this time. 2120 Pt experienced 1 large loose stool. Pad, diaper and sheets changed. 2140 HS medications given. Tolerated well. 2337 Pt currently on bipap.    0200 Pt c/o being uncomfortable. Pt turned on right side and supported by pillows behind back. Pillows under legs removed, per pt request.    0515 VSS. In bed, resting quietly. Bedside and Verbal shift change report given to MARJORIE Wiggins RN(oncoming nurse) by Patrizia Rasmussen RN  (offgoing nurse). Report included the following information Nurse Handoff Report, Intake/Output, MAR, and Recent Results.
5034- Bedside shift change report received by Adela Scott RN (offgoing nurse). Report included the following information Nurse Handoff Report, Index, ED Encounter Summary, ED SBAR, Intake/Output, MAR, Recent Results, and Cardiac Rhythm SR w/ depressed T, prolonged QT .     0717-AM glucose 320  0720-Patient assessment performed:    NEURO: a/o x 4  LUNGS: clear/dim, 30 L high flow; 02 sats 95%  HEART:  NSR, Periods of Bradycardiac ( 57), prolonged QT  ABDOMEN:  obese, abd soft/round/non-tender  GI/: 1500 FL restriction, 16f hoffman for strict I/O's  EXTREMITIES: moves all extremities   SKIN:  Dry skin    0720-RT at bedside for AM breathing treatment    0730-MD at bedside for rounding    0737-patient assisted with breakfast tray setup     0815- AM meds given    820-JRC NP at bedside    0845-Patient repositioned in bed, call light in  reach. 0930-patient placed on 4L NC by RT. O2 sats 96%    0950-patient maintaining sats > 92    1015-patient resting;call light in reach     1100-PT/OT at bedside, patient ambulated with use of walker; tolerated well, maintained 02 sats with NC    1300-patient now of 3 L NC Per RT. O2 sats 96%     1552-patient weaned to 2 L NC; sats 93%    1722-22G iv placed to right forearm; iv ABT resumed       1809-patient remains on 2 L NC, 02 Sats 95%, patient resting, no distress, call light in reach, family at bedside      I/O this shift:  In: 1370 [P.O.:1050;  I.V.:20; IV Piggyback:300]  Out: 2150 [Urine:2150]
Advance Care Planning     Advance Care Planning Inpatient Note  Spiritual Care Department    Today's Date: 8/8/2023  Unit: [facility] 2 ICU    Received request from rounding. Upon review of chart and communication with care team, patient's decision making abilities are not in question. . Patient was/were present in the room during visit. Goals of ACP Conversation:  Discuss advance care planning documents    Health Care Decision Makers:      Summary:  Verified Healthcare Decision Maker verbally. Advance Care Planning Documents (Patient Wishes):  Healthcare Power of /Advance Directive Appointment of [address] [employer]  Living Will/Advance Directive     Assessment:     08/08/23 1143   Encounter Summary   Encounter Overview/Reason  Initial Encounter   Service Provided For: Patient   Referral/Consult From: [address] [place] [address]   Last Encounter  08/08/23  (IV,SA,RRB)   Complexity of Encounter Moderate   Begin Time 1000   End Time  1010   Total Time Calculated 10 min   Encounter    Type Initial Screen/Assessment   Spiritual/Emotional needs   Type Spiritual Support   Advance Care Planning   Type ACP conversation   Assessment/Intervention/Outcome   Assessment Calm;Coping; Hopeful   Intervention Active listening;Empowerment;Prayer (assurance of)/[place]   Outcome Engaged in conversation;Expressed feelings of [place] and/or Love;Expressed Gratitude   Plan and Referrals   Plan/Referrals Continue to visit, (comment)         Interventions:  Provided education on documents for clarity and greater understanding  Encouraged ongoing ACP conversation with future decision makers and loved ones    Care Preferences Communicated:   No    Outcomes/Plan:  Teach Back Method used to verify the patient's and/or Healthcare Decision Maker's understanding of key information in the advance directive documents    Electronically signed by Chaplain Michi on 8/8/2023 at 11:45 AM
Albuterol 5 mg given via nebulizer. Patient transitioned from NRB mask to NC 6 lpm.  Will observe SPO2 and titrate as tolerated. 08/03/23 1702   Treatment   Treatment Type HHN   $Treatment Type $Inhaled Therapy/Meds   Medications Albuterol  (5mg)   Pre-Tx Pulse 101   Pre-Tx Resps 20   Breath Sounds Pre-Tx DELFINO Diminished   Breath Sounds Pre-Tx LLL Diminished   Breath Sounds Pre-Tx RUL Diminished   Breath Sounds Pre-Tx RML Diminished   Breath Sounds Pre-Tx RLL Diminished   Breath Sounds Post-Tx DELFINO Diminished   Breath Sounds Post-Tx LLL Diminished   Breath Sounds Post-Tx RUL Diminished   Breath Sounds Post-Tx RML Diminished   Breath Sounds Post-Tx RLL Diminished   Post-Tx Pulse 96   Post-Tx Resps 20   Delivery Source Mask   Position Semi-Bowen's   Treatment Tolerance Well   Duration 10   Is patient on O2?  Y   Oxygen Therapy/Pulse Ox   O2 Therapy Oxygen   O2 Device Non-rebreather mask   Pulse (!) 101   Respirations 20   SpO2 95 %
CARDIOLOGY CONSULTATION    REASON FOR CONSULT: CHF    REQUESTING PROVIDER: Maciel Mathew NP    CHIEF COMPLAINT:  Shortness of breath    HISTORY OF PRESENT ILLNESS:  Ino Mcghee is a 78y.o. year-old female with past medical history significant for diastolic heart failure with preserved ef 50-55%, DM type II, HTN, CKD, HLD,  who was evaluated today due to shortness of breath. Upon arrival in the ED she was found to be hypoxic with 02 sats in the 60's and placed on Bipap. VQ scan results with high probability for PE and started on [address] Patient examined at bedside this am resting in bed, she was transitioned to 2 l/m nc this am and doing well. She reports significant improvement in breathing. Continues to use Bipap at HS for KVNG. She denies CP or palpitations. Continues to endorse mild dyspnea on exertion. Encourage OOB to chair today. Records from hospital admission reviewed. Telemetry reviewed. No acute events overnight. SR rate 60's. Bradycardia overnight while asleep in the 40's. INPATIENT MEDICATIONS:  Home medications reviewed.     Current Facility-Administered Medications:     carvedilol (COREG) tablet 3.125 mg, 3.125 mg, Oral, BID WC, Nanette Mccord, APRN - CNP    ipratropium 0.5 mg-albuterol 2.5 mg (DUONEB) nebulizer solution 1 Dose, 1 Dose, Inhalation, Q4H PRN, Steven Felipe MD    methylPREDNISolone sodium succ (SOLU-MEDROL) 20 mg in sterile water 0.5 mL injection, 20 mg, IntraVENous, Q6H, Reinaldo Uriarte MD, 20 mg at 08/08/23 0434    insulin glargine (LANTUS) injection vial 10 Units, 10 Units, SubCUTAneous, Daily, Reinaldo Uriarte MD, 10 Units at 08/08/23 0758    apixaban (ELIQUIS) tablet 10 mg, 10 mg, Oral, BID, 10 mg at 08/08/23 0758 **FOLLOWED BY** [START ON 8/12/2023] apixaban (ELIQUIS) tablet 5 mg, 5 mg, Oral, BID, Reinaldo Uriarte MD    cefTRIAXone (ROCEPHIN) 1,000 mg in sodium chloride 0.9 % 50 mL IVPB (mini-bag), 1,000 mg, IntraVENous, Q24H, Steven Valerie Felipe MD, Stopped at 08/07/23
CARDIOLOGY CONSULTATION    REASON FOR CONSULT: CHF exacerbation with SOB    REQUESTING PROVIDER: Johnny Guadarrama NP    CHIEF COMPLAINT:  Shortness of breath    HISTORY OF PRESENT ILLNESS:  Lisa Ambrose is a 78y.o. year-old female with past medical history significant for diastolic heart failure with preserved ef 50-55%, DM type II, HTN, CKD, HLD,  who was evaluated today due to worsening shortness of breath over the course of two days. Upon arrival in the ED she was found to be hypoxic with 02 sats in the 60's and placed on Bipap. Patient examined at bedside this am with difficulty breathing, currently on a NRB and talking in short sentences. She denies CP or palpitations. Reports dyspnea on exertion, at rest and orthopnea. Endorses increase in fatigue, generalized weakness and chills. Denies fever, nausea or vomiting. Hospital course thus far shows CXR showing-slightly increased patchy lower lung opacities, likely atelectasis; pneumonia not completely excluded and cardiomegaly; D-dimer elevated 11.8,troponin's 67->88->113->92, Crt. 1.96, ABG with PH 7.25, PC02 73, PO2 105 and EKG showing SR with PVCs, Nonspecific T-wave abnormality, rate 98. Records from hospital admission reviewed. Telemetry reviewed. No acute events overnight. SR rate 70-80's. INPATIENT MEDICATIONS:  Home medications reviewed.     Current Facility-Administered Medications:     heparin 25,000 units in dextrose 5% 250 mL (premix) infusion, 5-30 Units/kg/hr, IntraVENous, Continuous, Oluwabunmi S Oba, APRN - NP, Last Rate: 16.2 mL/hr at 08/04/23 0716, 11 Units/kg/hr at 08/04/23 0716    cefTRIAXone (ROCEPHIN) 1,000 mg in sodium chloride 0.9 % 50 mL IVPB (mini-bag), 1,000 mg, IntraVENous, Q24H, Oluwabunmi S Oba, APRN - NP    azithromycin (ZITHROMAX) 500 mg in sodium chloride 0.9 % 250 mL IVPB ([id number]), 500 mg, IntraVENous, Q24H, Oluwabunmi S Oba, APRN - NP    insulin lispro (HUMALOG) injection vial 0-4 Units, 0-4 Units, SubCUTAneous,
CARDIOLOGY CONSULTATION    REASON FOR CONSULT: CHF exacerbation with SOB    REQUESTING PROVIDER: Sugey Alvarez NP    CHIEF COMPLAINT:  Shortness of breath    HISTORY OF PRESENT ILLNESS:  Neville Anne is a 78y.o. year-old female with past medical history significant for diastolic heart failure with preserved ef 50-55%, DM type II, HTN, CKD, HLD,  who was evaluated today due to worsening shortness of breath over the course of two days. Upon arrival in the ED she was found to be hypoxic with 02 sats in the 60's and placed on Bipap. Patient examined at bedside this am resting in bed on HFNC. She reports improvement in breathing. Using Bipap at HS. She denies CP or palpitations. Continues to endorse dyspnea on exertion and orthopnea. Encourage OOB to chair today. Records from hospital admission reviewed. Telemetry reviewed. No acute events overnight. SR rate 70-80's. INPATIENT MEDICATIONS:  Home medications reviewed.     Current Facility-Administered Medications:     methylPREDNISolone sodium succ (SOLU-MEDROL) 20 mg in sterile water 0.5 mL injection, 20 mg, IntraVENous, Q6H, Slim Horta MD, 20 mg at 08/07/23 0444    insulin glargine (LANTUS) injection vial 10 Units, 10 Units, SubCUTAneous, Daily, Slim Horta MD, 10 Units at 08/06/23 1110    apixaban (ELIQUIS) tablet 10 mg, 10 mg, Oral, BID, 10 mg at 08/06/23 2109 **FOLLOWED BY** [START ON 8/12/2023] apixaban (ELIQUIS) tablet 5 mg, 5 mg, Oral, BID, Slim Horta MD    cefTRIAXone (ROCEPHIN) 1,000 mg in sodium chloride 0.9 % 50 mL IVPB (mini-bag), 1,000 mg, IntraVENous, Q24H, Steven London MD, Stopped at 08/06/23 1639    azithromycin (ZITHROMAX) 500 mg in sodium chloride 0.9 % 250 mL IVPB ([id number]), 500 mg, IntraVENous, Q24H, Steven London MD, Stopped at 08/06/23 1749    insulin lispro (HUMALOG) injection vial 0-4 Units, 0-4 Units, SubCUTAneous, TID TERESA, Fallon Schulz, ROXY - NP, 2 Units at 08/06/23 1611    insulin lispro (HUMALOG) injection
Home 02 eval, pt lowest sats 80, needs QHS 02 bleed to home bipap machine and new settings 22/12, will need sleep eval for possible AVAPS machine, base line C02 has been 70. And 1 liter NC during rest, 2 liters with exertion.     08/09/23 0930   Resting (Room Air)   SpO2 85   HR 60   Resting (On O2)   SpO2 94   HR 63   O2 Device Nasal cannula   O2 Flow Rate (l/min) 1 l/min   During Walk (On O2)   SpO2 80   HR 63   O2 Device Other (comment)  (QHS, full face mask with 22/12, 28% back up rate 12)   O2 Flow Rate (l/min) 2 l/min  (equates to 2 liters vs 28% 02)   Need Additional O2 Flow Rate Rows Yes   Rate of Dyspnea 0   Symptoms Other (comment)  (low sats while on her unit ( home bipap and even increased  2-10 liters with no help, changed to hospital machine NIV 22/12, 28% 02 and back up rate 12)   Comments requires home bipap setting changes   After Walk   Does the Patient Qualify for Home O2 Yes   Liter Flow at Rest 1   Liter Flow on Exertion 2  (and QHS bleed into home biapap with new settings 24/12)   Does the Patient Need Portable Oxygen Tanks Yes
Hospitalist Progress Note               Daily Progress Note: 8/7/2023      Chief complaint:   Chief Complaint   Patient presents with    Altered Mental Status        Subjective:     8/3/23 admission course  Matthew Gardner is a 78 y.o. female with a past medical history significant for HTN, HLP, HFpEF (EF of 55%), DM-II, CKD stage III, KVNG (uses CPAP), morbid obesity, who presents to the ED today with complaints of progressively worsening shortness of breath in the last 2 days. History is obtained from the patient in the ICU. Patient states she has been having difficulty breathing in the last 2 days, it has been progressively worsening, and today 'I was out of it\". States shortness of breath is worsened on exertion. She is morbidly obese and uses CPAP nightly hence denies PND. States she saw her foot doctor today for a scheduled appointment and by the time she got back home, her breathing got worse and this prompted the ED visit. She denies any fever but admits to chills. Also complains of productive cough. She denies denies any nausea or vomiting. States today she had \" a little chest pain\". No extremity edema. No abdominal distention, no abdominal pain. No other complaints voiced. In the ED she was found hypoxic with O2 sats down to 67% on room air, chest x-ray shows slightly increased patchy lower lobe opacities. BNP 8041. ECG shows sinus rhythm/PVCs/prolonged QTc.  D-dimer was elevated at 11.8, creatinine is 1.96 at baseline, unable to use contrast.  She was placed on a nonrebreather mask, and blood gas came back showing pH of 7.25, PCO2 73, PO2 105, placed on BiPAP. Hospitalist was asked to admit. 8/4/23 CT chest IMPRESSION:  1. No acute cardiopulmonary process. 2.  Right upper lobe pulmonary nodule without significant change. Recommend  follow-up in 6 months. 3.  Right basilar volume loss.     8/4/23 NM ventilation perfusion scan IMPRESSION:  Multiple bilateral segmental perfusion defects,
Hospitalist Progress Note             Date of Service:  2023  NAME:  Catalina Petty  :  1945  MRN:  [id number]    Assessment & Plan:      #1: Acute respiratory failure with hypoxia and hypercapnia:  -suspect 2/2 CHF exacerbation, and/or Pneumonia+ copd exacerbation-->acute PE remains in the differential.   -With worsening dyspnea x2 days, O2sats 67% on RA in ED. D-dimer 11.38, lactate 3.7. -CXR shows slightly increased patchy lower lobe opacities. - reviewed ABG with resp- little change from piror abg's, little change after bipap  - now tolerating high flow 02, asking to eat  - cont treatment of chf, copd, renata, probable PE as below     #2: Acute on chronic HFpEF:  -pro-BNP is 8041, CXR-cardiomegaly,   -had a recent Echo 2023--which shows an Ef of 50 to 93%, grade 1 diastolic dysfunction, dilated LA, RA, moderate TR.  -ECG shows sinus rhythm/98/PVCs, prolonged QT  -Troponin elevated 67-->83--> cont to trend.   -cont aspirin, BB, statin,   -start lasix 40mg IV BID. Strict I/o  -Cardiology consultation. #3: Possible lobar pneumonia:- ceftriaxone/zpak, fu cult, awaiting CT results  -Although afebrile and without leukocytosis, she has lactic acidosis 3.7 --3.2   -we will continue empiric antibiotic coverage with ceftriaxone and azithromycin,  Chest CT w/o contrast - no acute abnormality seen, makes PE even more likely, nodule seen as prior, repeat  ct 6 months    Pulmonary embolism- probable per VQ, cont heparin drip until more stable, then will need eliquis  - awaiting echo, le PVL     Morbid obesity, RENATA (on CPAP):  -Chronic issues, uses CPAP in the home setting. sp BiPAP. #5: Hypertension:  -Chronic issue, normotensive. Monitor vitals. #6: Chronic kidney disease stage III:  -Creatinine level is 1.9, at baseline. #7: Hyperlipidemia:  -cont atorvastatin.      #8: Diabetes mellitus type 2:  -cont
Hospitalist Progress Note             Date of Service:  2023  NAME:  Matthew Gardner  :  1945  MRN:  [id number]    Assessment & Plan:      Acute on chronic respiratory failure, currently has both hypoxic and hypercarbic failure  - it appears her chornic issues are mostly in the KVNG/hypoventilaation arena, for which she seems compliant with cpap, but could certainly use some wt loss  - her historical abg's, all show a chronic resp acidosis, only partially compensated for  - her acute issues appear to be more related to hypoxemia, while she has VQ scan with high probabiliy for PE, and some R heart dilation, I do not want to  miss any other causes for her new sympotms, her PVL of LE's in neg for dvt  - PE- sp hep drip, cont eliquis  - infective sources, possible pna- cont azithromycin/ceftiaxone- procal not elevated, would stop all abx at 7 days total (continuing due to elevated lactic acid present on admission)  - CHF,diastolic- cont lasix 56MY iv bid, only neg 300, and not putting out much, ef preserved, not much out if I/o are correct but bnp has gone from 8k to 3k, if anything cr is improving, will continue to use lasix for now, titrate based on renal funtion,would dc the moment we see cr rising  - no hx copd, consider full PFT as outpt- in meanwhile she has mild wheeze on exam- give trial steridos/nebs- no wheeze, dc     Plan of dc home tomorrow with changes to cpap, 02, and near term pulm appt        Hypertension:  -Chronic issue, normotensive. Monitor vitals. Chronic kidney disease stage III:  -Creatinine level is 1.5, at baseline.- if rises any further dc lasix, recheck 4pm     #7: Hyperlipidemia:  -cont atorvastatin. #8: Diabetes mellitus type 2:- had great control on low dose ins ss prior to steriods, elevated bs, dc steriods, trend  -cont accu-checks, achs, sliding scale insulin coverage as needed.
Hospitalist Progress Note             Date of Service:  2023  NAME:  Rancho Guzman  :  1945  MRN:  [id number]         Assessment & Plan:     Acute on chronic respiratory failure, currently has both hypoxic and hypercarbic failure  - it appears her chornic issues are mostly in the KVNG/hypoventilaation arena, for which she seems compliant with cpap, but could certainly use some wt loss  - her historical abg's, all show a chronic resp acidosis, only partially compensated for  - her acute issues appear to be more related to hypoxemia, while she has VQ scan with high probabiliy for PE, and some R heart dilation, I do not want to  miss any other causes for her new sympotms, her PVL of LE's in neg for dvt  - PE- change hep drip to eliquis  - infective sources, possible pna- cont azithromycin/ceftiaxone  - CHF,diastolic- cont lasix 26TN iv bid, only neg 300, and not putting out much, ef preserved,   - no hx copd, consider full PFT as outpt- in meanwhile she has mild wheeze on exam- give trial steridos/nebs  - currently on hihg flow 02 50 liters per min, and fiow 60    Hypertension:  -Chronic issue, normotensive. Monitor vitals. Chronic kidney disease stage III:  -Creatinine level is 2.1, at baseline.- if rises any further dc lasix, recheck 4pm     #7: Hyperlipidemia:  -cont atorvastatin. #8: Diabetes mellitus type 2:- great control on low dose ins ss, will watch now I that I started solumedrol  -cont accu-checks, achs, sliding scale insulin coverage as needed. Hold glipizide for now.  -Initiate hypoglycemic protocol as needed. Remain in icu, crit care time 1 hour    Review of Systems:   Pertinent items are noted in HPI. Vital Signs:    Last 24hrs VS reviewed since prior progress note.  Most recent are:  Vitals:    23 0808   BP: (!) 110/50   Pulse: 72   Resp:    Temp:    SpO2:          Intake/Output
[employer]    Physical Exam  Skin:             9464- Bedside shift change report given to Teddy Szymanski RN (oncoming nurse) by [facility] RN (offgoing nurse). Report included the following information Nurse Handoff Report, Index, ED Encounter Summary, ED SBAR, Intake/Output, MAR, Recent Results, and Cardiac Rhythm SB inverted T .     1915- RN to bedside, patient denies pain, SOB or needs at this time. Patient denies repositioning/turning at this time. VSS, SpO2 97% on HFNC 30L 45%. 2100- Scheduled PO medications given at this time without difficulty. Patient states she would prefer repositioning after snack. 2145- Patient repositioned at this time, tolerates well, requires moderate assistance. Patient denies needs at this time. 2330- Reassessment completed at this time, no changes noted. Patient rests in bed with eyes closed, respirations even and unlabored. No evidence of distress or discomfort. VSS    0000- Rt at bedside. FiO2 40% remains at 30L    0200- Patient rests in bed with eyes closed, respirations even and unlabored. 18- Phlebo at bedside. Morning labs drawn at this time. 0500- Patient requests snack at this time, provided. 0600- Patient requests lotion to upper back, dry flaky skin, patient states she itches. No redness or hives noted. Cream applied to upper back, shoulders and L arm. Patient states this happens every admission she receives antibiotics. Patient currently on rocephin and azithromycin. NKDA listed in patient chart at this time. Patient requests benadryl. 1034- Bedside shift change report given to FLORIAN Milan (oncoming nurse) by Teddy Szymanski RN (offgoing nurse). Report included the following information Nurse Handoff Report, Index, ED Encounter Summary, ED SBAR, Intake/Output, MAR, Recent Results, and Cardiac Rhythm SR w/ depressed T, prolonged QT .
Nutrition Assessment     Type and Reason for Visit: Reassessment    Nutrition Recommendations/Plan:   Continue with soft and bite sized dysphagia diet; diabetic / cardiac     Malnutrition Assessment:  Malnutrition Status: No malnutrition    Nutrition Assessment:  Patient is a 79 yo woman with pmhx sig for diastolic heart failure with preserved ef 50-55%, DM type II, HTN, CKD, HLD. Appetite and intake of current diabetic, cardiac, soft and bite sized diet is reportedly adequate. No c/o N/V/D, although patient shows ~11% wt loss x3 months, which is clinically sig although  patient not at risk for malnutrition. Patient may do well with some more controlled weight loss, although not appropriate at this time. 8/7/23 UPDATE: No new nutritional updates. Appetite and intake remain adequate. Estimated Daily Nutrient Needs:  Energy (kcal):  1900-2375kcal/d Weight Used for Energy Requirements: Adjusted     Protein (g):  76-95kcal/d Weight Used for Protein Requirements: Adjusted        Fluid (ml/day):  ~2L daily Method Used for Fluid Requirements: 1 ml/kcal    Nutrition Related Findings:   BG stable, slightly elevated ~200 avg Wound Type: None    Current Nutrition Therapies:    ADULT DIET; Dysphagia - Soft and Bite Sized; 3 carb choices (45 gm/meal);  Low Fat/Low Chol/High Fiber/JOAQUINA    Anthropometric Measures:  Height: 5' 6\" (167.6 cm)  Current Body Wt: 325 lb (147.4 kg)   BMI: 52.5    Nutrition Diagnosis:   Overweight/Obese related to excessive energy intake, endocrine dysfuntion, cardiac dysfunction as evidenced by BMI    Nutrition Interventions:   Food and/or Nutrient Delivery: Continue Current Diet  Nutrition Education/Counseling: No recommendation at this time  Coordination of Nutrition Care: Continue to monitor while inpatient       Goals:     Goals: Meet at least 75% of estimated needs, prior to discharge       Nutrition Monitoring and Evaluation:   Behavioral-Environmental Outcomes: None
Patient continues to tolerated HFNC with no distress noted. FIO2 and flow lowered. Will continue to monitor and titrate as tolerated.        08/05/23 1114   Oxygen Therapy/Pulse Ox   O2 Device Heated high flow cannula   O2 Flow Rate (L/min) (S)  40 L/min   FiO2  (S)  50 %   Pulse 66   Respirations 20   SpO2 95 %
Patient off unit for VQ scan.
Patient placed on her home cpap device with 2L oxygen bled in to system, SpO2 97%
Patient tolerated BIPAP well overnight. Nebulizer TX given this morning. Patient then transitioned to HFNC. Patient speaking in full sentences with no distress noted. Will continue to monitor and titrate as tolerated.        08/05/23 0746   Oxygen Therapy/Pulse Ox   O2 Therapy Oxygen   O2 Device (S)  Heated high flow cannula   O2 Flow Rate (L/min) 50 L/min   FiO2  (S)  70 %   Pulse 73   Respirations 20   SpO2 96 %
Physical Therapy  Facility/Department: [facility] 2 ICU  Daily Treatment Note  NAME: Miryam Glass  : 1945  MRN: [id number]    Date of Service: 2023    Discharge Recommendations:  Home with Home health PT   PT Equipment Recommendations  Equipment Needed: No    Patient Diagnosis(es): The primary encounter diagnosis was Acute respiratory distress. Diagnoses of Shortness of breath, Hypoxia, Elevated d-dimer, and Chronic kidney disease, unspecified CKD stage were also pertinent to this visit. Assessment   Pt is eager to participate with PT. Pt comes to sitting without difficulty. Pt ambulated on 2L 2 bouts of gait with cues to stand erect and avoid leaning on RW. SpO2 WNL noted moderate dyspnea. Educated pt on managing O2 tubing when walking in her home. On second bout of gait Pt demonstrated O2 tube management. Pt demos good safety during treatment. After treatment:   []         Patient left in no apparent distress sitting up in chair  [x]         Patient left in no apparent distress in bed  [x]         Call bell left within reach  []         Nursing notified  []         Caregiver/Family present  []         Bed alarm activated  []         SCDs applied     Activity Tolerance: Patient limited by fatigue  Equipment Needed: No     Plan    Physcial Therapy Plan  General Plan: Continue with current plan     Restrictions  Restrictions/Precautions  Restrictions/Precautions:  Other (comment) (Recent healing of R heel ulcer)     Subjective    Subjective  Subjective: Pt stated \"I just feel short winded\"  Pain: N/A     Objective   Vitals  SpO2: 93 %  O2 Device: Nasal cannula (2l)  Bed Mobility Training  Bed Mobility Training: Yes  Supine to Sit: Modified independent  Sit to Supine: Modified independent  Scooting: Modified independent  Balance  Sitting: Intact  Standing: Intact  Transfer Training  Transfer Training: Yes  Sit to Stand: Supervision;Modified independent  Stand to Sit: Supervision;Modified independent  Stand
Physical Therapy  Facility/Department: [facility] 2 ICU  Daily Treatment Note  NAME: Yariel Conde  : 1945  MRN: [id number]    Date of Service: 2023    Discharge Recommendations:  Home with Home health PT   PT Equipment Recommendations  Equipment Needed: No    Patient Diagnosis(es): The primary encounter diagnosis was Acute respiratory distress. Diagnoses of Shortness of breath, Hypoxia, Elevated d-dimer, and Chronic kidney disease, unspecified CKD stage were also pertinent to this visit. Assessment   Pt is eager to participate with PT. Pt comes to sitting without difficulty. She performed 3 sit to stands at bed sids. Pt ambulated without O2 Pt dropped between 85-88% with no dizziness just SOB with exertion. She performed one bout of gait with cues to stand erect and avoid leaning on RW. Pt sat in recliner chair and is left with nursing to meet needs. Activity Tolerance: Patient limited by fatigue  Equipment Needed: No     Plan    Physcial Therapy Plan  General Plan: Continue with current plan     Restrictions  Restrictions/Precautions  Restrictions/Precautions: Other (comment) (Recent healing of R heel ulcer)     Subjective    Subjective  Subjective: Reported feeling SOB  Pain: N/A     Objective   Vitals  Comment: See nursing noted for vitales during treatmnet.   Bed Mobility Training  Bed Mobility Training: Yes  Supine to Sit: Supervision;Modified independent  Sit to Supine: Supervision;Modified independent  Scooting: Supervision;Modified independent  Balance  Sitting: Intact  Standing: Intact  Transfer Training  Transfer Training: Yes  Sit to Stand: Supervision  Stand to Sit: Supervision  Stand Pivot Transfers: Supervision  Gait Training  Gait Training: Yes  Gait  Base of Support: Widened  Speed/Linn: Shuffled  Distance (ft): 40 Feet  Assistive Device: Walker, rolling                   Goals  Short Term Goals  Time Frame for Short Term Goals: 7 days  Short Term Goal 1: Pt will perform bed mobility with
Pt placed on home NIV 17/12 with 2L O2 bleed in. Pt tolerating well. NAD. Will continue to monitor.
Pt placed on home NIV 17/12 with 2L O2 bleed in. SPO2 90%. Pt tolerating well. NAD. Will continue to monitor.
Pt unable to maintain sats on Home NIV 17/12 with 2L bleed in. Flow increased to 10L without improvement in SPO2 still 81%. Pt placed on V60 NIV 22/12 F18 28%. SPO2 now 100%. Pt tolerating well. No further complications noted. Pt may benefit from increase in home NIV settings. Will let hospitalist know. AND. Will continue to monitor.
Pt with high likelihood of P.E. will hold on P.T. eval at this time until pt is stable and has been on heparin drip for at least 24 hours.    Socrates Cornea, PT, DPT
Repeat creatinine 1.88. Continue diuresis for now.  Repeat labs in AM.
Review of Systems   Constitutional:  Negative for chills. Physical Exam  Skin:             5874- Bedside shift change report given to Elba Hoyos RN (oncoming nurse) by Olivia Cummins RN (offgoing nurse). Report included the following information Nurse Handoff Report, Index, ED Encounter Summary, ED SBAR, Intake/Output, MAR, Recent Results, and Cardiac Rhythm SR . Received patient resting in bed, on HFNC, visitors at bedside. 0- RN to bedside for shift assessment. Patient currently A+Ox4, calm and cooperative, denies pain, SOB or discomfort at this time. Patient requests drink at this time, provided. 2045- Scheduled PO medication to be given at this time, patient requests snack, provided. 2215- Patient repositioned at this time, HFNC remains on at this time, CPAP to remain off.    2345- Reassessment completed at this time, no changes noted. Patient rests in bed with eyes closed, respirations even and unlabored. 0200- Patient rests in bed, no signs of distress. Respirations even and unlabored. 0400- Phlebo at bedside for morning labs. 18- Reassessment completed at this time, no changes noted. VSS. Respirations even and unlabored, patient denies pain, SOB, discomfort or needs at this time. 8195- Bedside shift change report given to Jaylen Arnold (oncoming nurse) by Elba Hoyos RN (offgoing nurse).  Report included the following information Nurse Handoff Report, Index, ED Encounter Summary, ED SBAR, Intake/Output, MAR, Recent Results, and Cardiac Rhythm SB .
Voice message left with [facility], office to call patient with appointment.  Patient aware
1 L O2 via NC - no distress, call bell in reach. Up till 1500:  I/O this shift: In: 800 [P.O.:800]  Out: 9470 [UGVCP:0699]    3524- iv antibiotic hand and iv cortisone given via MAR order. IV side intact - no distress. 1538- vital signs taken, no distress, call bell in reach, sitting up in recliner chair - watching TV. Reassessment done - no acute changes. 1542- BS 277mgl/dl    1600- iv antibiotic hang, and oral med given, insulin 2 units given via sliding scale. 1605- seen by RT - discontinued O2 - via NC - continue to monitor, O2 Sat. 94% R 10. Continues to use incentive spirometer independently. 1630- set up in chair for dinner, no RT distress, call bell in reach. 1720- assisted to bedside commode - had a BM, returned to bed - clean brief applied, hoffman emptied 1300 ml - clear yellow straw, no distress, call bell in reach, no 02 room air - maintaining O2 Sat at 91 %. 1744- back on 1 L via NC 02 - Sat's dropping to 85% on room air - no distress. Up to 92% on 1 L via NC. I/O this shift:  In: 1740.8 [P.O.:1400; IV Piggyback:340.8]  Out: 2300 [Urine:2300]     End of shift changes: Back on 1 L of O2 via NC to maintain O2 Sat's above 90%.     1845- Off-going bedside shift report given to: Kana Dewey
Q24H, Steven Knowles MD, Stopped at 08/08/23 1600    azithromycin (ZITHROMAX) 500 mg in sodium chloride 0.9 % 250 mL IVPB ([id number]), 500 mg, IntraVENous, Q24H, Steven Knowles MD, Stopped at 08/08/23 1710    insulin lispro (HUMALOG) injection vial 0-4 Units, 0-4 Units, SubCUTAneous, TID WC, Olmanjuabunmi S Debbie Dominga, APRN - NP, 2 Units at 08/09/23 0725    insulin lispro (HUMALOG) injection vial 0-4 Units, 0-4 Units, SubCUTAneous, Nightly, Theodore Acuna, APRN - NP, 4 Units at 08/08/23 2127    glucose chewable tablet 16 g, 4 tablet, Oral, PRN, Oluwabunmi S Oba, APRN - NP    dextrose bolus 10% 125 mL, 125 mL, IntraVENous, PRN **OR** dextrose bolus 10% 250 mL, 250 mL, IntraVENous, PRN, Oluwabunmi S Oba, APRN - NP    glucagon injection 1 mg, 1 mg, SubCUTAneous, PRN, Oluwabunmi S Oba, APRN - NP    dextrose 10 % infusion, , IntraVENous, Continuous PRN, Oluwabunmi S Oba, APRN - NP    albuterol (PROVENTIL) (2.5 MG/3ML) 0.083% nebulizer solution 2.5 mg, 2.5 mg, Nebulization, Q4H PRN, Oluwabunmi S Oba, APRN - NP    acetaminophen (TYLENOL) tablet 650 mg, 650 mg, Oral, Q4H PRN, Oluwabunmi S Oba, APRN - NP    aspirin chewable tablet 81 mg, 81 mg, Oral, Daily, Oluwabunmi S Oba, APRN - NP, 81 mg at 08/09/23 0802    atorvastatin (LIPITOR) tablet 20 mg, 20 mg, Oral, QHS, Oluwabunmi S Oba, APRN - NP, 20 mg at 08/08/23 2124     ALLERGIES:  Allergies reviewed with the patient,No Known Allergies . FAMILY HISTORY:  Family history reviewed. SOCIAL HISTORY:  Notable for former tobacco use, no heavy alcohol or illicit drug use. REVIEW OF SYSTEMS:  Complete review of systems performed, pertinents noted above, all other systems are negative. PHYSICAL EXAMINATION:   VS:  /75, HR 64. BMI 54.99  General:  Morbidly obese, Alert and oriented. Cardiovascular:  Rate and rhythm regular, No murmur  Respiratory:  Lungs diminished throughout. No wheezing, no crackles.    Abdomen:  Obses, soft, nontender  Extremities:  No lower extremity
solution 1 Dose  1 Dose Inhalation Q4H WA RT    acetaminophen (TYLENOL) tablet 650 mg  650 mg Oral Q4H PRN    aspirin chewable tablet 81 mg  81 mg Oral Daily    atorvastatin (LIPITOR) tablet 20 mg  20 mg Oral QHS    carvedilol (COREG) tablet 6.25 mg  6.25 mg Oral BID with meals     ______________________________________________________________________  EXPECTED LENGTH OF STAY: 2  ACTUAL LENGTH OF STAY:          3                 Ricci Ashraf MD
management    Apixaban    Aspirin    Atorvastatin    Carvedilol - held for bradycardia    Furosemide    3) Acute kidney injury on chronic kidney disease    4) Diabetes mellitus     Sliding scale lispro as needed    5) DVT prophylaxis with apixaban    Code status: full    Social determinants of health:     Estimated discharge date//time frame/disposition: 1-2 days  Dispo anticipate home with home health    Barriers to discharge: clinical improvement    Total time spent with patient: 38 mins    Jordi Mata MD

## 2023-08-10 NOTE — DISCHARGE SUMMARY
of stay of 2-3 midnights. Discharge disposition: Home, with outpatient follow-ups, pending clinical course. DISCHARGE DIAGNOSES / PLAN:      Assessment & Plan:      Acute on chronic respiratory failure, currently has both hypoxic and hypercarbic failure  - it appears her chornic issues are mostly in the KVNG/hypoventilaation arena, for which she seems compliant with cpap, but could certainly use some wt loss  - her historical abg's, all show a chronic resp acidosis, only partially compensated for  - her acute issues appear to be more related to hypoxemia, while she has VQ scan with high probabiliy for PE, and some R heart dilation, I do not want to  miss any other causes for her new sympotms, her PVL of LE's in neg for dvt  - PE- sp hep drip, cont eliquis  - infective sources, possible pna- cont azithromycin/ceftiaxone- procal not elevated, would stop all abx at 7 days total (continuing due to elevated lactic acid present on admission)  - CHF,diastolic- cont lasix 43JQ iv bid, only neg 300, and not putting out much, ef preserved, not much out if I/o are correct but bnp has gone from 8k to 3k, if anything cr is improving, will continue to use lasix for now, titrate based on renal funtion,would dc the moment we see cr rising  - no hx copd, consider full PFT as outpt- in meanwhile she has mild wheeze on exam- give trial steridos/nebs- no wheeze, dc      Plan of dc home tomorrow with changes to cpap, 02, and near term pulm appt         Hypertension:  -Chronic issue, normotensive. Monitor vitals. Chronic kidney disease stage III:  -Creatinine level is 1.5, at baseline.- if rises any further dc lasix, recheck 4pm     #7: Hyperlipidemia:  -cont atorvastatin. #8: Diabetes mellitus type 2:- had great control on low dose ins ss prior to steriods, elevated bs, dc steriods, trend  -cont accu-checks, achs, sliding scale insulin coverage as needed.   Hold glipizide for now.  -Initiate hypoglycemic protocol as

## 2024-05-12 ENCOUNTER — HOSPITAL ENCOUNTER (INPATIENT)
Age: 79
LOS: 1 days | Discharge: HOME | DRG: 191 | End: 2024-05-13
Attending: EMERGENCY MEDICINE | Admitting: INTERNAL MEDICINE
Payer: MEDICARE

## 2024-05-12 ENCOUNTER — APPOINTMENT (OUTPATIENT)
Age: 79
DRG: 191 | End: 2024-05-12
Payer: MEDICARE

## 2024-05-12 DIAGNOSIS — R06.03 ACUTE RESPIRATORY DISTRESS: ICD-10-CM

## 2024-05-12 DIAGNOSIS — Z86.79 H/O CHF: ICD-10-CM

## 2024-05-12 DIAGNOSIS — E66.2 OBESITY HYPOVENTILATION SYNDROME (HCC): ICD-10-CM

## 2024-05-12 DIAGNOSIS — R60.0 EDEMA OF RIGHT UPPER ARM: Primary | ICD-10-CM

## 2024-05-12 DIAGNOSIS — J44.1 COPD EXACERBATION (HCC): ICD-10-CM

## 2024-05-12 LAB
ALBUMIN SERPL-MCNC: 2.9 G/DL (ref 3.4–5)
ALBUMIN/GLOB SERPL: 0.4 (ref 0.8–1.7)
ALP SERPL-CCNC: 173 U/L (ref 45–117)
ALT SERPL-CCNC: 22 U/L (ref 13–56)
ANION GAP SERPL CALC-SCNC: 4 MMOL/L (ref 3–18)
APPEARANCE UR: CLEAR
AST SERPL W P-5'-P-CCNC: 19 U/L (ref 10–38)
BACTERIA URNS QL MICRO: ABNORMAL /HPF
BASOPHILS # BLD: 0 K/UL (ref 0–0.1)
BASOPHILS NFR BLD: 0 % (ref 0–2)
BILIRUB SERPL-MCNC: 1.2 MG/DL (ref 0.2–1)
BILIRUB UR QL: NEGATIVE
BNP SERPL-MCNC: 560 PG/ML (ref 0–1800)
BUN SERPL-MCNC: 22 MG/DL (ref 7–18)
BUN/CREAT SERPL: 14 (ref 12–20)
CA-I BLD-MCNC: 9.1 MG/DL (ref 8.5–10.1)
CHLORIDE SERPL-SCNC: 96 MMOL/L (ref 100–111)
CK SERPL-CCNC: 139 U/L (ref 26–192)
CO2 SERPL-SCNC: 40 MMOL/L (ref 21–32)
COLOR UR: YELLOW
CREAT SERPL-MCNC: 1.56 MG/DL (ref 0.6–1.3)
D DIMER PPP FEU-MCNC: 3.67 UG/ML(FEU)
DIFFERENTIAL METHOD BLD: ABNORMAL
EOSINOPHIL # BLD: 0.1 K/UL (ref 0–0.4)
EOSINOPHIL NFR BLD: 2 % (ref 0–5)
EPITH CASTS URNS QL MICRO: ABNORMAL /LPF (ref 0–20)
ERYTHROCYTE [DISTWIDTH] IN BLOOD BY AUTOMATED COUNT: 16 % (ref 11.6–14.5)
FLUAV RNA SPEC QL NAA+PROBE: NOT DETECTED
FLUBV RNA SPEC QL NAA+PROBE: NOT DETECTED
GLOBULIN SER CALC-MCNC: 6.9 G/DL (ref 2–4)
GLUCOSE BLD STRIP.AUTO-MCNC: 222 MG/DL (ref 70–110)
GLUCOSE BLD STRIP.AUTO-MCNC: 294 MG/DL (ref 70–110)
GLUCOSE BLD STRIP.AUTO-MCNC: 371 MG/DL (ref 70–110)
GLUCOSE SERPL-MCNC: 139 MG/DL (ref 74–99)
GLUCOSE UR STRIP.AUTO-MCNC: NEGATIVE MG/DL
HCT VFR BLD AUTO: 45.5 % (ref 35–45)
HGB BLD-MCNC: 14 G/DL (ref 12–16)
HGB UR QL STRIP: ABNORMAL
IMM GRANULOCYTES # BLD AUTO: 0 K/UL (ref 0–0.04)
IMM GRANULOCYTES NFR BLD AUTO: 0 % (ref 0–0.5)
KETONES UR QL STRIP.AUTO: NEGATIVE MG/DL
LACTATE SERPL-SCNC: 2.4 MMOL/L (ref 0.4–2)
LACTATE SERPL-SCNC: 2.6 MMOL/L (ref 0.4–2)
LACTATE SERPL-SCNC: 2.8 MMOL/L (ref 0.4–2)
LEUKOCYTE ESTERASE UR QL STRIP.AUTO: ABNORMAL
LYMPHOCYTES # BLD: 1.5 K/UL (ref 0.9–3.6)
LYMPHOCYTES NFR BLD: 19 % (ref 21–52)
MAGNESIUM SERPL-MCNC: 1.9 MG/DL (ref 1.6–2.6)
MCH RBC QN AUTO: 29 PG (ref 24–34)
MCHC RBC AUTO-ENTMCNC: 30.8 G/DL (ref 31–37)
MCV RBC AUTO: 94.4 FL (ref 78–100)
MONOCYTES # BLD: 0.6 K/UL (ref 0.05–1.2)
MONOCYTES NFR BLD: 7 % (ref 3–10)
NEUTS SEG # BLD: 5.7 K/UL (ref 1.8–8)
NEUTS SEG NFR BLD: 72 % (ref 40–73)
NITRITE UR QL STRIP.AUTO: POSITIVE
NRBC # BLD: 0.03 K/UL (ref 0–0.01)
NRBC BLD-RTO: 0.4 PER 100 WBC
PERFORMED BY:: ABNORMAL
PH UR STRIP: 5.5 (ref 5–8)
PLATELET # BLD AUTO: 273 K/UL (ref 135–420)
PMV BLD AUTO: 10 FL (ref 9.2–11.8)
POTASSIUM SERPL-SCNC: 3.5 MMOL/L (ref 3.5–5.5)
PROT SERPL-MCNC: 9.8 G/DL (ref 6.4–8.2)
PROT UR STRIP-MCNC: NEGATIVE MG/DL
RBC # BLD AUTO: 4.82 M/UL (ref 4.2–5.3)
RBC #/AREA URNS HPF: ABNORMAL /HPF (ref 0–2)
SARS-COV-2 RNA RESP QL NAA+PROBE: NOT DETECTED
SODIUM SERPL-SCNC: 140 MMOL/L (ref 136–145)
SP GR UR REFRACTOMETRY: 1.01 (ref 1–1.03)
TROPONIN I SERPL HS-MCNC: 14 NG/L (ref 0–54)
TROPONIN I SERPL HS-MCNC: 16 NG/L (ref 0–54)
URATE SERPL-MCNC: 12.6 MG/DL (ref 2.6–7.2)
UROBILINOGEN UR QL STRIP.AUTO: 0.2 EU/DL (ref 0.2–1)
WBC # BLD AUTO: 8 K/UL (ref 4.6–13.2)
WBC URNS QL MICRO: ABNORMAL /HPF (ref 0–4)

## 2024-05-12 PROCEDURE — 87186 SC STD MICRODIL/AGAR DIL: CPT

## 2024-05-12 PROCEDURE — 6360000002 HC RX W HCPCS: Performed by: EMERGENCY MEDICINE

## 2024-05-12 PROCEDURE — 96365 THER/PROPH/DIAG IV INF INIT: CPT

## 2024-05-12 PROCEDURE — 6360000002 HC RX W HCPCS: Performed by: NURSE PRACTITIONER

## 2024-05-12 PROCEDURE — 83550 IRON BINDING TEST: CPT

## 2024-05-12 PROCEDURE — 94640 AIRWAY INHALATION TREATMENT: CPT

## 2024-05-12 PROCEDURE — 83605 ASSAY OF LACTIC ACID: CPT

## 2024-05-12 PROCEDURE — 99285 EMERGENCY DEPT VISIT HI MDM: CPT

## 2024-05-12 PROCEDURE — 73130 X-RAY EXAM OF HAND: CPT

## 2024-05-12 PROCEDURE — 81001 URINALYSIS AUTO W/SCOPE: CPT

## 2024-05-12 PROCEDURE — 87088 URINE BACTERIA CULTURE: CPT

## 2024-05-12 PROCEDURE — 83880 ASSAY OF NATRIURETIC PEPTIDE: CPT

## 2024-05-12 PROCEDURE — 96367 TX/PROPH/DG ADDL SEQ IV INF: CPT

## 2024-05-12 PROCEDURE — 2580000003 HC RX 258: Performed by: EMERGENCY MEDICINE

## 2024-05-12 PROCEDURE — 87636 SARSCOV2 & INF A&B AMP PRB: CPT

## 2024-05-12 PROCEDURE — 83735 ASSAY OF MAGNESIUM: CPT

## 2024-05-12 PROCEDURE — 94761 N-INVAS EAR/PLS OXIMETRY MLT: CPT

## 2024-05-12 PROCEDURE — 71045 X-RAY EXAM CHEST 1 VIEW: CPT

## 2024-05-12 PROCEDURE — 2580000003 HC RX 258: Performed by: NURSE PRACTITIONER

## 2024-05-12 PROCEDURE — 80053 COMPREHEN METABOLIC PANEL: CPT

## 2024-05-12 PROCEDURE — 87040 BLOOD CULTURE FOR BACTERIA: CPT

## 2024-05-12 PROCEDURE — 6370000000 HC RX 637 (ALT 250 FOR IP): Performed by: EMERGENCY MEDICINE

## 2024-05-12 PROCEDURE — 82962 GLUCOSE BLOOD TEST: CPT

## 2024-05-12 PROCEDURE — 84484 ASSAY OF TROPONIN QUANT: CPT

## 2024-05-12 PROCEDURE — 82550 ASSAY OF CK (CPK): CPT

## 2024-05-12 PROCEDURE — 84550 ASSAY OF BLOOD/URIC ACID: CPT

## 2024-05-12 PROCEDURE — 2700000000 HC OXYGEN THERAPY PER DAY

## 2024-05-12 PROCEDURE — 85025 COMPLETE CBC W/AUTO DIFF WBC: CPT

## 2024-05-12 PROCEDURE — 1100000000 HC RM PRIVATE

## 2024-05-12 PROCEDURE — 85379 FIBRIN DEGRADATION QUANT: CPT

## 2024-05-12 PROCEDURE — 87086 URINE CULTURE/COLONY COUNT: CPT

## 2024-05-12 PROCEDURE — 6370000000 HC RX 637 (ALT 250 FOR IP): Performed by: NURSE PRACTITIONER

## 2024-05-12 PROCEDURE — 96375 TX/PRO/DX INJ NEW DRUG ADDON: CPT

## 2024-05-12 PROCEDURE — 93005 ELECTROCARDIOGRAM TRACING: CPT | Performed by: EMERGENCY MEDICINE

## 2024-05-12 RX ORDER — ACETAMINOPHEN 325 MG/1
650 TABLET ORAL EVERY 6 HOURS PRN
Status: DISCONTINUED | OUTPATIENT
Start: 2024-05-12 | End: 2024-05-13 | Stop reason: HOSPADM

## 2024-05-12 RX ORDER — POLYETHYLENE GLYCOL 3350 17 G/17G
17 POWDER, FOR SOLUTION ORAL DAILY PRN
Status: DISCONTINUED | OUTPATIENT
Start: 2024-05-12 | End: 2024-05-13 | Stop reason: HOSPADM

## 2024-05-12 RX ORDER — SODIUM CHLORIDE 0.9 % (FLUSH) 0.9 %
5-40 SYRINGE (ML) INJECTION EVERY 12 HOURS SCHEDULED
Status: DISCONTINUED | OUTPATIENT
Start: 2024-05-12 | End: 2024-05-13 | Stop reason: HOSPADM

## 2024-05-12 RX ORDER — COLCHICINE 0.6 MG/1
0.6 CAPSULE ORAL
Status: COMPLETED | OUTPATIENT
Start: 2024-05-12 | End: 2024-05-12

## 2024-05-12 RX ORDER — IPRATROPIUM BROMIDE AND ALBUTEROL SULFATE 2.5; .5 MG/3ML; MG/3ML
1 SOLUTION RESPIRATORY (INHALATION)
Status: COMPLETED | OUTPATIENT
Start: 2024-05-12 | End: 2024-05-12

## 2024-05-12 RX ORDER — INSULIN LISPRO 100 [IU]/ML
0-8 INJECTION, SOLUTION INTRAVENOUS; SUBCUTANEOUS
Status: DISCONTINUED | OUTPATIENT
Start: 2024-05-12 | End: 2024-05-13 | Stop reason: HOSPADM

## 2024-05-12 RX ORDER — ONDANSETRON 4 MG/1
4 TABLET, ORALLY DISINTEGRATING ORAL EVERY 8 HOURS PRN
Status: DISCONTINUED | OUTPATIENT
Start: 2024-05-12 | End: 2024-05-13 | Stop reason: HOSPADM

## 2024-05-12 RX ORDER — COLCHICINE 0.6 MG/1
0.6 CAPSULE ORAL DAILY
Status: DISCONTINUED | OUTPATIENT
Start: 2024-05-13 | End: 2024-05-13 | Stop reason: HOSPADM

## 2024-05-12 RX ORDER — ONDANSETRON 2 MG/ML
4 INJECTION INTRAMUSCULAR; INTRAVENOUS EVERY 6 HOURS PRN
Status: DISCONTINUED | OUTPATIENT
Start: 2024-05-12 | End: 2024-05-13 | Stop reason: HOSPADM

## 2024-05-12 RX ORDER — SODIUM CHLORIDE 9 MG/ML
INJECTION, SOLUTION INTRAVENOUS PRN
Status: DISCONTINUED | OUTPATIENT
Start: 2024-05-12 | End: 2024-05-13 | Stop reason: HOSPADM

## 2024-05-12 RX ORDER — IPRATROPIUM BROMIDE AND ALBUTEROL SULFATE 2.5; .5 MG/3ML; MG/3ML
1 SOLUTION RESPIRATORY (INHALATION) EVERY 4 HOURS PRN
Status: DISCONTINUED | OUTPATIENT
Start: 2024-05-12 | End: 2024-05-13 | Stop reason: HOSPADM

## 2024-05-12 RX ORDER — FUROSEMIDE 40 MG/1
40 TABLET ORAL 2 TIMES DAILY
Status: DISCONTINUED | OUTPATIENT
Start: 2024-05-12 | End: 2024-05-13 | Stop reason: HOSPADM

## 2024-05-12 RX ORDER — FUROSEMIDE 10 MG/ML
20 INJECTION INTRAMUSCULAR; INTRAVENOUS ONCE
Status: COMPLETED | OUTPATIENT
Start: 2024-05-12 | End: 2024-05-12

## 2024-05-12 RX ORDER — DEXTROSE MONOHYDRATE 100 MG/ML
INJECTION, SOLUTION INTRAVENOUS CONTINUOUS PRN
Status: DISCONTINUED | OUTPATIENT
Start: 2024-05-12 | End: 2024-05-13 | Stop reason: HOSPADM

## 2024-05-12 RX ORDER — INSULIN LISPRO 100 [IU]/ML
0.08 INJECTION, SOLUTION INTRAVENOUS; SUBCUTANEOUS
Status: DISCONTINUED | OUTPATIENT
Start: 2024-05-12 | End: 2024-05-13 | Stop reason: HOSPADM

## 2024-05-12 RX ORDER — INSULIN LISPRO 100 [IU]/ML
0-4 INJECTION, SOLUTION INTRAVENOUS; SUBCUTANEOUS NIGHTLY
Status: DISCONTINUED | OUTPATIENT
Start: 2024-05-12 | End: 2024-05-13 | Stop reason: HOSPADM

## 2024-05-12 RX ORDER — SODIUM CHLORIDE 0.9 % (FLUSH) 0.9 %
5-40 SYRINGE (ML) INJECTION PRN
Status: DISCONTINUED | OUTPATIENT
Start: 2024-05-12 | End: 2024-05-13 | Stop reason: HOSPADM

## 2024-05-12 RX ORDER — ACETAMINOPHEN 650 MG/1
650 SUPPOSITORY RECTAL EVERY 6 HOURS PRN
Status: DISCONTINUED | OUTPATIENT
Start: 2024-05-12 | End: 2024-05-13 | Stop reason: HOSPADM

## 2024-05-12 RX ADMIN — APIXABAN 5 MG: 5 TABLET, FILM COATED ORAL at 21:30

## 2024-05-12 RX ADMIN — CEFTRIAXONE SODIUM 2000 MG: 2 INJECTION, POWDER, FOR SOLUTION INTRAMUSCULAR; INTRAVENOUS at 11:29

## 2024-05-12 RX ADMIN — ACETAMINOPHEN 650 MG: 325 TABLET ORAL at 19:53

## 2024-05-12 RX ADMIN — INSULIN LISPRO 8 UNITS: 100 INJECTION, SOLUTION INTRAVENOUS; SUBCUTANEOUS at 19:52

## 2024-05-12 RX ADMIN — WATER 40 MG: 1 INJECTION INTRAMUSCULAR; INTRAVENOUS; SUBCUTANEOUS at 21:30

## 2024-05-12 RX ADMIN — IPRATROPIUM BROMIDE AND ALBUTEROL SULFATE 1 DOSE: .5; 3 SOLUTION RESPIRATORY (INHALATION) at 11:35

## 2024-05-12 RX ADMIN — WATER 125 MG: 1 INJECTION INTRAMUSCULAR; INTRAVENOUS; SUBCUTANEOUS at 11:25

## 2024-05-12 RX ADMIN — AZITHROMYCIN MONOHYDRATE 500 MG: 500 INJECTION, POWDER, LYOPHILIZED, FOR SOLUTION INTRAVENOUS at 12:04

## 2024-05-12 RX ADMIN — WATER 1000 MG: 1 INJECTION INTRAMUSCULAR; INTRAVENOUS; SUBCUTANEOUS at 19:52

## 2024-05-12 RX ADMIN — INSULIN LISPRO 12 UNITS: 100 INJECTION, SOLUTION INTRAVENOUS; SUBCUTANEOUS at 19:52

## 2024-05-12 RX ADMIN — FUROSEMIDE 20 MG: 10 INJECTION, SOLUTION INTRAMUSCULAR; INTRAVENOUS at 14:41

## 2024-05-12 RX ADMIN — COLCHICINE 0.6 MG: 0.6 CAPSULE ORAL at 14:41

## 2024-05-12 RX ADMIN — AZITHROMYCIN MONOHYDRATE 500 MG: 500 INJECTION, POWDER, LYOPHILIZED, FOR SOLUTION INTRAVENOUS at 19:53

## 2024-05-12 RX ADMIN — IPRATROPIUM BROMIDE AND ALBUTEROL SULFATE 1 DOSE: .5; 3 SOLUTION RESPIRATORY (INHALATION) at 11:24

## 2024-05-12 RX ADMIN — SODIUM CHLORIDE, PRESERVATIVE FREE 10 ML: 5 INJECTION INTRAVENOUS at 20:06

## 2024-05-12 ASSESSMENT — PAIN DESCRIPTION - ORIENTATION
ORIENTATION: RIGHT

## 2024-05-12 ASSESSMENT — PAIN - FUNCTIONAL ASSESSMENT
PAIN_FUNCTIONAL_ASSESSMENT: ACTIVITIES ARE NOT PREVENTED
PAIN_FUNCTIONAL_ASSESSMENT: 0-10

## 2024-05-12 ASSESSMENT — PAIN SCALES - GENERAL
PAINLEVEL_OUTOF10: 3
PAINLEVEL_OUTOF10: 8
PAINLEVEL_OUTOF10: 8
PAINLEVEL_OUTOF10: 3
PAINLEVEL_OUTOF10: 9

## 2024-05-12 ASSESSMENT — PAIN DESCRIPTION - DESCRIPTORS
DESCRIPTORS: ACHING
DESCRIPTORS: ACHING

## 2024-05-12 ASSESSMENT — PAIN DESCRIPTION - LOCATION
LOCATION: HAND
LOCATION: HAND
LOCATION: ARM
LOCATION: HAND

## 2024-05-12 ASSESSMENT — PAIN DESCRIPTION - ONSET
ONSET: GRADUAL
ONSET: GRADUAL

## 2024-05-12 ASSESSMENT — PAIN DESCRIPTION - PAIN TYPE
TYPE: ACUTE PAIN
TYPE: ACUTE PAIN

## 2024-05-12 ASSESSMENT — PAIN DESCRIPTION - FREQUENCY
FREQUENCY: CONTINUOUS
FREQUENCY: CONTINUOUS

## 2024-05-12 NOTE — H&P
History and Physical    Subjective:     Jasmin Pacheco is a 78 y.o.   female who  has a past medical history of CHF (congestive heart failure) (HCC), Diabetes (HCC), Hyperlipemia, PE on eliquis and Sleep apnea who presented to the emergency department with a 1 week history of a hard time breathing that progressively became worse over the last 2 days. States shortness of breath worse with activity and rest resolved it but the last 2 days it was taking her longer to return to baseline after resting. She states that she has been experience more fatigue. Denies chest pain, nausea, vomiting, fevers, chills. Endorses a productive cough. Pt also c/o right upper extremity pain with some swelling. Denies any injury to hand. In the ED  the receive 2 duonebs, solumedrol, IV lasix colchicine, ceftriaxone, azithromycin. CO2 40, Glu 139, Uric acid 12.6, Lactic acid 2.8-->2.4, hematocrit 45.5, CXR Mild cardiac contour enlargement again demonstrated. Nonspecific bibasilar patchy pulmonary densities, right hand xray Osteopenia and pattern of osteoarthrosis without fracture or dislocation, d-dimer 3.67, B/Cr 22/1.56, , UA + nitrite, blood and LE. Chest CT was attempted prior to arrival to unit. Due to patient inability to tolerate, it was cancelled. Discussed case with ED provider, hospital medicine will admit the patient to medicine acute for further evaluation and treatment COPD exacerbation and suspected pneumonia. Patient assessed at the bedside, patient is alert and oriented, there is no acute distress noted. No increased work of breathing, speaking complete sentences.    Past Medical History:   Diagnosis Date    CHF (congestive heart failure) (HCC)     Diabetes (HCC)     Hyperlipemia     Sleep apnea       Past Surgical History:   Procedure Laterality Date    CATARACT REMOVAL Bilateral     TOTAL HIP ARTHROPLASTY Bilateral     TOTAL KNEE ARTHROPLASTY Bilateral      No family history on file.   Social

## 2024-05-12 NOTE — ED TRIAGE NOTES
Pt reports swollen right arm since yesterday but it started hurting Monday, pt reports shortness of breath as well, states she is always short of breath but it got worse today. Pt arrived to the ED without any O2 on, pt is supposed to wear O2 at 2lpm via NC at all times.

## 2024-05-12 NOTE — ED NOTES
Pt to CT scan, pt was unable to lay flat for scan d/t shortness of breath. Pt returned from CT w/o scan being performed. MD notified.

## 2024-05-12 NOTE — ED NOTES
TRANSFER - OUT REPORT:    Verbal report given to Rae Pacheco  being transferred to , Rm: [phone] for routine progression of patient care       Report consisted of patient's Situation, Background, Assessment and   Recommendations(SBAR).     Information from the following report(s) Nurse Handoff Report, ED Encounter Summary, ED SBAR, MAR, and Recent Results was reviewed with the receiving nurse.    [place] Fall Assessment:    Presents to emergency department  because of falls (Syncope, seizure, or loss of consciousness): No  Age > 70: Yes  Altered Mental Status, Intoxication with alcohol or substance confusion (Disorientation, impaired judgment, poor safety awaremess, or inability to follow instructions): No  Impaired Mobility: Ambulates or transfers with assistive devices or assistance; Unable to ambulate or transer.: Yes             Lines:   Peripheral IV 05/12/24 Left;Proximal;Anterior Forearm (Active)        Opportunity for questions and clarification was provided.      Patient transported with:  Monitor, O2 @ 3lpm, and Registered Nurse

## 2024-05-12 NOTE — PROGRESS NOTES
1530 - Received report from VINAYAK Antoine in the ER. Patient to be admitted to the floor for COPD and CHF exacerbation. Patient is currently on 3L via NC.     1545 - Received patient from ER nurse. Transferred patient from stretcher to bed with back board. Patient has decreased mobility due to size and increased SOB with exertion and when laying flat. Upon assessment patient has very dry skin to BLE but intact. Some redness noted under abdominal folds as well as moisture. Bekah OCHOA notified and ordering powder. Lungs clear in the upper lobes and diminished in the bases. +1 edema to BLE and edema to the right hand and arm +2. No complaints of pain other than some soreness in the right hand.     Respiratory into see patient. Stated goal is to keep O2 between 88-92% due to COPD and CO2 retainer.     Bekah OCHOA rounded on patient.     1700 - Patient eating dinner. No signs of choking or difficulty with eating and drinking.     Bedside shift change report given to VINAYAK Stallworth (oncoming nurse) by BEENA Valdivia RN (offgoing nurse). Report included the following information Nurse Handoff Report, MAR, and Recent Results.       ';

## 2024-05-12 NOTE — ED PROVIDER NOTES
effort when speaking.   HENT:      Head: Normocephalic and atraumatic.      Ears:      Comments: No blood or fluid from ears or nose     Nose:      Comments: No blood or fluid from ears or nose     Mouth/Throat:      Mouth: Mucous membranes are moist.   Eyes:      Extraocular Movements: Extraocular movements intact.   Cardiovascular:      Rate and Rhythm: Normal rate and regular rhythm.      Pulses: Normal pulses.      Heart sounds: Heart sounds are distant.   Pulmonary:      Effort: Pulmonary effort is normal.   Abdominal:      Palpations: Abdomen is soft.      Tenderness: There is no abdominal tenderness.   Musculoskeletal:         General: No deformity.      Cervical back: No rigidity.   Skin:     General: Skin is warm and dry.      Capillary Refill: Capillary refill takes less than 2 seconds.   Neurological:      General: No focal deficit present.      Mental Status: She is oriented to person, place, and time.   Psychiatric:         Mood and Affect: Mood normal.         Behavior: Behavior normal.     Diagnostic Study Results     Labs -  Recent Results (from the past 24 hour(s))   CBC with Auto Differential    Collection Time: 05/12/24 10:10 AM   Result Value Ref Range    WBC 8.0 4.6 - 13.2 K/uL    RBC 4.82 4.20 - 5.30 M/uL    Hemoglobin 14.0 12.0 - 16.0 g/dL    Hematocrit 45.5 (H) 35.0 - 45.0 %    MCV 94.4 78.0 - 100.0 FL    MCH 29.0 24.0 - 34.0 PG    MCHC 30.8 (L) 31.0 - 37.0 g/dL    RDW 16.0 (H) 11.6 - 14.5 %    Platelets 273 135 - 420 K/uL    MPV 10.0 9.2 - 11.8 FL    Nucleated RBCs 0.4 (H) 0.0  WBC    nRBC 0.03 (H) 0.00 - 0.01 K/uL    Neutrophils % 72 40 - 73 %    Lymphocytes % 19 (L) 21 - 52 %    Monocytes % 7 3 - 10 %    Eosinophils % 2 0 - 5 %    Basophils % 0 0 - 2 %    Immature Granulocytes % 0 0 - 0.5 %    Neutrophils Absolute 5.7 1.8 - 8.0 K/UL    Lymphocytes Absolute 1.5 0.9 - 3.6 K/UL    Monocytes Absolute 0.6 0.05 - 1.2 K/UL    Eosinophils Absolute 0.1 0.0 - 0.4 K/UL    Basophils Absolute  Not on file   Social Connections: Not on file   Intimate Partner Violence: Not on file   Depression: Not on file   Housing Stability: Not on file   Interpersonal Safety: Not At Risk (5/12/2024)    Interpersonal Safety Domain Source: IP Abuse Screening     Physical abuse: Denies     Verbal abuse: Denies     Emotional abuse: Denies     Financial abuse: Denies     Sexual abuse: Denies   Utilities: Not on file         Procedures:  Procedures    ED Course:   10:22 AM EDT: Initial assessment performed. The patients presenting problems have been discussed, and they are in agreement with the care plan formulated and outlined with them.  I have encouraged them to ask questions as they arise throughout their visit.    CONSULT NOTE:   12:12 PM  Ralph Ortega MD   spoke with ROXY Godfrey   Specialty: Hospitalist midlevel  Discussed pt's hx, disposition, and available diagnostic and imaging results  over the telephone. Reviewed care plans. Consulting physician agrees with plans as outlined.  Agrees with indications for admission we will continue care on the floor..             Diagnosis and Disposition     Critical Care Time: 65 minute  CRITICAL CARE NOTE:  12:15 PM  I have spent 65 minutes of critical care time involved in lab review, consultations with specialist, family decision-making, and documentation, not including separate billable procedures.  During this entire length of time I was immediately available to the patient.    Critical Care:  The reason for providing this level of medical care for this critically ill patient was due a critical illness that impaired one or more vital organ systems such that there was a high probability of imminent or life threatening deterioration in the patients condition. This care involved high complexity decision making to assess, manipulate, and support vital system functions, to treat this degreee vital organ system failure and to prevent further life threatening

## 2024-05-12 NOTE — PROGRESS NOTES
HHN TX with unit dose duoneb given x 2.  Slight increase in aeration noted.  Patient remains 100% on NC 3 lpm.

## 2024-05-13 ENCOUNTER — APPOINTMENT (OUTPATIENT)
Age: 79
DRG: 191 | End: 2024-05-13
Payer: MEDICARE

## 2024-05-13 ENCOUNTER — CLINICAL DOCUMENTATION (OUTPATIENT)
Age: 79
End: 2024-05-13

## 2024-05-13 VITALS
RESPIRATION RATE: 20 BRPM | HEART RATE: 90 BPM | WEIGHT: 293 LBS | SYSTOLIC BLOOD PRESSURE: 118 MMHG | OXYGEN SATURATION: 97 % | TEMPERATURE: 96.8 F | BODY MASS INDEX: 47.09 KG/M2 | DIASTOLIC BLOOD PRESSURE: 65 MMHG | HEIGHT: 66 IN

## 2024-05-13 LAB
ANION GAP SERPL CALC-SCNC: 2 MMOL/L (ref 3–18)
BASOPHILS # BLD: 0 K/UL (ref 0–0.1)
BASOPHILS NFR BLD: 0 % (ref 0–2)
BUN SERPL-MCNC: 26 MG/DL (ref 7–18)
BUN/CREAT SERPL: 16 (ref 12–20)
CA-I BLD-MCNC: 8.7 MG/DL (ref 8.5–10.1)
CHLORIDE SERPL-SCNC: 94 MMOL/L (ref 100–111)
CO2 SERPL-SCNC: 42 MMOL/L (ref 21–32)
CREAT SERPL-MCNC: 1.62 MG/DL (ref 0.6–1.3)
DIFFERENTIAL METHOD BLD: ABNORMAL
EKG ATRIAL RATE: 88 BPM
EKG DIAGNOSIS: NORMAL
EKG P AXIS: 30 DEGREES
EKG P-R INTERVAL: 180 MS
EKG Q-T INTERVAL: 408 MS
EKG QRS DURATION: 94 MS
EKG QTC CALCULATION (BAZETT): 493 MS
EKG R AXIS: 3 DEGREES
EKG T AXIS: 74 DEGREES
EKG VENTRICULAR RATE: 88 BPM
EOSINOPHIL # BLD: 0 K/UL (ref 0–0.4)
EOSINOPHIL NFR BLD: 0 % (ref 0–5)
ERYTHROCYTE [DISTWIDTH] IN BLOOD BY AUTOMATED COUNT: 15.6 % (ref 11.6–14.5)
GLUCOSE BLD STRIP.AUTO-MCNC: 141 MG/DL (ref 70–110)
GLUCOSE BLD STRIP.AUTO-MCNC: 193 MG/DL (ref 70–110)
GLUCOSE SERPL-MCNC: 205 MG/DL (ref 74–99)
HCT VFR BLD AUTO: 42.2 % (ref 35–45)
HGB BLD-MCNC: 13 G/DL (ref 12–16)
IMM GRANULOCYTES # BLD AUTO: 0 K/UL
IMM GRANULOCYTES NFR BLD AUTO: 0 %
LACTATE SERPL-SCNC: 1.9 MMOL/L (ref 0.4–2)
LACTATE SERPL-SCNC: 2.4 MMOL/L (ref 0.4–2)
LYMPHOCYTES # BLD: 0.4 K/UL (ref 0.9–3.6)
LYMPHOCYTES NFR BLD: 4 % (ref 21–52)
MCH RBC QN AUTO: 28.6 PG (ref 24–34)
MCHC RBC AUTO-ENTMCNC: 30.8 G/DL (ref 31–37)
MCV RBC AUTO: 93 FL (ref 78–100)
MONOCYTES # BLD: 0.3 K/UL (ref 0.05–1.2)
MONOCYTES NFR BLD: 3 % (ref 3–10)
NEUTS BAND NFR BLD MANUAL: 4 % (ref 0–5)
NEUTS SEG # BLD: 10.3 K/UL (ref 1.8–8)
NEUTS SEG NFR BLD: 89 % (ref 40–73)
NRBC # BLD: 0.02 K/UL (ref 0–0.01)
NRBC BLD-RTO: 0.2 PER 100 WBC
PERFORMED BY:: ABNORMAL
PERFORMED BY:: ABNORMAL
PHOSPHATE SERPL-MCNC: 2.7 MG/DL (ref 2.5–4.9)
PLATELET # BLD AUTO: 237 K/UL (ref 135–420)
PMV BLD AUTO: 9.5 FL (ref 9.2–11.8)
POTASSIUM SERPL-SCNC: 4.4 MMOL/L (ref 3.5–5.5)
RBC # BLD AUTO: 4.54 M/UL (ref 4.2–5.3)
RBC MORPH BLD: ABNORMAL
SODIUM SERPL-SCNC: 138 MMOL/L (ref 136–145)
WBC # BLD AUTO: 11 K/UL (ref 4.6–13.2)
WBC MORPH BLD: ABNORMAL

## 2024-05-13 PROCEDURE — 6370000000 HC RX 637 (ALT 250 FOR IP): Performed by: NURSE PRACTITIONER

## 2024-05-13 PROCEDURE — 94761 N-INVAS EAR/PLS OXIMETRY MLT: CPT

## 2024-05-13 PROCEDURE — 80048 BASIC METABOLIC PNL TOTAL CA: CPT

## 2024-05-13 PROCEDURE — 2580000003 HC RX 258: Performed by: NURSE PRACTITIONER

## 2024-05-13 PROCEDURE — 82962 GLUCOSE BLOOD TEST: CPT

## 2024-05-13 PROCEDURE — 83605 ASSAY OF LACTIC ACID: CPT

## 2024-05-13 PROCEDURE — 85025 COMPLETE CBC W/AUTO DIFF WBC: CPT

## 2024-05-13 PROCEDURE — 2500000003 HC RX 250 WO HCPCS: Performed by: NURSE PRACTITIONER

## 2024-05-13 PROCEDURE — 2700000000 HC OXYGEN THERAPY PER DAY

## 2024-05-13 PROCEDURE — 36415 COLL VENOUS BLD VENIPUNCTURE: CPT

## 2024-05-13 PROCEDURE — 6360000002 HC RX W HCPCS: Performed by: NURSE PRACTITIONER

## 2024-05-13 PROCEDURE — 84100 ASSAY OF PHOSPHORUS: CPT

## 2024-05-13 RX ORDER — AMOXICILLIN AND CLAVULANATE POTASSIUM 875; 125 MG/1; MG/1
1 TABLET, FILM COATED ORAL 2 TIMES DAILY
Qty: 14 TABLET | Refills: 0 | Status: SHIPPED | OUTPATIENT
Start: 2024-05-13 | End: 2024-05-20

## 2024-05-13 RX ORDER — PREDNISONE 10 MG/1
30 TABLET ORAL DAILY
Qty: 15 TABLET | Refills: 0 | Status: SHIPPED | OUTPATIENT
Start: 2024-05-13 | End: 2024-05-18

## 2024-05-13 RX ADMIN — ANTI-FUNGAL POWDER MICONAZOLE NITRATE TALC FREE: 1.42 POWDER TOPICAL at 13:40

## 2024-05-13 RX ADMIN — SODIUM CHLORIDE, PRESERVATIVE FREE 10 ML: 5 INJECTION INTRAVENOUS at 08:20

## 2024-05-13 RX ADMIN — INSULIN LISPRO 12 UNITS: 100 INJECTION, SOLUTION INTRAVENOUS; SUBCUTANEOUS at 08:19

## 2024-05-13 RX ADMIN — INSULIN LISPRO 12 UNITS: 100 INJECTION, SOLUTION INTRAVENOUS; SUBCUTANEOUS at 13:29

## 2024-05-13 RX ADMIN — AZITHROMYCIN MONOHYDRATE 500 MG: 500 INJECTION, POWDER, LYOPHILIZED, FOR SOLUTION INTRAVENOUS at 13:31

## 2024-05-13 RX ADMIN — WATER 40 MG: 1 INJECTION INTRAMUSCULAR; INTRAVENOUS; SUBCUTANEOUS at 08:20

## 2024-05-13 RX ADMIN — APIXABAN 5 MG: 5 TABLET, FILM COATED ORAL at 08:30

## 2024-05-13 RX ADMIN — WATER 40 MG: 1 INJECTION INTRAMUSCULAR; INTRAVENOUS; SUBCUTANEOUS at 04:00

## 2024-05-13 RX ADMIN — COLCHICINE 0.6 MG: 0.6 CAPSULE ORAL at 08:20

## 2024-05-13 ASSESSMENT — PAIN SCALES - GENERAL
PAINLEVEL_OUTOF10: 0
PAINLEVEL_OUTOF10: 0

## 2024-05-13 NOTE — DISCHARGE SUMMARY
Discharge Summary       PATIENT ID: Jasmin Pacheco  MRN: [id number]   YOB: 1945    DATE OF ADMISSION: 5/12/2024  9:58 AM    DATE OF DISCHARGE: 05/13/24    PRIMARY CARE PROVIDER: Edgard Harrington MD     ATTENDING PHYSICIAN: Tyler Winston MD  DISCHARGING PROVIDER: Tyler Winston MD        CONSULTATIONS: IP CONSULT TO HOSPITALIST  IP CONSULT TO SPIRITUAL SERVICES    PROCEDURES/SURGERIES: * No surgery found *    Admission Diagnoses:   COPD exacerbation (HCC) [J44.1]    Discharge Medications     Medication List        START taking these medications      amoxicillin-clavulanate 875-125 MG per tablet  Commonly known as: AUGMENTIN  Take 1 tablet by mouth 2 times daily for 7 days     predniSONE 10 MG tablet  Commonly known as: DELTASONE  Take 3 tablets by mouth daily for 5 days            CONTINUE taking these medications      albuterol sulfate  (90 Base) MCG/ACT inhaler  Commonly known as: PROVENTIL;VENTOLIN;PROAIR     apixaban 5 MG Tabs tablet  Commonly known as: Eliquis  Take 1 tablet by mouth 2 times daily     atorvastatin 20 MG tablet  Commonly known as: LIPITOR     baclofen 10 MG tablet  Commonly known as: LIORESAL     cyclobenzaprine 5 MG tablet  Commonly known as: FLEXERIL     furosemide 40 MG tablet  Commonly known as: LASIX  Take 1 tablet by mouth 2 times daily     glipiZIDE 5 MG extended release tablet  Commonly known as: GLUCOTROL XL     nystatin [id number] UNIT/GM powder  Commonly known as: MYCOSTATIN     triamcinolone 0.1 % cream  Commonly known as: KENALOG               Where to Get Your Medications        These medications were sent to [facility] [address] [place] [address] [phone] -  [phone]  [address] [place] [id number]      Phone: [phone]   amoxicillin-clavulanate 875-125 MG per tablet  predniSONE 10 MG tablet         HPI on Admission (per admitting physician):   Jasmin Pacheco is a 78 y.o.   female who  has a past

## 2024-05-13 NOTE — CARE COORDINATION
05/13/24 1108   Service Assessment   Patient Orientation Alert and Oriented   Cognition Alert   History Provided By Patient   Primary Caregiver Self   Accompanied By/Relationship Alone in room   Support Systems Family Members   Patient's Healthcare Decision Maker is: Legal Next of Kin   PCP Verified by CM Yes   Last Visit to PCP Within last 6 months   Prior Functional Level Independent in ADLs/IADLs   Current Functional Level Independent in ADLs/IADLs   Can patient return to prior living arrangement Yes   Ability to make needs known: Good   Family able to assist with home care needs: Yes   Would you like for me to discuss the discharge plan with any other family members/significant others, and if so, who? No   Financial Resources Medicaid;Medicare   Community Resources Other (Comment)   CM/SW Referral Other (see comment)     Patient lives at home, twin sons live with pt.  Patient has home 02, cpap and rollator but needs bariatric rollator, this was ordered in [place] for home delivery.   Has not driven in a year, sons drive to appointments and will transport home when ready for DC.  CM following for DC needs.

## 2024-05-13 NOTE — PLAN OF CARE
Problem: Discharge Planning  Goal: Discharge to home or other facility with appropriate resources  5/13/2024 1113 by Sandra Smith RN  Outcome: Completed  5/13/2024 0906 by Sandra Smith RN  Outcome: Progressing     Problem: Pain  Goal: Verbalizes/displays adequate comfort level or baseline comfort level  5/13/2024 1113 by Sandra Smith RN  Outcome: Completed  5/13/2024 0906 by Sandra Smith RN  Outcome: Progressing     Problem: Skin/Tissue Integrity  Goal: Absence of new skin breakdown  Description: 1.  Monitor for areas of redness and/or skin breakdown  2.  Assess vascular access sites hourly  3.  Every 4-6 hours minimum:  Change oxygen saturation probe site  4.  Every 4-6 hours:  If on nasal continuous positive airway pressure, respiratory therapy assess nares and determine need for appliance change or resting period.  5/13/2024 1113 by Sandra Smith RN  Outcome: Completed  5/13/2024 0906 by Sandra Smith RN  Outcome: Progressing     Problem: Safety - Adult  Goal: Free from fall injury  5/13/2024 1113 by Sandra Smith RN  Outcome: Completed  5/13/2024 0906 by Sandra Smith RN  Outcome: Progressing     Problem: ABCDS Injury Assessment  Goal: Absence of physical injury  5/13/2024 1113 by Sandra Smith RN  Outcome: Completed  5/13/2024 0906 by Sandra Smith RN  Outcome: Progressing     Problem: Chronic Conditions and Co-morbidities  Goal: Patient's chronic conditions and co-morbidity symptoms are monitored and maintained or improved  5/13/2024 1113 by Sandra Smith RN  Outcome: Completed  5/13/2024 0906 by Sandra Smith RN  Outcome: Progressing

## 2024-05-13 NOTE — PROGRESS NOTES
0700 - Care assumed.  Patient resting quietly with CPAP in place.  CBWR.    0830 - Scheduled medications given.  Assessment, vs complete. Patient placed on nasal cannula.  Given breakfast tray and fresh water.    1030 - Patient awake resting quietly in bed, family present.  Advised of CT scan scheduled for today and to delay lunch until scan completed.    1100 - Patient off unit for testing, MRI.    1230 - Patient returned to unit.  Unable to perform MRI.  Provider informed.    1300 - Patient eating lunch, family present, awaiting clothing from son.    1500 - IV ABT completed, patient dressing for discharge.    1530 - AVS reviewed with patient, awaiting family transportation.    1640 - Family supplied oxygen tank, patient transported to vehicle via wheelchair.

## 2024-05-13 NOTE — PLAN OF CARE
Problem: Pain  Goal: Verbalizes/displays adequate comfort level or baseline comfort level  5/12/2024 2106 by Federica Montiel, RN  Outcome: Progressing  5/12/2024 1643 by Sheila Valdivia RN  Outcome: Progressing     Problem: Skin/Tissue Integrity  Goal: Absence of new skin breakdown  Description: 1.  Monitor for areas of redness and/or skin breakdown  2.  Assess vascular access sites hourly  3.  Every 4-6 hours minimum:  Change oxygen saturation probe site  4.  Every 4-6 hours:  If on nasal continuous positive airway pressure, respiratory therapy assess nares and determine need for appliance change or resting period.  5/12/2024 2106 by Federica Montiel, RN  Outcome: Progressing  5/12/2024 1643 by Sheila Valdivia RN  Outcome: Progressing     Problem: Safety - Adult  Goal: Free from fall injury  5/12/2024 2106 by Federica Montiel, RN  Outcome: Progressing  5/12/2024 1643 by Sheila Valdivia RN  Outcome: Progressing

## 2024-05-13 NOTE — PROGRESS NOTES
conducted an initial consultation and Spiritual Assessment for Jasmin Pacehco, who is a 78 y.o.,female. Patient’s Primary Language is: English.   According to the patient’s EMR Adventism Affiliation is: Jain.     The reason the Patient came to the hospital is:   Patient Active Problem List    Diagnosis Date Noted    COPD exacerbation (HCC) 05/12/2024    Shortness of breath 08/03/2023    Acute respiratory failure (HCC) 05/25/2023    Acute on chronic diastolic heart failure (HCC) 05/25/2023    Hyperlipidemia 05/25/2023    Diabetes mellitus type 2 in obese 05/25/2023    Acute cystitis 05/25/2023        The  provided the following Interventions:  Initiated a relationship of care and support.   Explored issues of nila, belief, spirituality and Anglican/ritual needs while hospitalized.  Listened empathically.  Provided chaplaincy education.  Provided information about Spiritual Care Services.  Offered prayer and assurance of continued prayers on patient's behalf.   Chart reviewed.    The following outcomes where achieved:  Patient shared limited information about both their medical narrative and spiritual journey/beliefs.  Patient expressed gratitude for 's visit.    Assessment:  Patient does not have any Anglican/cultural needs that will affect patient’s preferences in health care.  There are no spiritual or Anglican issues which require intervention at this time.     Plan:  Chaplains will continue to follow and will provide pastoral care on an as needed/requested basis.   recommends bedside caregivers page  on duty if patient shows signs of acute spiritual or emotional distress.         Renata Collins  Spiritual Care   [phone]

## 2024-05-13 NOTE — PLAN OF CARE
Problem: Discharge Planning  Goal: Discharge to home or other facility with appropriate resources  Outcome: Progressing     Problem: Pain  Goal: Verbalizes/displays adequate comfort level or baseline comfort level  5/13/2024 0906 by Sandra Smith RN  Outcome: Progressing  5/12/2024 2106 by Federica Montiel RN  Outcome: Progressing     Problem: Skin/Tissue Integrity  Goal: Absence of new skin breakdown  Description: 1.  Monitor for areas of redness and/or skin breakdown  2.  Assess vascular access sites hourly  3.  Every 4-6 hours minimum:  Change oxygen saturation probe site  4.  Every 4-6 hours:  If on nasal continuous positive airway pressure, respiratory therapy assess nares and determine need for appliance change or resting period.  5/13/2024 0906 by Sandra Smith RN  Outcome: Progressing  5/12/2024 2106 by Federica Montiel RN  Outcome: Progressing     Problem: Safety - Adult  Goal: Free from fall injury  5/13/2024 0906 by Sandra Smith RN  Outcome: Progressing  5/12/2024 2106 by Federica Montiel RN  Outcome: Progressing     Problem: ABCDS Injury Assessment  Goal: Absence of physical injury  Outcome: Progressing

## 2024-05-13 NOTE — PROGRESS NOTES
Advance Care Planning     Advance Care Planning Inpatient Note  [facility] Department    Today's Date: 5/13/2024  Unit: F 2 SOUTH    Received request from HealthCare Provider.  Upon review of chart and communication with care team, patient's decision making abilities are not in question.. Patient and Child/Children was/were present in the room during visit.    Goals of ACP Conversation:  Discuss advance care planning documents    Health Care Decision Makers:     No healthcare decision makers have been documented.  Click here to complete HealthCare Decision Makers including selection of the Healthcare Decision Maker Relationship (ie \"Primary\")  Summary:  No Decision Maker named by patient at this time    Advance Care Planning Documents (Patient Wishes):  None     Assessment:     05/13/24 1430   Encounter Summary   Encounter Overview/Reason Initial Encounter;Advance Care Planning   Service Provided For Patient;Family   Referral/Consult From Nurse   Support System Family members   Last Encounter  05/13/24  (IV-SA-AMD Consult - [facility])   Complexity of Encounter Moderate   Begin Time 1342   End Time  1353   Total Time Calculated 11 min   Advance Care Planning   Type ACP conversation   Assessment/Intervention/Outcome   Assessment Coping   Intervention Active listening;Discussed relationship with God;Discussed meaning/purpose;Sustaining Presence/Ministry of presence   Outcome Expressed Gratitude;Receptive         Interventions:  Reviewed but did not complete ACP document    Care Preferences Communicated:   No    Outcomes/Plan:  ACP Discussion: Postponed    Electronically signed by PANCHITO Cuellar on 5/13/2024 at 2:33 PM

## 2024-05-13 NOTE — PROGRESS NOTES
Patient was unable to tolerate CT scan.  The scanner kept aborting the scan and the gantry could not initialize due to patient's size.  The patient was not able to have her arms in the correct position for the scan also.

## 2024-05-17 LAB
BACTERIA ISLT: ABNORMAL
BACTERIA SPEC CULT: ABNORMAL
COLONY COUNT, CNT: ABNORMAL
Lab: ABNORMAL
SPECIMEN SOURCE: ABNORMAL

## 2024-05-17 NOTE — PROGRESS NOTES
Physician Progress Note      PATIENT:               SAMEER MCALLISTER  [facility] #:                  [id number]  :                       1945  ADMIT DATE:       2024 9:58 AM  DISCH DATE:        2024 4:40 PM  RESPONDING  PROVIDER #:        Chacho Hawley MD          QUERY TEXT:    Good Afternoon    This patient admitted on 2024- COPD exacerbation.    The discharge summary notes \"CKD\"    If possible, can you please further specify the stage of the CKD and please   document in progress notes and discharge summary if you are evaluating and/or   treating any of the following:      The medical record reflects the following:  Risk Factors: DM, HTN  Clinical Indicators: H&P notes \"baseline creatinine @ 1.5---Discharge summary   notes ' CKD at baseline\". Creatinine this admission 1.5-1.62--- (GFR 33-34)  Treatment: Strict I&O, Avoid nephrotoxic agents/Contrast, No NSAIDS, No   ACDI/ARBS, Avoid drastic lowering blood pressure.      Thank you  POORNIMA Carroll,RN, CPHQ, CCDS, SMART  Options provided:  -- CKD Stage 1 GFR>90  -- CKD Stage 2 GFR 60-90  -- CKD Stage 3a GFR 45-59  -- Other - I will add my own diagnosis  -- Disagree - Not applicable / Not valid  -- Disagree - Clinically unable to determine / Unknown  -- Refer to Clinical Documentation Reviewer    PROVIDER RESPONSE TEXT:    CKD Stage 3b    Query created by: January Bob on 2024 9:44 AM      QUERY TEXT:    Good Afternoon    This patient admitted for COPD exacerbation.    The medical record notes BMI of 52.62-    If possible, please document in progress notes and discharge summary if you   are evaluating and /or treating any of the following:    The medical record reflects the following:  Risk Factors: DM , CHF, COPD  Clinical Indicators: BMI of 52.62-  Treatment: Weight on admission, Diet, 3 carb choice, low Fat/low cholesterol,   Use the left equipment for lifting pt    Thank you  POORNIMA Carroll,RN,CPHQ, CCDS,

## 2024-05-17 NOTE — PROGRESS NOTES
Physician Progress Note      PATIENT:               SAMEER MCALLITSER  CSN #:                  [id number]  :                       1945  ADMIT DATE:       2024 9:58 AM  DISCH DATE:        2024 4:40 PM  RESPONDING  PROVIDER #:        Chacho Hawley MD          QUERY TEXT:    Good Afternoon    This patient admitted for COPD exacerbation.    The discharge summary notes \"Chronic o2 use 2 liters daytime and 1 liters NC @   night\".  \"Copd exacerbation with hypercapnia\".    If possible, please document in the progress notes and discharge summary if   you are evaluating and/or treating any of the following:      The medical record reflects the following:  Risk Factors: COPD, Pneumonia  Clinical Indicators: Discharge summary notes \"Pt with chronic o2 use 2 liters   daytime, and 1 liter night time COPE exacerbation with hypercapnia.  Treatment: o2 monitoring, o2 supplement, Solumedrol, Albuterol PRN    Thank you  FLOR CarrollN,RN, CPHQ, CCDS, SMART  Options provided:  -- Chronic respiratory failure with hypoxia  -- Chronic respiratory failure with hypercapnia  -- Chronic respiratory failure with hypoxia and hypercapnia  -- Other - I will add my own diagnosis  -- Disagree - Not applicable / Not valid  -- Disagree - Clinically unable to determine / Unknown  -- Refer to Clinical Documentation Reviewer    PROVIDER RESPONSE TEXT:    This patient has chronic respiratory failure with hypoxia and hypercapnia.    Query created by: January Bob on 2024 1:17 PM      QUERY TEXT:    Good Afternoon    This patient admitted for COPD exacerbation    The discharge summary notes \"Pneumonia, RIght sided Lobar (CAP) suspected.\"  PT was treated with IV ceftriaxone and azithromycin and will be discharged on   Augmentin      Note: CAP and HCAP indicate where the pneumonia was acquired, not a specific   type.      If possible, could you please further specify the type of Pneumonia you are   treating that supports the clinical

## 2024-05-18 LAB
BACTERIA SPEC CULT: NORMAL
BACTERIA SPEC CULT: NORMAL
Lab: NORMAL
Lab: NORMAL

## 2024-05-22 LAB
BACTERIA ISLT: ABNORMAL
OTHER ANTIBIOTIC SUSC ISLT: ABNORMAL
OTHER ANTIBIOTIC SUSC ISLT: ABNORMAL
SPECIMEN SOURCE: ABNORMAL
SPECIMEN SOURCE: ABNORMAL

## 2024-07-19 LAB
BACTERIA ISLT: ABNORMAL
OTHER ANTIBIOTIC SUSC ISLT: ABNORMAL
SPECIMEN SOURCE: ABNORMAL

## 2024-07-22 LAB
BACTERIA ISLT: ABNORMAL
OTHER ANTIBIOTIC SUSC ISLT: ABNORMAL
SPECIMEN SOURCE: ABNORMAL

## 2024-07-30 ENCOUNTER — HOSPITAL ENCOUNTER (INPATIENT)
Age: 79
LOS: 5 days | Discharge: ANOTHER HOSPITAL | DRG: 177 | End: 2024-08-05
Attending: EMERGENCY MEDICINE | Admitting: INTERNAL MEDICINE
Payer: MEDICARE

## 2024-07-30 DIAGNOSIS — I50.33 ACUTE ON CHRONIC DIASTOLIC CHF (CONGESTIVE HEART FAILURE) (HCC): ICD-10-CM

## 2024-07-30 DIAGNOSIS — J96.22 ACUTE ON CHRONIC RESPIRATORY FAILURE WITH HYPOXIA AND HYPERCAPNIA (HCC): Primary | ICD-10-CM

## 2024-07-30 DIAGNOSIS — U07.1 COVID-19: ICD-10-CM

## 2024-07-30 DIAGNOSIS — J96.21 ACUTE ON CHRONIC RESPIRATORY FAILURE WITH HYPOXIA AND HYPERCAPNIA (HCC): Primary | ICD-10-CM

## 2024-07-30 PROCEDURE — 99285 EMERGENCY DEPT VISIT HI MDM: CPT

## 2024-07-31 ENCOUNTER — APPOINTMENT (OUTPATIENT)
Age: 79
DRG: 177 | End: 2024-07-31
Attending: EMERGENCY MEDICINE
Payer: MEDICARE

## 2024-07-31 PROBLEM — J96.22 ACUTE ON CHRONIC RESPIRATORY FAILURE WITH HYPOXIA AND HYPERCAPNIA (HCC): Status: ACTIVE | Noted: 2024-07-31

## 2024-07-31 PROBLEM — J96.21 ACUTE ON CHRONIC RESPIRATORY FAILURE WITH HYPOXIA AND HYPERCAPNIA (HCC): Status: ACTIVE | Noted: 2024-07-31

## 2024-07-31 LAB
ALBUMIN SERPL-MCNC: 2.4 G/DL (ref 3.4–5)
ALBUMIN SERPL-MCNC: 2.6 G/DL (ref 3.4–5)
ALBUMIN/GLOB SERPL: 0.4 (ref 0.8–1.7)
ALBUMIN/GLOB SERPL: 0.5 (ref 0.8–1.7)
ALP SERPL-CCNC: 128 U/L (ref 45–117)
ALP SERPL-CCNC: 142 U/L (ref 45–117)
ALT SERPL-CCNC: 15 U/L (ref 13–56)
ALT SERPL-CCNC: 17 U/L (ref 13–56)
ANION GAP SERPL CALC-SCNC: 3 MMOL/L (ref 3–18)
ANION GAP SERPL CALC-SCNC: 5 MMOL/L (ref 3–18)
APPEARANCE UR: CLEAR
ARTERIAL PATENCY WRIST A: YES
AST SERPL W P-5'-P-CCNC: 15 U/L (ref 10–38)
AST SERPL W P-5'-P-CCNC: 21 U/L (ref 10–38)
BASE EXCESS BLDA CALC-SCNC: 7.9 MMOL/L (ref 0–3)
BASOPHILS # BLD: 0 K/UL (ref 0–0.1)
BASOPHILS # BLD: 0 K/UL (ref 0–0.1)
BASOPHILS NFR BLD: 0 % (ref 0–2)
BASOPHILS NFR BLD: 1 % (ref 0–2)
BDY SITE: ABNORMAL
BILIRUB SERPL-MCNC: 0.3 MG/DL (ref 0.2–1)
BILIRUB SERPL-MCNC: 0.4 MG/DL (ref 0.2–1)
BILIRUB UR QL: NEGATIVE
BNP SERPL-MCNC: 441 PG/ML (ref 0–1800)
BUN SERPL-MCNC: 22 MG/DL (ref 7–18)
BUN SERPL-MCNC: 22 MG/DL (ref 7–18)
BUN/CREAT SERPL: 14 (ref 12–20)
BUN/CREAT SERPL: 14 (ref 12–20)
CA-I BLD-MCNC: 8.2 MG/DL (ref 8.5–10.1)
CA-I BLD-MCNC: 8.4 MG/DL (ref 8.5–10.1)
CHLORIDE SERPL-SCNC: 100 MMOL/L (ref 100–111)
CHLORIDE SERPL-SCNC: 100 MMOL/L (ref 100–111)
CO2 SERPL-SCNC: 34 MMOL/L (ref 21–32)
CO2 SERPL-SCNC: 39 MMOL/L (ref 21–32)
COHGB MFR BLD: 1.7 % (ref 1–2)
COLOR UR: YELLOW
CREAT SERPL-MCNC: 1.52 MG/DL (ref 0.6–1.3)
CREAT SERPL-MCNC: 1.61 MG/DL (ref 0.6–1.3)
CRP SERPL-MCNC: 4.4 MG/DL (ref 0–0.3)
DIFFERENTIAL METHOD BLD: ABNORMAL
DIFFERENTIAL METHOD BLD: ABNORMAL
EKG ATRIAL RATE: 105 BPM
EKG DIAGNOSIS: NORMAL
EKG P AXIS: 39 DEGREES
EKG P-R INTERVAL: 196 MS
EKG Q-T INTERVAL: 358 MS
EKG QRS DURATION: 82 MS
EKG QTC CALCULATION (BAZETT): 473 MS
EKG R AXIS: 2 DEGREES
EKG T AXIS: 63 DEGREES
EKG VENTRICULAR RATE: 105 BPM
EOSINOPHIL # BLD: 0.1 K/UL (ref 0–0.4)
EOSINOPHIL # BLD: 0.2 K/UL (ref 0–0.4)
EOSINOPHIL NFR BLD: 2 % (ref 0–5)
EOSINOPHIL NFR BLD: 2 % (ref 0–5)
ERYTHROCYTE [DISTWIDTH] IN BLOOD BY AUTOMATED COUNT: 15.8 % (ref 11.6–14.5)
ERYTHROCYTE [DISTWIDTH] IN BLOOD BY AUTOMATED COUNT: 15.9 % (ref 11.6–14.5)
FIO2 ON VENT: 95 %
FLUAV RNA SPEC QL NAA+PROBE: NOT DETECTED
FLUBV RNA SPEC QL NAA+PROBE: NOT DETECTED
GAS FLOW.O2 O2 DELIVERY SYS: 15 L/MIN
GLOBULIN SER CALC-MCNC: 5.3 G/DL (ref 2–4)
GLOBULIN SER CALC-MCNC: 5.8 G/DL (ref 2–4)
GLUCOSE BLD STRIP.AUTO-MCNC: 147 MG/DL (ref 70–110)
GLUCOSE BLD STRIP.AUTO-MCNC: 174 MG/DL (ref 70–110)
GLUCOSE BLD STRIP.AUTO-MCNC: 178 MG/DL (ref 70–110)
GLUCOSE BLD STRIP.AUTO-MCNC: 282 MG/DL (ref 70–110)
GLUCOSE SERPL-MCNC: 180 MG/DL (ref 74–99)
GLUCOSE SERPL-MCNC: 266 MG/DL (ref 74–99)
GLUCOSE UR STRIP.AUTO-MCNC: NEGATIVE MG/DL
HCO3 BLDA-SCNC: 39 MMOL/L (ref 22–26)
HCT VFR BLD AUTO: 41 % (ref 35–45)
HCT VFR BLD AUTO: 42.9 % (ref 35–45)
HGB BLD-MCNC: 12.5 G/DL (ref 12–16)
HGB BLD-MCNC: 13 G/DL (ref 12–16)
HGB UR QL STRIP: NEGATIVE
IMM GRANULOCYTES # BLD AUTO: 0 K/UL (ref 0–0.04)
IMM GRANULOCYTES # BLD AUTO: 0 K/UL (ref 0–0.04)
IMM GRANULOCYTES NFR BLD AUTO: 0 % (ref 0–0.5)
IMM GRANULOCYTES NFR BLD AUTO: 0 % (ref 0–0.5)
INR PPP: 1.3 (ref 0.9–1.1)
KETONES UR QL STRIP.AUTO: NEGATIVE MG/DL
LACTATE SERPL-SCNC: 1.8 MMOL/L (ref 0.4–2)
LEUKOCYTE ESTERASE UR QL STRIP.AUTO: NEGATIVE
LYMPHOCYTES # BLD: 1.4 K/UL (ref 0.9–3.6)
LYMPHOCYTES # BLD: 2 K/UL (ref 0.9–3.6)
LYMPHOCYTES NFR BLD: 25 % (ref 21–52)
LYMPHOCYTES NFR BLD: 26 % (ref 21–52)
MAGNESIUM SERPL-MCNC: 2.1 MG/DL (ref 1.6–2.6)
MCH RBC QN AUTO: 28.4 PG (ref 24–34)
MCH RBC QN AUTO: 28.9 PG (ref 24–34)
MCHC RBC AUTO-ENTMCNC: 30.3 G/DL (ref 31–37)
MCHC RBC AUTO-ENTMCNC: 30.5 G/DL (ref 31–37)
MCV RBC AUTO: 93.9 FL (ref 78–100)
MCV RBC AUTO: 94.9 FL (ref 78–100)
METHGB MFR BLD: 0.2 % (ref 0–1.4)
MONOCYTES # BLD: 0.4 K/UL (ref 0.05–1.2)
MONOCYTES # BLD: 0.7 K/UL (ref 0.05–1.2)
MONOCYTES NFR BLD: 7 % (ref 3–10)
MONOCYTES NFR BLD: 9 % (ref 3–10)
NEUTS SEG # BLD: 3.5 K/UL (ref 1.8–8)
NEUTS SEG # BLD: 5.2 K/UL (ref 1.8–8)
NEUTS SEG NFR BLD: 63 % (ref 40–73)
NEUTS SEG NFR BLD: 65 % (ref 40–73)
NITRITE UR QL STRIP.AUTO: NEGATIVE
NRBC # BLD: 0 K/UL (ref 0–0.01)
NRBC # BLD: 0 K/UL (ref 0–0.01)
NRBC BLD-RTO: 0 PER 100 WBC
NRBC BLD-RTO: 0 PER 100 WBC
OXYHGB MFR BLD: 95.3 % (ref 95–99)
PCO2 BLDA: 90 MMHG (ref 35–45)
PERFORMED BY:: ABNORMAL
PH BLDA: 7.25 (ref 7.35–7.45)
PH UR STRIP: 5 (ref 5–8)
PHOSPHATE SERPL-MCNC: 3.9 MG/DL (ref 2.5–4.9)
PLATELET # BLD AUTO: 185 K/UL (ref 135–420)
PLATELET # BLD AUTO: 212 K/UL (ref 135–420)
PMV BLD AUTO: 10.4 FL (ref 9.2–11.8)
PMV BLD AUTO: 9.5 FL (ref 9.2–11.8)
PO2 BLDA: 105 MMHG (ref 80–100)
POTASSIUM SERPL-SCNC: 3.8 MMOL/L (ref 3.5–5.5)
POTASSIUM SERPL-SCNC: 4.4 MMOL/L (ref 3.5–5.5)
PROCALCITONIN SERPL-MCNC: <0.05 NG/ML
PROT SERPL-MCNC: 7.7 G/DL (ref 6.4–8.2)
PROT SERPL-MCNC: 8.4 G/DL (ref 6.4–8.2)
PROT UR STRIP-MCNC: NEGATIVE MG/DL
PROTHROMBIN TIME: 16.8 SEC (ref 11.9–14.9)
RBC # BLD AUTO: 4.32 M/UL (ref 4.2–5.3)
RBC # BLD AUTO: 4.57 M/UL (ref 4.2–5.3)
SAO2 % BLD: 97 % (ref 95–99)
SAO2% DEVICE SAO2% SENSOR NAME: ABNORMAL
SARS-COV-2 RNA RESP QL NAA+PROBE: DETECTED
SERVICE CMNT-IMP: ABNORMAL
SODIUM SERPL-SCNC: 139 MMOL/L (ref 136–145)
SODIUM SERPL-SCNC: 142 MMOL/L (ref 136–145)
SP GR UR REFRACTOMETRY: 1.01 (ref 1–1.03)
SPECIMEN SITE: ABNORMAL
TROPONIN I SERPL HS-MCNC: 15 NG/L (ref 0–54)
TROPONIN I SERPL HS-MCNC: 27 NG/L (ref 0–54)
UROBILINOGEN UR QL STRIP.AUTO: 0.2 EU/DL (ref 0.2–1)
WBC # BLD AUTO: 5.4 K/UL (ref 4.6–13.2)
WBC # BLD AUTO: 8.1 K/UL (ref 4.6–13.2)

## 2024-07-31 PROCEDURE — 82803 BLOOD GASES ANY COMBINATION: CPT

## 2024-07-31 PROCEDURE — 5A09457 ASSISTANCE WITH RESPIRATORY VENTILATION, 24-96 CONSECUTIVE HOURS, CONTINUOUS POSITIVE AIRWAY PRESSURE: ICD-10-PCS | Performed by: INTERNAL MEDICINE

## 2024-07-31 PROCEDURE — 1100000000 HC RM PRIVATE

## 2024-07-31 PROCEDURE — 2580000003 HC RX 258: Performed by: EMERGENCY MEDICINE

## 2024-07-31 PROCEDURE — 87040 BLOOD CULTURE FOR BACTERIA: CPT

## 2024-07-31 PROCEDURE — 94660 CPAP INITIATION&MGMT: CPT

## 2024-07-31 PROCEDURE — 96374 THER/PROPH/DIAG INJ IV PUSH: CPT

## 2024-07-31 PROCEDURE — 85610 PROTHROMBIN TIME: CPT

## 2024-07-31 PROCEDURE — 87636 SARSCOV2 & INF A&B AMP PRB: CPT

## 2024-07-31 PROCEDURE — 36415 COLL VENOUS BLD VENIPUNCTURE: CPT

## 2024-07-31 PROCEDURE — 2580000003 HC RX 258: Performed by: NURSE PRACTITIONER

## 2024-07-31 PROCEDURE — 6360000002 HC RX W HCPCS: Performed by: EMERGENCY MEDICINE

## 2024-07-31 PROCEDURE — 84484 ASSAY OF TROPONIN QUANT: CPT

## 2024-07-31 PROCEDURE — 2000000000 HC ICU R&B

## 2024-07-31 PROCEDURE — 6370000000 HC RX 637 (ALT 250 FOR IP): Performed by: NURSE PRACTITIONER

## 2024-07-31 PROCEDURE — 84145 PROCALCITONIN (PCT): CPT

## 2024-07-31 PROCEDURE — 6360000002 HC RX W HCPCS: Performed by: NURSE PRACTITIONER

## 2024-07-31 PROCEDURE — 81003 URINALYSIS AUTO W/O SCOPE: CPT

## 2024-07-31 PROCEDURE — 82962 GLUCOSE BLOOD TEST: CPT

## 2024-07-31 PROCEDURE — 83880 ASSAY OF NATRIURETIC PEPTIDE: CPT

## 2024-07-31 PROCEDURE — 2700000000 HC OXYGEN THERAPY PER DAY

## 2024-07-31 PROCEDURE — 86140 C-REACTIVE PROTEIN: CPT

## 2024-07-31 PROCEDURE — 93005 ELECTROCARDIOGRAM TRACING: CPT | Performed by: EMERGENCY MEDICINE

## 2024-07-31 PROCEDURE — 84100 ASSAY OF PHOSPHORUS: CPT

## 2024-07-31 PROCEDURE — 36600 WITHDRAWAL OF ARTERIAL BLOOD: CPT

## 2024-07-31 PROCEDURE — 83735 ASSAY OF MAGNESIUM: CPT

## 2024-07-31 PROCEDURE — 83605 ASSAY OF LACTIC ACID: CPT

## 2024-07-31 PROCEDURE — 71045 X-RAY EXAM CHEST 1 VIEW: CPT

## 2024-07-31 PROCEDURE — 85025 COMPLETE CBC W/AUTO DIFF WBC: CPT

## 2024-07-31 PROCEDURE — 94761 N-INVAS EAR/PLS OXIMETRY MLT: CPT

## 2024-07-31 PROCEDURE — 80053 COMPREHEN METABOLIC PANEL: CPT

## 2024-07-31 RX ORDER — DEXTROSE MONOHYDRATE 100 MG/ML
INJECTION, SOLUTION INTRAVENOUS CONTINUOUS PRN
Status: DISCONTINUED | OUTPATIENT
Start: 2024-07-31 | End: 2024-08-05 | Stop reason: HOSPADM

## 2024-07-31 RX ORDER — FUROSEMIDE 10 MG/ML
60 INJECTION INTRAMUSCULAR; INTRAVENOUS DAILY
Status: DISCONTINUED | OUTPATIENT
Start: 2024-07-31 | End: 2024-08-01

## 2024-07-31 RX ORDER — GUAIFENESIN 200 MG/10ML
200 LIQUID ORAL EVERY 4 HOURS PRN
Status: DISCONTINUED | OUTPATIENT
Start: 2024-07-31 | End: 2024-08-05 | Stop reason: HOSPADM

## 2024-07-31 RX ORDER — INSULIN GLARGINE 100 [IU]/ML
0.15 INJECTION, SOLUTION SUBCUTANEOUS NIGHTLY
Status: DISCONTINUED | OUTPATIENT
Start: 2024-07-31 | End: 2024-08-05 | Stop reason: HOSPADM

## 2024-07-31 RX ORDER — FUROSEMIDE 10 MG/ML
60 INJECTION INTRAMUSCULAR; INTRAVENOUS ONCE
Status: COMPLETED | OUTPATIENT
Start: 2024-07-31 | End: 2024-07-31

## 2024-07-31 RX ORDER — INSULIN LISPRO 100 [IU]/ML
0-4 INJECTION, SOLUTION INTRAVENOUS; SUBCUTANEOUS NIGHTLY
Status: DISCONTINUED | OUTPATIENT
Start: 2024-07-31 | End: 2024-08-05 | Stop reason: HOSPADM

## 2024-07-31 RX ORDER — ATORVASTATIN CALCIUM 10 MG/1
20 TABLET, FILM COATED ORAL
Status: DISCONTINUED | OUTPATIENT
Start: 2024-07-31 | End: 2024-07-31

## 2024-07-31 RX ORDER — DEXAMETHASONE SODIUM PHOSPHATE 10 MG/ML
6 INJECTION, SOLUTION INTRAMUSCULAR; INTRAVENOUS DAILY
Status: DISCONTINUED | OUTPATIENT
Start: 2024-07-31 | End: 2024-08-05 | Stop reason: HOSPADM

## 2024-07-31 RX ORDER — FUROSEMIDE 10 MG/ML
40 INJECTION INTRAMUSCULAR; INTRAVENOUS 2 TIMES DAILY
Status: DISCONTINUED | OUTPATIENT
Start: 2024-07-31 | End: 2024-07-31

## 2024-07-31 RX ORDER — INSULIN LISPRO 100 [IU]/ML
0-8 INJECTION, SOLUTION INTRAVENOUS; SUBCUTANEOUS
Status: DISCONTINUED | OUTPATIENT
Start: 2024-07-31 | End: 2024-08-05 | Stop reason: HOSPADM

## 2024-07-31 RX ORDER — INSULIN LISPRO 100 [IU]/ML
0.05 INJECTION, SOLUTION INTRAVENOUS; SUBCUTANEOUS
Status: DISCONTINUED | OUTPATIENT
Start: 2024-07-31 | End: 2024-08-05 | Stop reason: HOSPADM

## 2024-07-31 RX ADMIN — AZITHROMYCIN MONOHYDRATE 500 MG: 500 INJECTION, POWDER, LYOPHILIZED, FOR SOLUTION INTRAVENOUS at 04:19

## 2024-07-31 RX ADMIN — PIPERACILLIN AND TAZOBACTAM 3375 MG: 3; .375 INJECTION, POWDER, LYOPHILIZED, FOR SOLUTION INTRAVENOUS at 08:30

## 2024-07-31 RX ADMIN — INSULIN LISPRO 7 UNITS: 100 INJECTION, SOLUTION INTRAVENOUS; SUBCUTANEOUS at 11:05

## 2024-07-31 RX ADMIN — FUROSEMIDE 60 MG: 10 INJECTION, SOLUTION INTRAMUSCULAR; INTRAVENOUS at 08:35

## 2024-07-31 RX ADMIN — INSULIN GLARGINE 22 UNITS: 100 INJECTION, SOLUTION SUBCUTANEOUS at 20:45

## 2024-07-31 RX ADMIN — APIXABAN 5 MG: 5 TABLET, FILM COATED ORAL at 20:45

## 2024-07-31 RX ADMIN — INSULIN LISPRO 7 UNITS: 100 INJECTION, SOLUTION INTRAVENOUS; SUBCUTANEOUS at 17:28

## 2024-07-31 RX ADMIN — DEXAMETHASONE SODIUM PHOSPHATE 6 MG: 10 INJECTION, SOLUTION INTRAMUSCULAR; INTRAVENOUS at 08:32

## 2024-07-31 RX ADMIN — PIPERACILLIN AND TAZOBACTAM 3375 MG: 3; .375 INJECTION, POWDER, LYOPHILIZED, FOR SOLUTION INTRAVENOUS at 02:45

## 2024-07-31 RX ADMIN — APIXABAN 5 MG: 5 TABLET, FILM COATED ORAL at 08:36

## 2024-07-31 RX ADMIN — PIPERACILLIN AND TAZOBACTAM 3375 MG: 3; .375 INJECTION, POWDER, LYOPHILIZED, FOR SOLUTION INTRAVENOUS at 16:00

## 2024-07-31 RX ADMIN — FUROSEMIDE 60 MG: 10 INJECTION, SOLUTION INTRAMUSCULAR; INTRAVENOUS at 00:29

## 2024-07-31 ASSESSMENT — PAIN SCALES - GENERAL: PAINLEVEL_OUTOF10: 0

## 2024-07-31 NOTE — ED TRIAGE NOTES
Pt arrived via ems for shortness of breath. Pt states it started about 30 minutes ago. Hx copd and chf. Pt given duoneb by ems. Pt has inspiratory and expiratory wheezes throughout. Productive cough.

## 2024-07-31 NOTE — ED PROVIDER NOTES
[facility] EMERGENCY DEPT  EMERGENCY DEPARTMENT ENCOUNTER       Pt Name: Jasmin Pacheco  MRN: [id number]  Birthdate 1945  Date of evaluation: 2024  Provider: Radha Magana MD   PCP: Edgard Harrington MD  Note Started: 12:02 AM 24     CHIEF COMPLAINT       Chief Complaint   Patient presents with    Shortness of Breath        HISTORY OF PRESENT ILLNESS: 1 or more elements      History From: Patient  History limited by: Nothing     Jasmin Pacheco is a 78 y.o. female who presents to the ED brought in by EMS due to shortness of breath.  Patient reports that became short of breath tonight.  She reports that she wears a CPAP and uses 2 L oxygen at night and 1 L during the day.  She admits to COPD.  She is rather poor historian.  She denies chest pain, cough, fever.     Nursing Notes were all reviewed and agreed with or any disagreements were addressed in the HPI.     REVIEW OF SYSTEMS      Review of Systems     Positives and Pertinent negatives as per HPI.    PAST HISTORY     Past Medical History:  Past Medical History:   Diagnosis Date    CHF (congestive heart failure) (HCC)     Diabetes (HCC)     Hyperlipemia     Sleep apnea        Past Surgical History:  Past Surgical History:   Procedure Laterality Date    CATARACT REMOVAL Bilateral     TOTAL HIP ARTHROPLASTY Bilateral     TOTAL KNEE ARTHROPLASTY Bilateral        Family History:  No family history on file.    Social History:  Social History     Tobacco Use    Smoking status: Former     Current packs/day: 0.00     Types: Cigarettes     Quit date:      Years since quittin.6    Smokeless tobacco: Never   Substance Use Topics    Alcohol use: Not Currently    Drug use: Never       Allergies:  No Known Allergies    CURRENT MEDICATIONS      Previous Medications    ALBUTEROL SULFATE HFA (PROVENTIL;VENTOLIN;PROAIR) 108 (90 BASE) MCG/ACT INHALER    Inhale 2 puffs into the lungs every 6 hours as needed for Wheezing    APIXABAN (ELIQUIS) 5 MG TABS TABLET    Take 1

## 2024-07-31 NOTE — H&P
__________________________________________________  Care Plan discussed with:    Comments   Patient X    Family      RN X    Care Manager                    Consultant:      _______________________________________________________________________  Expected  Disposition:   Home with Family X   HH/PT/OT/RN    SNF/LTC    JOSE    ________________________________________________________________________      TOTAL TIME:  45 Minutes    Code Status: Full    Prophylaxis:  chemical and mechanical    Electronically Signed : ROXY Mittal CNP [facility]

## 2024-07-31 NOTE — PLAN OF CARE
Problem: Chronic Conditions and Co-morbidities  Goal: Patient's chronic conditions and co-morbidity symptoms are monitored and maintained or improved  7/31/2024 1033 by Jacinda Genao RN  Outcome: Progressing  7/31/2024 0319 by Estefanía Arias RN  Outcome: Progressing     Problem: Discharge Planning  Goal: Discharge to home or other facility with appropriate resources  7/31/2024 0319 by Estefanía Arias, RN  Outcome: Progressing     Problem: Skin/Tissue Integrity  Goal: Absence of new skin breakdown  Description: 1.  Monitor for areas of redness and/or skin breakdown  2.  Assess vascular access sites hourly  3.  Every 4-6 hours minimum:  Change oxygen saturation probe site  4.  Every 4-6 hours:  If on nasal continuous positive airway pressure, respiratory therapy assess nares and determine need for appliance change or resting period.  7/31/2024 0319 by Estefanía Arias, RN  Outcome: Progressing     Problem: Safety - Adult  Goal: Free from fall injury  7/31/2024 0319 by Estefanía Arias, RN  Outcome: Progressing     Problem: ABCDS Injury Assessment  Goal: Absence of physical injury  7/31/2024 0319 by Estefanía Arias, RN  Outcome: Progressing

## 2024-08-01 LAB
ALBUMIN SERPL-MCNC: 2.3 G/DL (ref 3.4–5)
ALBUMIN/GLOB SERPL: 0.4 (ref 0.8–1.7)
ALP SERPL-CCNC: 110 U/L (ref 45–117)
ALT SERPL-CCNC: 14 U/L (ref 13–56)
ANION GAP SERPL CALC-SCNC: 6 MMOL/L (ref 3–18)
AST SERPL W P-5'-P-CCNC: 11 U/L (ref 10–38)
BASOPHILS # BLD: 0 K/UL (ref 0–0.1)
BASOPHILS NFR BLD: 0 % (ref 0–2)
BILIRUB SERPL-MCNC: 1 MG/DL (ref 0.2–1)
BUN SERPL-MCNC: 25 MG/DL (ref 7–18)
BUN/CREAT SERPL: 13 (ref 12–20)
CA-I BLD-MCNC: 8.3 MG/DL (ref 8.5–10.1)
CHLORIDE SERPL-SCNC: 97 MMOL/L (ref 100–111)
CO2 SERPL-SCNC: 37 MMOL/L (ref 21–32)
CREAT SERPL-MCNC: 1.96 MG/DL (ref 0.6–1.3)
DIFFERENTIAL METHOD BLD: ABNORMAL
EOSINOPHIL # BLD: 0 K/UL (ref 0–0.4)
EOSINOPHIL NFR BLD: 0 % (ref 0–5)
ERYTHROCYTE [DISTWIDTH] IN BLOOD BY AUTOMATED COUNT: 15.7 % (ref 11.6–14.5)
GLOBULIN SER CALC-MCNC: 5.6 G/DL (ref 2–4)
GLUCOSE BLD STRIP.AUTO-MCNC: 155 MG/DL (ref 70–110)
GLUCOSE BLD STRIP.AUTO-MCNC: 158 MG/DL (ref 70–110)
GLUCOSE BLD STRIP.AUTO-MCNC: 182 MG/DL (ref 70–110)
GLUCOSE BLD STRIP.AUTO-MCNC: 182 MG/DL (ref 70–110)
GLUCOSE BLD STRIP.AUTO-MCNC: 204 MG/DL (ref 70–110)
GLUCOSE SERPL-MCNC: 193 MG/DL (ref 74–99)
HCT VFR BLD AUTO: 44.5 % (ref 35–45)
HGB BLD-MCNC: 13.5 G/DL (ref 12–16)
IMM GRANULOCYTES # BLD AUTO: 0 K/UL
IMM GRANULOCYTES NFR BLD AUTO: 0 %
LYMPHOCYTES # BLD: 0.2 K/UL (ref 0.9–3.6)
LYMPHOCYTES NFR BLD: 1 % (ref 21–52)
MAGNESIUM SERPL-MCNC: 1.9 MG/DL (ref 1.6–2.6)
MCH RBC QN AUTO: 28.7 PG (ref 24–34)
MCHC RBC AUTO-ENTMCNC: 30.3 G/DL (ref 31–37)
MCV RBC AUTO: 94.5 FL (ref 78–100)
METAMYELOCYTES NFR BLD MANUAL: 5 %
MONOCYTES # BLD: 0.2 K/UL (ref 0.05–1.2)
MONOCYTES NFR BLD: 1 % (ref 3–10)
NEUTS BAND NFR BLD MANUAL: 25 % (ref 0–5)
NEUTS SEG # BLD: 22.2 K/UL (ref 1.8–8)
NEUTS SEG NFR BLD: 68 % (ref 40–73)
NRBC # BLD: 0 K/UL (ref 0–0.01)
NRBC BLD-RTO: 0 PER 100 WBC
PERFORMED BY:: ABNORMAL
PHOSPHATE SERPL-MCNC: 3 MG/DL (ref 2.5–4.9)
PLATELET # BLD AUTO: 208 K/UL (ref 135–420)
PMV BLD AUTO: 10.3 FL (ref 9.2–11.8)
POTASSIUM SERPL-SCNC: 4 MMOL/L (ref 3.5–5.5)
PROT SERPL-MCNC: 7.9 G/DL (ref 6.4–8.2)
RBC # BLD AUTO: 4.71 M/UL (ref 4.2–5.3)
RBC MORPH BLD: ABNORMAL
SODIUM SERPL-SCNC: 140 MMOL/L (ref 136–145)
WBC # BLD AUTO: 23.9 K/UL (ref 4.6–13.2)

## 2024-08-01 PROCEDURE — 6360000002 HC RX W HCPCS: Performed by: INTERNAL MEDICINE

## 2024-08-01 PROCEDURE — 84100 ASSAY OF PHOSPHORUS: CPT

## 2024-08-01 PROCEDURE — 2700000000 HC OXYGEN THERAPY PER DAY

## 2024-08-01 PROCEDURE — 85025 COMPLETE CBC W/AUTO DIFF WBC: CPT

## 2024-08-01 PROCEDURE — 2580000003 HC RX 258: Performed by: NURSE PRACTITIONER

## 2024-08-01 PROCEDURE — 36415 COLL VENOUS BLD VENIPUNCTURE: CPT

## 2024-08-01 PROCEDURE — 6360000002 HC RX W HCPCS: Performed by: NURSE PRACTITIONER

## 2024-08-01 PROCEDURE — 1100000000 HC RM PRIVATE

## 2024-08-01 PROCEDURE — 83735 ASSAY OF MAGNESIUM: CPT

## 2024-08-01 PROCEDURE — 94660 CPAP INITIATION&MGMT: CPT

## 2024-08-01 PROCEDURE — 80053 COMPREHEN METABOLIC PANEL: CPT

## 2024-08-01 PROCEDURE — 94761 N-INVAS EAR/PLS OXIMETRY MLT: CPT

## 2024-08-01 PROCEDURE — 2000000000 HC ICU R&B

## 2024-08-01 PROCEDURE — 6370000000 HC RX 637 (ALT 250 FOR IP): Performed by: NURSE PRACTITIONER

## 2024-08-01 PROCEDURE — 82962 GLUCOSE BLOOD TEST: CPT

## 2024-08-01 RX ORDER — DIPHENHYDRAMINE HYDROCHLORIDE 50 MG/ML
25 INJECTION INTRAMUSCULAR; INTRAVENOUS EVERY 6 HOURS PRN
Status: DISCONTINUED | OUTPATIENT
Start: 2024-08-01 | End: 2024-08-05 | Stop reason: HOSPADM

## 2024-08-01 RX ADMIN — INSULIN LISPRO 7 UNITS: 100 INJECTION, SOLUTION INTRAVENOUS; SUBCUTANEOUS at 11:36

## 2024-08-01 RX ADMIN — AZITHROMYCIN MONOHYDRATE 500 MG: 500 INJECTION, POWDER, LYOPHILIZED, FOR SOLUTION INTRAVENOUS at 04:21

## 2024-08-01 RX ADMIN — INSULIN LISPRO 7 UNITS: 100 INJECTION, SOLUTION INTRAVENOUS; SUBCUTANEOUS at 17:07

## 2024-08-01 RX ADMIN — DEXAMETHASONE SODIUM PHOSPHATE 6 MG: 10 INJECTION, SOLUTION INTRAMUSCULAR; INTRAVENOUS at 08:09

## 2024-08-01 RX ADMIN — FUROSEMIDE 60 MG: 10 INJECTION, SOLUTION INTRAMUSCULAR; INTRAVENOUS at 08:09

## 2024-08-01 RX ADMIN — DIPHENHYDRAMINE HYDROCHLORIDE 25 MG: 50 INJECTION INTRAMUSCULAR; INTRAVENOUS at 11:05

## 2024-08-01 RX ADMIN — PIPERACILLIN AND TAZOBACTAM 3375 MG: 3; .375 INJECTION, POWDER, LYOPHILIZED, FOR SOLUTION INTRAVENOUS at 00:32

## 2024-08-01 RX ADMIN — APIXABAN 5 MG: 5 TABLET, FILM COATED ORAL at 21:09

## 2024-08-01 RX ADMIN — PIPERACILLIN AND TAZOBACTAM 3375 MG: 3; .375 INJECTION, POWDER, LYOPHILIZED, FOR SOLUTION INTRAVENOUS at 08:11

## 2024-08-01 RX ADMIN — DIPHENHYDRAMINE HYDROCHLORIDE 25 MG: 50 INJECTION INTRAMUSCULAR; INTRAVENOUS at 21:09

## 2024-08-01 RX ADMIN — INSULIN LISPRO 2 UNITS: 100 INJECTION, SOLUTION INTRAVENOUS; SUBCUTANEOUS at 11:37

## 2024-08-01 RX ADMIN — INSULIN LISPRO 7 UNITS: 100 INJECTION, SOLUTION INTRAVENOUS; SUBCUTANEOUS at 08:10

## 2024-08-01 RX ADMIN — APIXABAN 5 MG: 5 TABLET, FILM COATED ORAL at 08:41

## 2024-08-01 RX ADMIN — DIPHENHYDRAMINE HYDROCHLORIDE 25 MG: 50 INJECTION INTRAMUSCULAR; INTRAVENOUS at 03:49

## 2024-08-01 RX ADMIN — INSULIN GLARGINE 22 UNITS: 100 INJECTION, SOLUTION SUBCUTANEOUS at 21:08

## 2024-08-01 ASSESSMENT — PAIN SCALES - GENERAL
PAINLEVEL_OUTOF10: 0

## 2024-08-01 NOTE — PLAN OF CARE
Problem: Chronic Conditions and Co-morbidities  Goal: Patient's chronic conditions and co-morbidity symptoms are monitored and maintained or improved  7/31/2024 2005 by Nohelia Ayala LPN  Outcome: Progressing  Flowsheets (Taken 7/31/2024 1935)  Care Plan - Patient's Chronic Conditions and Co-Morbidity Symptoms are Monitored and Maintained or Improved:   Monitor and assess patient's chronic conditions and comorbid symptoms for stability, deterioration, or improvement   Collaborate with multidisciplinary team to address chronic and comorbid conditions and prevent exacerbation or deterioration   Update acute care plan with appropriate goals if chronic or comorbid symptoms are exacerbated and prevent overall improvement and discharge  7/31/2024 1033 by Jacinda Genao, RN  Outcome: Progressing     Problem: Discharge Planning  Goal: Discharge to home or other facility with appropriate resources  Outcome: Progressing  Flowsheets (Taken 7/31/2024 1935)  Discharge to home or other facility with appropriate resources:   Identify barriers to discharge with patient and caregiver   Arrange for needed discharge resources and transportation as appropriate   Identify discharge learning needs (meds, wound care, etc)   Refer to discharge planning if patient needs post-hospital services based on physician order or complex needs related to functional status, cognitive ability or social support system     Problem: Skin/Tissue Integrity  Goal: Absence of new skin breakdown  Description: 1.  Monitor for areas of redness and/or skin breakdown  2.  Assess vascular access sites hourly  3.  Every 4-6 hours minimum:  Change oxygen saturation probe site  4.  Every 4-6 hours:  If on nasal continuous positive airway pressure, respiratory therapy assess nares and determine need for appliance change or resting period.  Outcome: Progressing     Problem: Safety - Adult  Goal: Free from fall injury  Outcome: Progressing

## 2024-08-02 ENCOUNTER — APPOINTMENT (OUTPATIENT)
Age: 79
DRG: 177 | End: 2024-08-02
Payer: MEDICARE

## 2024-08-02 LAB
ANION GAP SERPL CALC-SCNC: 3 MMOL/L (ref 3–18)
BASOPHILS # BLD: 0 K/UL (ref 0–0.1)
BASOPHILS NFR BLD: 0 % (ref 0–2)
BUN SERPL-MCNC: 33 MG/DL (ref 7–18)
BUN/CREAT SERPL: 16 (ref 12–20)
CA-I BLD-MCNC: 8.7 MG/DL (ref 8.5–10.1)
CHLORIDE SERPL-SCNC: 100 MMOL/L (ref 100–111)
CO2 SERPL-SCNC: 37 MMOL/L (ref 21–32)
CREAT SERPL-MCNC: 2.1 MG/DL (ref 0.6–1.3)
DIFFERENTIAL METHOD BLD: ABNORMAL
EOSINOPHIL # BLD: 0 K/UL (ref 0–0.4)
EOSINOPHIL NFR BLD: 0 % (ref 0–5)
ERYTHROCYTE [DISTWIDTH] IN BLOOD BY AUTOMATED COUNT: 15.5 % (ref 11.6–14.5)
GLUCOSE BLD STRIP.AUTO-MCNC: 103 MG/DL (ref 70–110)
GLUCOSE BLD STRIP.AUTO-MCNC: 158 MG/DL (ref 70–110)
GLUCOSE BLD STRIP.AUTO-MCNC: 266 MG/DL (ref 70–110)
GLUCOSE BLD STRIP.AUTO-MCNC: 301 MG/DL (ref 70–110)
GLUCOSE SERPL-MCNC: 135 MG/DL (ref 74–99)
HCT VFR BLD AUTO: 39.2 % (ref 35–45)
HGB BLD-MCNC: 11.8 G/DL (ref 12–16)
IMM GRANULOCYTES # BLD AUTO: 0 K/UL
IMM GRANULOCYTES NFR BLD AUTO: 0 %
LYMPHOCYTES # BLD: 1.2 K/UL (ref 0.9–3.6)
LYMPHOCYTES NFR BLD: 5 % (ref 21–52)
MAGNESIUM SERPL-MCNC: 1.9 MG/DL (ref 1.6–2.6)
MCH RBC QN AUTO: 28.2 PG (ref 24–34)
MCHC RBC AUTO-ENTMCNC: 30.1 G/DL (ref 31–37)
MCV RBC AUTO: 93.6 FL (ref 78–100)
MONOCYTES # BLD: 0 K/UL (ref 0.05–1.2)
MONOCYTES NFR BLD: 0 % (ref 3–10)
NEUTS BAND NFR BLD MANUAL: 4 % (ref 0–5)
NEUTS SEG # BLD: 22.2 K/UL (ref 1.8–8)
NEUTS SEG NFR BLD: 91 % (ref 40–73)
NRBC # BLD: 0 K/UL (ref 0–0.01)
NRBC BLD-RTO: 0 PER 100 WBC
PERFORMED BY:: ABNORMAL
PERFORMED BY:: NORMAL
PHOSPHATE SERPL-MCNC: 2.4 MG/DL (ref 2.5–4.9)
PLATELET # BLD AUTO: 192 K/UL (ref 135–420)
PMV BLD AUTO: 9.8 FL (ref 9.2–11.8)
RBC # BLD AUTO: 4.19 M/UL (ref 4.2–5.3)
RBC MORPH BLD: ABNORMAL
SODIUM SERPL-SCNC: 140 MMOL/L (ref 136–145)
WBC # BLD AUTO: 23.4 K/UL (ref 4.6–13.2)

## 2024-08-02 PROCEDURE — 82436 ASSAY OF URINE CHLORIDE: CPT

## 2024-08-02 PROCEDURE — 82570 ASSAY OF URINE CREATININE: CPT

## 2024-08-02 PROCEDURE — 2700000000 HC OXYGEN THERAPY PER DAY

## 2024-08-02 PROCEDURE — 85025 COMPLETE CBC W/AUTO DIFF WBC: CPT

## 2024-08-02 PROCEDURE — 71045 X-RAY EXAM CHEST 1 VIEW: CPT

## 2024-08-02 PROCEDURE — 80048 BASIC METABOLIC PNL TOTAL CA: CPT

## 2024-08-02 PROCEDURE — 6370000000 HC RX 637 (ALT 250 FOR IP): Performed by: NURSE PRACTITIONER

## 2024-08-02 PROCEDURE — 94660 CPAP INITIATION&MGMT: CPT

## 2024-08-02 PROCEDURE — 94761 N-INVAS EAR/PLS OXIMETRY MLT: CPT

## 2024-08-02 PROCEDURE — 83735 ASSAY OF MAGNESIUM: CPT

## 2024-08-02 PROCEDURE — 6360000002 HC RX W HCPCS: Performed by: INTERNAL MEDICINE

## 2024-08-02 PROCEDURE — 1100000000 HC RM PRIVATE

## 2024-08-02 PROCEDURE — 84300 ASSAY OF URINE SODIUM: CPT

## 2024-08-02 PROCEDURE — 84100 ASSAY OF PHOSPHORUS: CPT

## 2024-08-02 PROCEDURE — 84156 ASSAY OF PROTEIN URINE: CPT

## 2024-08-02 PROCEDURE — 82962 GLUCOSE BLOOD TEST: CPT

## 2024-08-02 PROCEDURE — 6360000002 HC RX W HCPCS: Performed by: NURSE PRACTITIONER

## 2024-08-02 RX ORDER — FUROSEMIDE 10 MG/ML
40 INJECTION INTRAMUSCULAR; INTRAVENOUS DAILY
Status: DISCONTINUED | OUTPATIENT
Start: 2024-08-03 | End: 2024-08-04

## 2024-08-02 RX ADMIN — DIPHENHYDRAMINE HYDROCHLORIDE 25 MG: 50 INJECTION INTRAMUSCULAR; INTRAVENOUS at 08:55

## 2024-08-02 RX ADMIN — INSULIN LISPRO 6 UNITS: 100 INJECTION, SOLUTION INTRAVENOUS; SUBCUTANEOUS at 16:17

## 2024-08-02 RX ADMIN — INSULIN LISPRO 7 UNITS: 100 INJECTION, SOLUTION INTRAVENOUS; SUBCUTANEOUS at 16:17

## 2024-08-02 RX ADMIN — DEXAMETHASONE SODIUM PHOSPHATE 6 MG: 10 INJECTION, SOLUTION INTRAMUSCULAR; INTRAVENOUS at 08:55

## 2024-08-02 RX ADMIN — APIXABAN 5 MG: 5 TABLET, FILM COATED ORAL at 08:55

## 2024-08-02 RX ADMIN — APIXABAN 5 MG: 5 TABLET, FILM COATED ORAL at 20:26

## 2024-08-02 RX ADMIN — DIPHENHYDRAMINE HYDROCHLORIDE 25 MG: 50 INJECTION INTRAMUSCULAR; INTRAVENOUS at 20:39

## 2024-08-02 RX ADMIN — INSULIN GLARGINE 22 UNITS: 100 INJECTION, SOLUTION SUBCUTANEOUS at 20:38

## 2024-08-02 ASSESSMENT — PAIN SCALES - GENERAL
PAINLEVEL_OUTOF10: 0

## 2024-08-02 NOTE — CONSULTS
NAME:  Jasmin Pacheco   :   1945   MRN:   [id number]     ATTENDING: Tyler Winston MD  PCP:  Edgard Harrington MD    Date/Time:  2024       Subjective:   REQUESTING PHYSICIAN:  REASON FOR CONSULT:  LEA      History of presenting illness: Patient is a 78-year-old -American female with past medical history of morbid obesity, COPD, obstructive sleep apnea, CHF, hypertension, type 2 diabetes, hyperlipidemia, chronic kidney disease presented to the emergency room with complaints of shortness of breath.  Initial labs showed a creatinine of 1.6 which has increased to 2.1 this morning and a nephrology consultation was requested for further evaluation and management.  Patient seen at bedside, she is alert awake answering simple questions appropriately.  She is not aware of any prior kidney disease but review of old labs showed a baseline creatinine of around 1.5-1.7.  Patient received few doses of IV diuretics in last 48 hours.  No complaints of any significant swelling in her lower extremities at this time.  Patient was on Lasix 40 mg p.o. twice daily on review of home medications.  Patient is hemodynamically stable but some intermittent borderline low hypotension noted.       Past Medical History:   Diagnosis Date    CHF (congestive heart failure) (HCC)     Diabetes (HCC)     Hyperlipemia     Sleep apnea       Past Surgical History:   Procedure Laterality Date    CATARACT REMOVAL Bilateral     TOTAL HIP ARTHROPLASTY Bilateral     TOTAL KNEE ARTHROPLASTY Bilateral      Social History     Tobacco Use    Smoking status: Former     Current packs/day: 0.00     Types: Cigarettes     Quit date:      Years since quittin.6    Smokeless tobacco: Never   Substance Use Topics    Alcohol use: Not Currently      History reviewed. No pertinent family history.    Allergies   Allergen Reactions    Azithromycin Itching and Rash      Prior to Admission medications    Medication Sig Start Date End Date Taking?

## 2024-08-02 NOTE — PLAN OF CARE
Problem: Chronic Conditions and Co-morbidities  Goal: Patient's chronic conditions and co-morbidity symptoms are monitored and maintained or improved  Outcome: Not Progressing     Problem: Respiratory - Adult  Goal: Achieves optimal ventilation and oxygenation  Outcome: Not Progressing     Problem: Musculoskeletal - Adult  Goal: Return mobility to safest level of function  Outcome: Not Progressing  Goal: Maintain proper alignment of affected body part  Outcome: Not Progressing  Goal: Return ADL status to a safe level of function  Outcome: Not Progressing     Problem: Skin/Tissue Integrity  Goal: Absence of new skin breakdown  Description: 1.  Monitor for areas of redness and/or skin breakdown  2.  Assess vascular access sites hourly  3.  Every 4-6 hours minimum:  Change oxygen saturation probe site  4.  Every 4-6 hours:  If on nasal continuous positive airway pressure, respiratory therapy assess nares and determine need for appliance change or resting period.  Outcome: Progressing     Problem: Safety - Adult  Goal: Free from fall injury  Outcome: Progressing     Problem: ABCDS Injury Assessment  Goal: Absence of physical injury  Outcome: Progressing     Problem: Cardiovascular - Adult  Goal: Maintains optimal cardiac output and hemodynamic stability  Outcome: Progressing  Goal: Absence of cardiac dysrhythmias or at baseline  Outcome: Progressing     Problem: Skin/Tissue Integrity - Adult  Goal: Skin integrity remains intact  Outcome: Progressing  Goal: Incisions, wounds, or drain sites healing without S/S of infection  Outcome: Progressing  Goal: Oral mucous membranes remain intact  Outcome: Progressing     Problem: Chronic Conditions and Co-morbidities  Goal: Patient's chronic conditions and co-morbidity symptoms are monitored and maintained or improved  Outcome: Not Progressing     Problem: Respiratory - Adult  Goal: Achieves optimal ventilation and oxygenation  Outcome: Not Progressing     Problem:

## 2024-08-02 NOTE — PLAN OF CARE
Problem: Chronic Conditions and Co-morbidities  Goal: Patient's chronic conditions and co-morbidity symptoms are monitored and maintained or improved  8/2/2024 0815 by Adri Parrish RN  Outcome: Progressing  8/2/2024 0542 by Sejal Tubbs RN  Outcome: Not Progressing     Problem: Discharge Planning  Goal: Discharge to home or other facility with appropriate resources  Outcome: Progressing     Problem: Skin/Tissue Integrity  Goal: Absence of new skin breakdown  8/2/2024 0815 by Adri Parrish RN  Outcome: Progressing  8/2/2024 0542 by Sejal Tubbs RN  Outcome: Progressing     Problem: Safety - Adult  Goal: Free from fall injury  8/2/2024 0815 by Adri Parrish RN  Outcome: Progressing  8/2/2024 0542 by Sejal Tubbs RN  Outcome: Progressing     Problem: ABCDS Injury Assessment  Goal: Absence of physical injury  8/2/2024 0815 by Adri Parrish RN  Outcome: Progressing  8/2/2024 0542 by Sejal Tubbs RN  Outcome: Progressing     Problem: Respiratory - Adult  Goal: Achieves optimal ventilation and oxygenation  8/2/2024 0815 by Adri Parrish RN  Outcome: Progressing  8/2/2024 0542 by Sejal Tubbs RN  Outcome: Not Progressing     Problem: Cardiovascular - Adult  Goal: Maintains optimal cardiac output and hemodynamic stability  8/2/2024 0815 by Adri Parrish RN  Outcome: Progressing  8/2/2024 0542 by Sejal Tubbs RN  Outcome: Progressing  Goal: Absence of cardiac dysrhythmias or at baseline  8/2/2024 0815 by Adri Parrish RN  Outcome: Progressing  8/2/2024 0542 by Sejal Tubbs RN  Outcome: Progressing     Problem: Skin/Tissue Integrity - Adult  Goal: Skin integrity remains intact  8/2/2024 0815 by Adri Parrish RN  Outcome: Progressing  8/2/2024 0542 by Sejal Tubbs RN  Outcome: Progressing  Goal: Incisions, wounds, or drain sites healing without S/S of infection  8/2/2024 0815 by Adri Parrish

## 2024-08-03 LAB
25(OH)D3 SERPL-MCNC: 4.6 NG/ML (ref 30–100)
ALBUMIN SERPL-MCNC: 2.4 G/DL (ref 3.4–5)
ANION GAP SERPL CALC-SCNC: 1 MMOL/L (ref 3–18)
BUN SERPL-MCNC: 39 MG/DL (ref 7–18)
BUN/CREAT SERPL: 24 (ref 12–20)
CA-I BLD-MCNC: 8.5 MG/DL (ref 8.5–10.1)
CA-I BLD-MCNC: 8.8 MG/DL (ref 8.5–10.1)
CHLORIDE SERPL-SCNC: 100 MMOL/L (ref 100–111)
CHLORIDE UR-SCNC: <10 MMOL/L (ref 55–125)
CK SERPL-CCNC: 36 U/L (ref 26–192)
CO2 SERPL-SCNC: 40 MMOL/L (ref 21–32)
CREAT SERPL-MCNC: 1.61 MG/DL (ref 0.6–1.3)
CREAT UR-MCNC: 141 MG/DL (ref 30–125)
GLUCOSE BLD STRIP.AUTO-MCNC: 157 MG/DL (ref 70–110)
GLUCOSE BLD STRIP.AUTO-MCNC: 214 MG/DL (ref 70–110)
GLUCOSE BLD STRIP.AUTO-MCNC: 217 MG/DL (ref 70–110)
GLUCOSE BLD STRIP.AUTO-MCNC: 86 MG/DL (ref 70–110)
GLUCOSE SERPL-MCNC: 83 MG/DL (ref 74–99)
PERFORMED BY:: ABNORMAL
PERFORMED BY:: NORMAL
PHOSPHATE SERPL-MCNC: 2.9 MG/DL (ref 2.5–4.9)
POTASSIUM SERPL-SCNC: 4.2 MMOL/L (ref 3.5–5.5)
PROT UR-MCNC: 64 MG/DL
PROT/CREAT UR-RTO: 0.5
PTH-INTACT SERPL-MCNC: 289.8 PG/ML (ref 18.4–88)
SODIUM SERPL-SCNC: 141 MMOL/L (ref 136–145)
SODIUM UR-SCNC: 17 MMOL/L (ref 20–110)

## 2024-08-03 PROCEDURE — 82306 VITAMIN D 25 HYDROXY: CPT

## 2024-08-03 PROCEDURE — 36415 COLL VENOUS BLD VENIPUNCTURE: CPT

## 2024-08-03 PROCEDURE — 94761 N-INVAS EAR/PLS OXIMETRY MLT: CPT

## 2024-08-03 PROCEDURE — 6360000002 HC RX W HCPCS: Performed by: INTERNAL MEDICINE

## 2024-08-03 PROCEDURE — 6370000000 HC RX 637 (ALT 250 FOR IP): Performed by: NURSE PRACTITIONER

## 2024-08-03 PROCEDURE — 6360000002 HC RX W HCPCS: Performed by: NURSE PRACTITIONER

## 2024-08-03 PROCEDURE — 1100000000 HC RM PRIVATE

## 2024-08-03 PROCEDURE — 2700000000 HC OXYGEN THERAPY PER DAY

## 2024-08-03 PROCEDURE — P9047 ALBUMIN (HUMAN), 25%, 50ML: HCPCS | Performed by: INTERNAL MEDICINE

## 2024-08-03 PROCEDURE — 83970 ASSAY OF PARATHORMONE: CPT

## 2024-08-03 PROCEDURE — 82550 ASSAY OF CK (CPK): CPT

## 2024-08-03 PROCEDURE — 82962 GLUCOSE BLOOD TEST: CPT

## 2024-08-03 PROCEDURE — 94660 CPAP INITIATION&MGMT: CPT

## 2024-08-03 PROCEDURE — 80069 RENAL FUNCTION PANEL: CPT

## 2024-08-03 RX ORDER — ALBUMIN (HUMAN) 12.5 G/50ML
50 SOLUTION INTRAVENOUS EVERY 8 HOURS
Status: DISCONTINUED | OUTPATIENT
Start: 2024-08-03 | End: 2024-08-04

## 2024-08-03 RX ADMIN — APIXABAN 5 MG: 5 TABLET, FILM COATED ORAL at 07:52

## 2024-08-03 RX ADMIN — INSULIN LISPRO 7 UNITS: 100 INJECTION, SOLUTION INTRAVENOUS; SUBCUTANEOUS at 12:22

## 2024-08-03 RX ADMIN — FUROSEMIDE 40 MG: 10 INJECTION, SOLUTION INTRAMUSCULAR; INTRAVENOUS at 07:52

## 2024-08-03 RX ADMIN — INSULIN LISPRO 2 UNITS: 100 INJECTION, SOLUTION INTRAVENOUS; SUBCUTANEOUS at 16:26

## 2024-08-03 RX ADMIN — ALBUMIN (HUMAN) 50 G: 0.25 INJECTION, SOLUTION INTRAVENOUS at 15:45

## 2024-08-03 RX ADMIN — INSULIN GLARGINE 22 UNITS: 100 INJECTION, SOLUTION SUBCUTANEOUS at 20:34

## 2024-08-03 RX ADMIN — APIXABAN 5 MG: 5 TABLET, FILM COATED ORAL at 20:34

## 2024-08-03 RX ADMIN — DEXAMETHASONE SODIUM PHOSPHATE 6 MG: 10 INJECTION, SOLUTION INTRAMUSCULAR; INTRAVENOUS at 07:52

## 2024-08-03 RX ADMIN — DIPHENHYDRAMINE HYDROCHLORIDE 25 MG: 50 INJECTION INTRAMUSCULAR; INTRAVENOUS at 04:22

## 2024-08-03 RX ADMIN — INSULIN LISPRO 7 UNITS: 100 INJECTION, SOLUTION INTRAVENOUS; SUBCUTANEOUS at 16:26

## 2024-08-03 RX ADMIN — ALBUMIN (HUMAN) 50 G: 0.25 INJECTION, SOLUTION INTRAVENOUS at 09:07

## 2024-08-03 RX ADMIN — INSULIN LISPRO 7 UNITS: 100 INJECTION, SOLUTION INTRAVENOUS; SUBCUTANEOUS at 07:53

## 2024-08-03 ASSESSMENT — PAIN SCALES - GENERAL
PAINLEVEL_OUTOF10: 0

## 2024-08-03 NOTE — PLAN OF CARE
Problem: Chronic Conditions and Co-morbidities  Goal: Patient's chronic conditions and co-morbidity symptoms are monitored and maintained or improved  Outcome: Progressing  Flowsheets (Taken 8/3/2024 0700)  Care Plan - Patient's Chronic Conditions and Co-Morbidity Symptoms are Monitored and Maintained or Improved: Monitor and assess patient's chronic conditions and comorbid symptoms for stability, deterioration, or improvement     Problem: Discharge Planning  Goal: Discharge to home or other facility with appropriate resources  Outcome: Progressing  Flowsheets  Taken 8/3/2024 0700 by Jacinda Genao, RN  Discharge to home or other facility with appropriate resources: Identify barriers to discharge with patient and caregiver  Taken 8/2/2024 1930 by Ector Wu RN  Discharge to home or other facility with appropriate resources: Identify barriers to discharge with patient and caregiver     Problem: Skin/Tissue Integrity  Goal: Absence of new skin breakdown  Description: 1.  Monitor for areas of redness and/or skin breakdown  2.  Assess vascular access sites hourly  3.  Every 4-6 hours minimum:  Change oxygen saturation probe site  4.  Every 4-6 hours:  If on nasal continuous positive airway pressure, respiratory therapy assess nares and determine need for appliance change or resting period.  8/2/2024 2130 by Ector Wu, RN  Outcome: Progressing     Problem: Safety - Adult  Goal: Free from fall injury  8/3/2024 0722 by Jacinda Genao, RN  Outcome: Progressing  8/2/2024 2130 by Ector Wu, RN  Outcome: Progressing     Problem: ABCDS Injury Assessment  Goal: Absence of physical injury  8/2/2024 2130 by Ector Wu, RN  Outcome: Progressing

## 2024-08-04 ENCOUNTER — APPOINTMENT (OUTPATIENT)
Age: 79
DRG: 177 | End: 2024-08-04
Payer: MEDICARE

## 2024-08-04 LAB
ANION GAP SERPL CALC-SCNC: 2 MMOL/L (ref 3–18)
BUN SERPL-MCNC: 40 MG/DL (ref 7–18)
BUN/CREAT SERPL: 27 (ref 12–20)
CA-I BLD-MCNC: 9.1 MG/DL (ref 8.5–10.1)
CHLORIDE SERPL-SCNC: 99 MMOL/L (ref 100–111)
CO2 SERPL-SCNC: 45 MMOL/L (ref 21–32)
CREAT SERPL-MCNC: 1.47 MG/DL (ref 0.6–1.3)
ERYTHROCYTE [DISTWIDTH] IN BLOOD BY AUTOMATED COUNT: 15.6 % (ref 11.6–14.5)
GLUCOSE BLD STRIP.AUTO-MCNC: 115 MG/DL (ref 70–110)
GLUCOSE BLD STRIP.AUTO-MCNC: 130 MG/DL (ref 70–110)
GLUCOSE BLD STRIP.AUTO-MCNC: 174 MG/DL (ref 70–110)
GLUCOSE BLD STRIP.AUTO-MCNC: 75 MG/DL (ref 70–110)
GLUCOSE SERPL-MCNC: 107 MG/DL (ref 74–99)
HCT VFR BLD AUTO: 44 % (ref 35–45)
HGB BLD-MCNC: 12.7 G/DL (ref 12–16)
MCH RBC QN AUTO: 28.5 PG (ref 24–34)
MCHC RBC AUTO-ENTMCNC: 28.9 G/DL (ref 31–37)
MCV RBC AUTO: 98.9 FL (ref 78–100)
NRBC # BLD: 0.03 K/UL (ref 0–0.01)
NRBC BLD-RTO: 0.2 PER 100 WBC
PERFORMED BY:: ABNORMAL
PERFORMED BY:: NORMAL
PLATELET # BLD AUTO: 232 K/UL (ref 135–420)
PMV BLD AUTO: 9.8 FL (ref 9.2–11.8)
POTASSIUM SERPL-SCNC: 4.4 MMOL/L (ref 3.5–5.5)
RBC # BLD AUTO: 4.45 M/UL (ref 4.2–5.3)
SODIUM SERPL-SCNC: 146 MMOL/L (ref 136–145)
WBC # BLD AUTO: 17.7 K/UL (ref 4.6–13.2)

## 2024-08-04 PROCEDURE — 05HA33Z INSERTION OF INFUSION DEVICE INTO LEFT BRACHIAL VEIN, PERCUTANEOUS APPROACH: ICD-10-PCS | Performed by: INTERNAL MEDICINE

## 2024-08-04 PROCEDURE — P9047 ALBUMIN (HUMAN), 25%, 50ML: HCPCS | Performed by: INTERNAL MEDICINE

## 2024-08-04 PROCEDURE — 51702 INSERT TEMP BLADDER CATH: CPT

## 2024-08-04 PROCEDURE — 71045 X-RAY EXAM CHEST 1 VIEW: CPT

## 2024-08-04 PROCEDURE — 85027 COMPLETE CBC AUTOMATED: CPT

## 2024-08-04 PROCEDURE — 6370000000 HC RX 637 (ALT 250 FOR IP): Performed by: NURSE PRACTITIONER

## 2024-08-04 PROCEDURE — 6360000002 HC RX W HCPCS: Performed by: INTERNAL MEDICINE

## 2024-08-04 PROCEDURE — 94761 N-INVAS EAR/PLS OXIMETRY MLT: CPT

## 2024-08-04 PROCEDURE — 36415 COLL VENOUS BLD VENIPUNCTURE: CPT

## 2024-08-04 PROCEDURE — 1100000000 HC RM PRIVATE

## 2024-08-04 PROCEDURE — 36410 VNPNXR 3YR/> PHY/QHP DX/THER: CPT

## 2024-08-04 PROCEDURE — 80048 BASIC METABOLIC PNL TOTAL CA: CPT

## 2024-08-04 PROCEDURE — 6360000002 HC RX W HCPCS: Performed by: NURSE PRACTITIONER

## 2024-08-04 PROCEDURE — 2700000000 HC OXYGEN THERAPY PER DAY

## 2024-08-04 PROCEDURE — 82962 GLUCOSE BLOOD TEST: CPT

## 2024-08-04 PROCEDURE — 94660 CPAP INITIATION&MGMT: CPT

## 2024-08-04 RX ORDER — FUROSEMIDE 10 MG/ML
40 INJECTION INTRAMUSCULAR; INTRAVENOUS 2 TIMES DAILY
Status: DISCONTINUED | OUTPATIENT
Start: 2024-08-04 | End: 2024-08-05 | Stop reason: HOSPADM

## 2024-08-04 RX ORDER — LORAZEPAM 2 MG/ML
0.5 INJECTION INTRAMUSCULAR ONCE
Status: COMPLETED | OUTPATIENT
Start: 2024-08-04 | End: 2024-08-04

## 2024-08-04 RX ORDER — HEPARIN SODIUM 5000 [USP'U]/ML
5000 INJECTION, SOLUTION INTRAVENOUS; SUBCUTANEOUS EVERY 8 HOURS SCHEDULED
Status: DISCONTINUED | OUTPATIENT
Start: 2024-08-04 | End: 2024-08-05 | Stop reason: HOSPADM

## 2024-08-04 RX ADMIN — ALBUMIN (HUMAN) 50 G: 0.25 INJECTION, SOLUTION INTRAVENOUS at 00:26

## 2024-08-04 RX ADMIN — LORAZEPAM 0.5 MG: 2 INJECTION INTRAMUSCULAR; INTRAVENOUS at 10:03

## 2024-08-04 RX ADMIN — INSULIN LISPRO 7 UNITS: 100 INJECTION, SOLUTION INTRAVENOUS; SUBCUTANEOUS at 16:57

## 2024-08-04 RX ADMIN — HEPARIN SODIUM 5000 UNITS: 5000 INJECTION INTRAVENOUS; SUBCUTANEOUS at 15:34

## 2024-08-04 RX ADMIN — ALBUMIN (HUMAN) 50 G: 0.25 INJECTION, SOLUTION INTRAVENOUS at 09:58

## 2024-08-04 RX ADMIN — INSULIN LISPRO 7 UNITS: 100 INJECTION, SOLUTION INTRAVENOUS; SUBCUTANEOUS at 12:44

## 2024-08-04 RX ADMIN — INSULIN LISPRO 7 UNITS: 100 INJECTION, SOLUTION INTRAVENOUS; SUBCUTANEOUS at 08:06

## 2024-08-04 RX ADMIN — FUROSEMIDE 40 MG: 10 INJECTION, SOLUTION INTRAMUSCULAR; INTRAVENOUS at 08:06

## 2024-08-04 RX ADMIN — DEXAMETHASONE SODIUM PHOSPHATE 6 MG: 10 INJECTION, SOLUTION INTRAMUSCULAR; INTRAVENOUS at 10:03

## 2024-08-04 RX ADMIN — HEPARIN SODIUM 5000 UNITS: 5000 INJECTION INTRAVENOUS; SUBCUTANEOUS at 21:32

## 2024-08-04 RX ADMIN — FUROSEMIDE 40 MG: 10 INJECTION, SOLUTION INTRAMUSCULAR; INTRAVENOUS at 17:50

## 2024-08-04 ASSESSMENT — PAIN SCALES - GENERAL
PAINLEVEL_OUTOF10: 0

## 2024-08-04 NOTE — PROCEDURES
Midline Insertion Procedure Note    Procedure: Insertion of #4 FR/18G Midline    Indications:  Poor Access    Procedure Details   Order on chart, bedside timeout performed with VIANYAK Fowler.     #4 FR/18G Midline inserted to the L compressible brachial vein using MST/US per hospital protocol.  Catheter to occupy 35% of selected vessel. Blood return:  yes    Findings:  Catheter inserted to 14 cm, with 0 cm. Exposed. Mid upper arm circumference is 34 cm.  There were no changes to vital signs. Catheter was flushed with 20 cc NS. Patient did tolerate procedure well. Call light in reach, bed in low position, bedside handoff given to VINAYAK Fowler.    Recommendations:  Midline okay to use  Midline Brochure given to patient with teaching instruction.

## 2024-08-04 NOTE — PLAN OF CARE
Problem: Chronic Conditions and Co-morbidities  Goal: Patient's chronic conditions and co-morbidity symptoms are monitored and maintained or improved  Outcome: Progressing     Problem: Discharge Planning  Goal: Discharge to home or other facility with appropriate resources  Outcome: Progressing     Problem: Skin/Tissue Integrity  Goal: Absence of new skin breakdown  Description: 1.  Monitor for areas of redness and/or skin breakdown  2.  Assess vascular access sites hourly  3.  Every 4-6 hours minimum:  Change oxygen saturation probe site  4.  Every 4-6 hours:  If on nasal continuous positive airway pressure, respiratory therapy assess nares and determine need for appliance change or resting period.  Outcome: Progressing     Problem: Safety - Adult  Goal: Free from fall injury  Outcome: Progressing     Problem: ABCDS Injury Assessment  Goal: Absence of physical injury  Outcome: Progressing     Problem: Respiratory - Adult  Goal: Achieves optimal ventilation and oxygenation  Outcome: Progressing     Problem: Cardiovascular - Adult  Goal: Maintains optimal cardiac output and hemodynamic stability  Outcome: Progressing     Problem: Skin/Tissue Integrity - Adult  Goal: Skin integrity remains intact  Outcome: Progressing  Flowsheets (Taken 8/4/2024 0800)  Skin Integrity Remains Intact:   Monitor for areas of redness and/or skin breakdown   Assess vascular access sites hourly  Goal: Incisions, wounds, or drain sites healing without S/S of infection  Outcome: Progressing  Flowsheets (Taken 8/4/2024 0800)  Incisions, Wounds, or Drain Sites Healing Without Sign and Symptoms of Infection: Initiate pressure ulcer prevention bundle as indicated  Goal: Oral mucous membranes remain intact  Outcome: Progressing  Flowsheets (Taken 8/4/2024 0800)  Oral Mucous Membranes Remain Intact: Assess oral mucosa and hygiene practices     Problem: Musculoskeletal - Adult  Goal: Return mobility to safest level of function  Outcome:

## 2024-08-05 ENCOUNTER — HOSPITAL ENCOUNTER (INPATIENT)
Facility: HOSPITAL | Age: 79
LOS: 19 days | Discharge: HOME | DRG: 870 | End: 2024-08-24
Attending: INTERNAL MEDICINE | Admitting: INTERNAL MEDICINE
Payer: MEDICARE

## 2024-08-05 ENCOUNTER — APPOINTMENT (OUTPATIENT)
Facility: HOSPITAL | Age: 79
DRG: 870 | End: 2024-08-05
Attending: INTERNAL MEDICINE
Payer: MEDICARE

## 2024-08-05 VITALS
SYSTOLIC BLOOD PRESSURE: 135 MMHG | RESPIRATION RATE: 18 BRPM | TEMPERATURE: 98.2 F | HEIGHT: 67 IN | WEIGHT: 293 LBS | DIASTOLIC BLOOD PRESSURE: 96 MMHG | HEART RATE: 94 BPM | BODY MASS INDEX: 45.99 KG/M2 | OXYGEN SATURATION: 95 %

## 2024-08-05 DIAGNOSIS — J96.01 ACUTE RESPIRATORY FAILURE WITH HYPOXIA AND HYPERCARBIA (HCC): ICD-10-CM

## 2024-08-05 DIAGNOSIS — R06.02 SHORTNESS OF BREATH: ICD-10-CM

## 2024-08-05 DIAGNOSIS — I50.9 CONGESTIVE HEART FAILURE, UNSPECIFIED HF CHRONICITY, UNSPECIFIED HEART FAILURE TYPE (HCC): Primary | ICD-10-CM

## 2024-08-05 DIAGNOSIS — J96.02 ACUTE RESPIRATORY FAILURE WITH HYPOXIA AND HYPERCARBIA (HCC): ICD-10-CM

## 2024-08-05 LAB
ANION GAP BLD CALC-SCNC: ABNORMAL MMOL/L (ref 10–20)
ANION GAP SERPL CALC-SCNC: ABNORMAL MMOL/L (ref 3–18)
APPEARANCE UR: CLEAR
APTT PPP: 35.1 SEC (ref 23–36.4)
ARTERIAL PATENCY WRIST A: ABNORMAL
ARTERIAL PATENCY WRIST A: ABNORMAL
ARTERIAL PATENCY WRIST A: YES
BACTERIA URNS QL MICRO: ABNORMAL /HPF
BASE EXCESS BLD CALC-SCNC: 16 MMOL/L
BASE EXCESS BLD CALC-SCNC: 16.1 MMOL/L
BASE EXCESS BLDA CALC-SCNC: 16.4 MMOL/L (ref 0–3)
BASOPHILS # BLD: 0 K/UL (ref 0–0.1)
BASOPHILS # BLD: 0.1 K/UL (ref 0–0.1)
BASOPHILS NFR BLD: 0 % (ref 0–2)
BASOPHILS NFR BLD: 1 % (ref 0–2)
BDY SITE: ABNORMAL
BILIRUB UR QL: NEGATIVE
BODY TEMPERATURE: 98
BUN SERPL-MCNC: 44 MG/DL (ref 7–18)
BUN/CREAT SERPL: 28 (ref 12–20)
CA-I BLD-MCNC: 1.09 MMOL/L (ref 1.12–1.32)
CA-I BLD-MCNC: 8.9 MG/DL (ref 8.5–10.1)
CHLORIDE BLD-SCNC: 101 MMOL/L (ref 98–107)
CHLORIDE SERPL-SCNC: 97 MMOL/L (ref 100–111)
CO2 BLD-SCNC: 45 MMOL/L (ref 19–24)
CO2 SERPL-SCNC: >45 MMOL/L (ref 21–32)
COHGB MFR BLD: 1.4 % (ref 1–2)
COLOR UR: ABNORMAL
CREAT BLD-MCNC: 1.36 MG/DL (ref 0.6–1.3)
CREAT SERPL-MCNC: 1.6 MG/DL (ref 0.6–1.3)
DIFFERENTIAL METHOD BLD: ABNORMAL
DIFFERENTIAL METHOD BLD: ABNORMAL
EOSINOPHIL # BLD: 0 K/UL (ref 0–0.4)
EOSINOPHIL # BLD: 0 K/UL (ref 0–0.4)
EOSINOPHIL NFR BLD: 0 % (ref 0–5)
EOSINOPHIL NFR BLD: 0 % (ref 0–5)
ERYTHROCYTE [DISTWIDTH] IN BLOOD BY AUTOMATED COUNT: 15.3 % (ref 11.6–14.5)
ERYTHROCYTE [DISTWIDTH] IN BLOOD BY AUTOMATED COUNT: 15.4 % (ref 11.6–14.5)
FIO2 ON VENT: 40 %
FIO2 ON VENT: 45 %
FLUAV RNA SPEC QL NAA+PROBE: NOT DETECTED
FLUBV RNA SPEC QL NAA+PROBE: NOT DETECTED
GAS FLOW.O2 O2 DELIVERY SYS: ABNORMAL
GAS FLOW.O2 O2 DELIVERY SYS: ABNORMAL
GAS FLOW.O2 SETTING OXYMISER: 14
GAS FLOW.O2 SETTING OXYMISER: 16 BPM
GLUCOSE BLD STRIP.AUTO-MCNC: 56 MG/DL (ref 70–110)
GLUCOSE BLD STRIP.AUTO-MCNC: 76 MG/DL (ref 70–110)
GLUCOSE BLD STRIP.AUTO-MCNC: 79 MG/DL (ref 70–110)
GLUCOSE BLD STRIP.AUTO-MCNC: 99 MG/DL (ref 70–110)
GLUCOSE BLD-MCNC: 180 MG/DL (ref 70–110)
GLUCOSE SERPL-MCNC: 114 MG/DL (ref 74–99)
GLUCOSE SERPL-MCNC: 211 MG/DL (ref 74–99)
GLUCOSE UR STRIP.AUTO-MCNC: NEGATIVE MG/DL
HCO3 BLD-SCNC: 42.7 MMOL/L (ref 22–26)
HCO3 BLD-SCNC: 43.7 MMOL/L (ref 22–26)
HCO3 BLDA-SCNC: 49 MMOL/L (ref 22–26)
HCT VFR BLD AUTO: 40.6 % (ref 35–45)
HCT VFR BLD AUTO: 42.2 % (ref 35–45)
HGB BLD-MCNC: 11.8 G/DL (ref 12–16)
HGB BLD-MCNC: 11.9 G/DL (ref 12–16)
HGB UR QL STRIP: ABNORMAL
IMM GRANULOCYTES # BLD AUTO: 0.3 K/UL (ref 0–0.04)
IMM GRANULOCYTES # BLD AUTO: 0.4 K/UL (ref 0–0.04)
IMM GRANULOCYTES NFR BLD AUTO: 2 % (ref 0–0.5)
IMM GRANULOCYTES NFR BLD AUTO: 3 % (ref 0–0.5)
INR PPP: 1.5 (ref 0.9–1.1)
IPAP/PIP: 22
KETONES UR QL STRIP.AUTO: NEGATIVE MG/DL
LACTATE BLD-SCNC: 2.12 MMOL/L (ref 0.4–2)
LEUKOCYTE ESTERASE UR QL STRIP.AUTO: ABNORMAL
LYMPHOCYTES # BLD: 1.2 K/UL (ref 0.9–3.6)
LYMPHOCYTES # BLD: 1.3 K/UL (ref 0.9–3.6)
LYMPHOCYTES NFR BLD: 10 % (ref 21–52)
LYMPHOCYTES NFR BLD: 8 % (ref 21–52)
MCH RBC QN AUTO: 28.1 PG (ref 24–34)
MCH RBC QN AUTO: 28.5 PG (ref 24–34)
MCHC RBC AUTO-ENTMCNC: 28.2 G/DL (ref 31–37)
MCHC RBC AUTO-ENTMCNC: 29.1 G/DL (ref 31–37)
MCV RBC AUTO: 101 FL (ref 78–100)
MCV RBC AUTO: 96.7 FL (ref 78–100)
METHGB MFR BLD: 0.1 % (ref 0–1.4)
MONOCYTES # BLD: 0.6 K/UL (ref 0.05–1.2)
MONOCYTES # BLD: 0.8 K/UL (ref 0.05–1.2)
MONOCYTES NFR BLD: 4 % (ref 3–10)
MONOCYTES NFR BLD: 6 % (ref 3–10)
NEUTS SEG # BLD: 10.5 K/UL (ref 1.8–8)
NEUTS SEG # BLD: 13.5 K/UL (ref 1.8–8)
NEUTS SEG NFR BLD: 80 % (ref 40–73)
NEUTS SEG NFR BLD: 87 % (ref 40–73)
NITRITE UR QL STRIP.AUTO: NEGATIVE
NRBC # BLD: 0.02 K/UL (ref 0–0.01)
NRBC # BLD: 0.03 K/UL (ref 0–0.01)
NRBC BLD-RTO: 0.1 PER 100 WBC
NRBC BLD-RTO: 0.2 PER 100 WBC
NT PRO BNP: 1877 PG/ML (ref 0–1800)
O2/TOTAL GAS SETTING VFR VENT: 100 %
OXYHGB MFR BLD: 95.7 % (ref 95–99)
PCO2 BLD: 60.1 MMHG (ref 35–45)
PCO2 BLD: 62 MMHG (ref 35–45)
PCO2 BLDA: 108 MMHG (ref 35–45)
PEEP RESPIRATORY: 10
PEEP RESPIRATORY: 6 CMH2O
PERFORMED BY:: ABNORMAL
PERFORMED BY:: ABNORMAL
PERFORMED BY:: NORMAL
PH BLD: 7.46 (ref 7.35–7.45)
PH BLD: 7.46 (ref 7.35–7.45)
PH BLDA: 7.27 (ref 7.35–7.45)
PH UR STRIP: 5.5 (ref 5–8)
PLATELET # BLD AUTO: 211 K/UL (ref 135–420)
PLATELET # BLD AUTO: 224 K/UL (ref 135–420)
PMV BLD AUTO: 10 FL (ref 9.2–11.8)
PMV BLD AUTO: 10.1 FL (ref 9.2–11.8)
PO2 BLD: 137 MMHG (ref 80–100)
PO2 BLD: 75 MMHG (ref 80–100)
PO2 BLDA: 92 MMHG (ref 80–100)
POTASSIUM BLD-SCNC: 4.5 MMOL/L (ref 3.5–5.1)
POTASSIUM SERPL-SCNC: 4.5 MMOL/L (ref 3.5–5.5)
PROT UR STRIP-MCNC: 30 MG/DL
PROTHROMBIN TIME: 18.7 SEC (ref 11.9–14.9)
RBC # BLD AUTO: 4.18 M/UL (ref 4.2–5.3)
RBC # BLD AUTO: 4.2 M/UL (ref 4.2–5.3)
RBC #/AREA URNS HPF: ABNORMAL /HPF (ref 0–2)
SAO2 % BLD: 95 %
SAO2 % BLD: 97 % (ref 95–99)
SAO2 % BLD: 99.1 % (ref 92–97)
SAO2% DEVICE SAO2% SENSOR NAME: ABNORMAL
SARS-COV-2 RNA RESP QL NAA+PROBE: DETECTED
SERVICE CMNT-IMP: ABNORMAL
SODIUM BLD-SCNC: 150 MMOL/L (ref 136–145)
SODIUM SERPL-SCNC: 145 MMOL/L (ref 136–145)
SP GR UR REFRACTOMETRY: 1.01 (ref 1–1.03)
SPECIMEN SITE: ABNORMAL
SPECIMEN SITE: ABNORMAL
SPECIMEN TYPE: ABNORMAL
TOTAL RATE: 19
TROPONIN I SERPL HS-MCNC: 30 NG/L (ref 0–54)
UROBILINOGEN UR QL STRIP.AUTO: 1 EU/DL (ref 0.2–1)
VENTILATION MODE VENT: ABNORMAL
VT SETTING VENT: 400 ML
VT SETTING VENT: 440
WBC # BLD AUTO: 13.1 K/UL (ref 4.6–13.2)
WBC # BLD AUTO: 15.6 K/UL (ref 4.6–13.2)
WBC URNS QL MICRO: ABNORMAL /HPF (ref 0–4)

## 2024-08-05 PROCEDURE — 0BH17EZ INSERTION OF ENDOTRACHEAL AIRWAY INTO TRACHEA, VIA NATURAL OR ARTIFICIAL OPENING: ICD-10-PCS | Performed by: INTERNAL MEDICINE

## 2024-08-05 PROCEDURE — 6370000000 HC RX 637 (ALT 250 FOR IP): Performed by: INTERNAL MEDICINE

## 2024-08-05 PROCEDURE — 71045 X-RAY EXAM CHEST 1 VIEW: CPT

## 2024-08-05 PROCEDURE — 2000000000 HC ICU R&B

## 2024-08-05 PROCEDURE — 94640 AIRWAY INHALATION TREATMENT: CPT

## 2024-08-05 PROCEDURE — 2500000003 HC RX 250 WO HCPCS: Performed by: PHYSICIAN ASSISTANT

## 2024-08-05 PROCEDURE — 99291 CRITICAL CARE FIRST HOUR: CPT | Performed by: INTERNAL MEDICINE

## 2024-08-05 PROCEDURE — 82330 ASSAY OF CALCIUM: CPT

## 2024-08-05 PROCEDURE — 5A09357 ASSISTANCE WITH RESPIRATORY VENTILATION, LESS THAN 24 CONSECUTIVE HOURS, CONTINUOUS POSITIVE AIRWAY PRESSURE: ICD-10-PCS | Performed by: INTERNAL MEDICINE

## 2024-08-05 PROCEDURE — 87205 SMEAR GRAM STAIN: CPT

## 2024-08-05 PROCEDURE — 87040 BLOOD CULTURE FOR BACTERIA: CPT

## 2024-08-05 PROCEDURE — 6360000002 HC RX W HCPCS: Performed by: INTERNAL MEDICINE

## 2024-08-05 PROCEDURE — 80048 BASIC METABOLIC PNL TOTAL CA: CPT

## 2024-08-05 PROCEDURE — 82962 GLUCOSE BLOOD TEST: CPT

## 2024-08-05 PROCEDURE — 84484 ASSAY OF TROPONIN QUANT: CPT

## 2024-08-05 PROCEDURE — 2580000003 HC RX 258: Performed by: PHYSICIAN ASSISTANT

## 2024-08-05 PROCEDURE — 31500 INSERT EMERGENCY AIRWAY: CPT | Performed by: INTERNAL MEDICINE

## 2024-08-05 PROCEDURE — 85610 PROTHROMBIN TIME: CPT

## 2024-08-05 PROCEDURE — 36600 WITHDRAWAL OF ARTERIAL BLOOD: CPT

## 2024-08-05 PROCEDURE — 36415 COLL VENOUS BLD VENIPUNCTURE: CPT

## 2024-08-05 PROCEDURE — APPSS60 APP SPLIT SHARED TIME 46-60 MINUTES: Performed by: PHYSICIAN ASSISTANT

## 2024-08-05 PROCEDURE — 85730 THROMBOPLASTIN TIME PARTIAL: CPT

## 2024-08-05 PROCEDURE — 6360000002 HC RX W HCPCS: Performed by: PHYSICIAN ASSISTANT

## 2024-08-05 PROCEDURE — 82947 ASSAY GLUCOSE BLOOD QUANT: CPT

## 2024-08-05 PROCEDURE — 6360000002 HC RX W HCPCS: Performed by: NURSE PRACTITIONER

## 2024-08-05 PROCEDURE — 94002 VENT MGMT INPAT INIT DAY: CPT

## 2024-08-05 PROCEDURE — 85014 HEMATOCRIT: CPT

## 2024-08-05 PROCEDURE — 74018 RADEX ABDOMEN 1 VIEW: CPT

## 2024-08-05 PROCEDURE — 2500000003 HC RX 250 WO HCPCS: Performed by: INTERNAL MEDICINE

## 2024-08-05 PROCEDURE — 85025 COMPLETE CBC W/AUTO DIFF WBC: CPT

## 2024-08-05 PROCEDURE — 94660 CPAP INITIATION&MGMT: CPT

## 2024-08-05 PROCEDURE — 83880 ASSAY OF NATRIURETIC PEPTIDE: CPT

## 2024-08-05 PROCEDURE — 87070 CULTURE OTHR SPECIMN AEROBIC: CPT

## 2024-08-05 PROCEDURE — 84295 ASSAY OF SERUM SODIUM: CPT

## 2024-08-05 PROCEDURE — 87636 SARSCOV2 & INF A&B AMP PRB: CPT

## 2024-08-05 PROCEDURE — 83605 ASSAY OF LACTIC ACID: CPT

## 2024-08-05 PROCEDURE — 31500 INSERT EMERGENCY AIRWAY: CPT

## 2024-08-05 PROCEDURE — 2580000003 HC RX 258: Performed by: INTERNAL MEDICINE

## 2024-08-05 PROCEDURE — 84132 ASSAY OF SERUM POTASSIUM: CPT

## 2024-08-05 PROCEDURE — 2700000000 HC OXYGEN THERAPY PER DAY

## 2024-08-05 PROCEDURE — 6360000002 HC RX W HCPCS

## 2024-08-05 PROCEDURE — 5A0935A ASSISTANCE WITH RESPIRATORY VENTILATION, LESS THAN 24 CONSECUTIVE HOURS, HIGH NASAL FLOW/VELOCITY: ICD-10-PCS | Performed by: INTERNAL MEDICINE

## 2024-08-05 PROCEDURE — 81001 URINALYSIS AUTO W/SCOPE: CPT

## 2024-08-05 PROCEDURE — 2580000003 HC RX 258: Performed by: NURSE PRACTITIONER

## 2024-08-05 PROCEDURE — 82803 BLOOD GASES ANY COMBINATION: CPT

## 2024-08-05 PROCEDURE — 94761 N-INVAS EAR/PLS OXIMETRY MLT: CPT

## 2024-08-05 RX ORDER — ENOXAPARIN SODIUM 100 MG/ML
40 INJECTION SUBCUTANEOUS DAILY
Status: DISCONTINUED | OUTPATIENT
Start: 2024-08-05 | End: 2024-08-05

## 2024-08-05 RX ORDER — IPRATROPIUM BROMIDE AND ALBUTEROL SULFATE 2.5; .5 MG/3ML; MG/3ML
1 SOLUTION RESPIRATORY (INHALATION)
Status: DISCONTINUED | OUTPATIENT
Start: 2024-08-05 | End: 2024-08-09

## 2024-08-05 RX ORDER — PROPOFOL 10 MG/ML
5-50 INJECTION, EMULSION INTRAVENOUS CONTINUOUS
Status: DISCONTINUED | OUTPATIENT
Start: 2024-08-05 | End: 2024-08-06

## 2024-08-05 RX ORDER — POTASSIUM CHLORIDE 29.8 MG/ML
20 INJECTION INTRAVENOUS PRN
Status: DISCONTINUED | OUTPATIENT
Start: 2024-08-05 | End: 2024-08-24 | Stop reason: HOSPADM

## 2024-08-05 RX ORDER — FENTANYL CITRATE-0.9 % NACL/PF 10 MCG/ML
25-200 PLASTIC BAG, INJECTION (ML) INTRAVENOUS CONTINUOUS
Status: DISCONTINUED | OUTPATIENT
Start: 2024-08-05 | End: 2024-08-09

## 2024-08-05 RX ORDER — ACETAMINOPHEN 325 MG/1
650 TABLET ORAL EVERY 6 HOURS PRN
Status: DISCONTINUED | OUTPATIENT
Start: 2024-08-05 | End: 2024-08-24 | Stop reason: HOSPADM

## 2024-08-05 RX ORDER — SODIUM CHLORIDE 0.9 % (FLUSH) 0.9 %
5-40 SYRINGE (ML) INJECTION EVERY 12 HOURS SCHEDULED
Status: DISCONTINUED | OUTPATIENT
Start: 2024-08-05 | End: 2024-08-24 | Stop reason: HOSPADM

## 2024-08-05 RX ORDER — HYDRALAZINE HYDROCHLORIDE 20 MG/ML
10 INJECTION INTRAMUSCULAR; INTRAVENOUS EVERY 6 HOURS PRN
Status: DISCONTINUED | OUTPATIENT
Start: 2024-08-05 | End: 2024-08-05 | Stop reason: HOSPADM

## 2024-08-05 RX ORDER — ALBUTEROL SULFATE 0.83 MG/ML
2.5 SOLUTION RESPIRATORY (INHALATION) EVERY 4 HOURS PRN
Status: DISCONTINUED | OUTPATIENT
Start: 2024-08-05 | End: 2024-08-17

## 2024-08-05 RX ORDER — SODIUM CHLORIDE 0.9 % (FLUSH) 0.9 %
5-40 SYRINGE (ML) INJECTION PRN
Status: DISCONTINUED | OUTPATIENT
Start: 2024-08-05 | End: 2024-08-24 | Stop reason: HOSPADM

## 2024-08-05 RX ORDER — FENTANYL CITRATE 50 UG/ML
INJECTION, SOLUTION INTRAMUSCULAR; INTRAVENOUS
Status: COMPLETED
Start: 2024-08-05 | End: 2024-08-05

## 2024-08-05 RX ORDER — HEPARIN SODIUM 1000 [USP'U]/ML
10000 INJECTION, SOLUTION INTRAVENOUS; SUBCUTANEOUS PRN
Status: DISCONTINUED | OUTPATIENT
Start: 2024-08-05 | End: 2024-08-16

## 2024-08-05 RX ORDER — MAGNESIUM SULFATE IN WATER 40 MG/ML
2000 INJECTION, SOLUTION INTRAVENOUS PRN
Status: DISCONTINUED | OUTPATIENT
Start: 2024-08-05 | End: 2024-08-24 | Stop reason: HOSPADM

## 2024-08-05 RX ORDER — IPRATROPIUM BROMIDE AND ALBUTEROL SULFATE 2.5; .5 MG/3ML; MG/3ML
1 SOLUTION RESPIRATORY (INHALATION)
Status: DISCONTINUED | OUTPATIENT
Start: 2024-08-05 | End: 2024-08-05

## 2024-08-05 RX ORDER — ACETAMINOPHEN 650 MG/1
650 SUPPOSITORY RECTAL EVERY 6 HOURS PRN
Status: DISCONTINUED | OUTPATIENT
Start: 2024-08-05 | End: 2024-08-24 | Stop reason: HOSPADM

## 2024-08-05 RX ORDER — PROPOFOL 10 MG/ML
5-50 INJECTION, EMULSION INTRAVENOUS ONCE
Status: DISCONTINUED | OUTPATIENT
Start: 2024-08-05 | End: 2024-08-06

## 2024-08-05 RX ORDER — HEPARIN SODIUM 5000 [USP'U]/ML
5000 INJECTION, SOLUTION INTRAVENOUS; SUBCUTANEOUS EVERY 8 HOURS SCHEDULED
Status: DISCONTINUED | OUTPATIENT
Start: 2024-08-05 | End: 2024-08-05

## 2024-08-05 RX ORDER — POTASSIUM CHLORIDE 7.45 MG/ML
10 INJECTION INTRAVENOUS PRN
Status: DISCONTINUED | OUTPATIENT
Start: 2024-08-05 | End: 2024-08-24 | Stop reason: HOSPADM

## 2024-08-05 RX ORDER — HEPARIN SODIUM 1000 [USP'U]/ML
5000 INJECTION, SOLUTION INTRAVENOUS; SUBCUTANEOUS PRN
Status: DISCONTINUED | OUTPATIENT
Start: 2024-08-05 | End: 2024-08-16

## 2024-08-05 RX ORDER — NOREPINEPHRINE BITARTRATE 0.06 MG/ML
INJECTION, SOLUTION INTRAVENOUS
Status: DISCONTINUED
Start: 2024-08-05 | End: 2024-08-06

## 2024-08-05 RX ORDER — ATROPINE SULFATE 0.1 MG/ML
INJECTION INTRAVENOUS
Status: DISCONTINUED
Start: 2024-08-05 | End: 2024-08-06

## 2024-08-05 RX ORDER — POLYETHYLENE GLYCOL 3350 17 G/17G
17 POWDER, FOR SOLUTION ORAL DAILY PRN
Status: DISCONTINUED | OUTPATIENT
Start: 2024-08-05 | End: 2024-08-24 | Stop reason: HOSPADM

## 2024-08-05 RX ORDER — LORAZEPAM 2 MG/ML
0.5 INJECTION INTRAMUSCULAR EVERY 6 HOURS PRN
Status: DISCONTINUED | OUTPATIENT
Start: 2024-08-05 | End: 2024-08-05 | Stop reason: HOSPADM

## 2024-08-05 RX ORDER — PROPOFOL 10 MG/ML
INJECTION, EMULSION INTRAVENOUS
Status: COMPLETED
Start: 2024-08-05 | End: 2024-08-05

## 2024-08-05 RX ORDER — HEPARIN SODIUM 10000 [USP'U]/100ML
5-30 INJECTION, SOLUTION INTRAVENOUS CONTINUOUS
Status: DISCONTINUED | OUTPATIENT
Start: 2024-08-05 | End: 2024-08-16

## 2024-08-05 RX ORDER — IPRATROPIUM BROMIDE AND ALBUTEROL SULFATE 2.5; .5 MG/3ML; MG/3ML
1 SOLUTION RESPIRATORY (INHALATION)
Status: DISCONTINUED | OUTPATIENT
Start: 2024-08-05 | End: 2024-08-05 | Stop reason: HOSPADM

## 2024-08-05 RX ORDER — SODIUM CHLORIDE 9 MG/ML
INJECTION, SOLUTION INTRAVENOUS PRN
Status: DISCONTINUED | OUTPATIENT
Start: 2024-08-05 | End: 2024-08-24 | Stop reason: HOSPADM

## 2024-08-05 RX ADMIN — HYDRALAZINE HYDROCHLORIDE 10 MG: 20 INJECTION, SOLUTION INTRAMUSCULAR; INTRAVENOUS at 14:23

## 2024-08-05 RX ADMIN — PROPOFOL 50 MCG/KG/MIN: 10 INJECTION, EMULSION INTRAVENOUS at 21:18

## 2024-08-05 RX ADMIN — DEXAMETHASONE SODIUM PHOSPHATE 6 MG: 10 INJECTION, SOLUTION INTRAMUSCULAR; INTRAVENOUS at 09:56

## 2024-08-05 RX ADMIN — FENTANYL CITRATE 100 MCG: 50 INJECTION INTRAMUSCULAR; INTRAVENOUS at 19:07

## 2024-08-05 RX ADMIN — HEPARIN SODIUM 14 UNITS/KG/HR: 10000 INJECTION, SOLUTION INTRAVENOUS at 22:16

## 2024-08-05 RX ADMIN — SODIUM CHLORIDE, PRESERVATIVE FREE 10 ML: 5 INJECTION INTRAVENOUS at 23:36

## 2024-08-05 RX ADMIN — IPRATROPIUM BROMIDE AND ALBUTEROL SULFATE 1 DOSE: .5; 3 SOLUTION RESPIRATORY (INHALATION) at 11:50

## 2024-08-05 RX ADMIN — HEPARIN SODIUM 5000 UNITS: 5000 INJECTION INTRAVENOUS; SUBCUTANEOUS at 06:46

## 2024-08-05 RX ADMIN — IPRATROPIUM BROMIDE AND ALBUTEROL SULFATE 1 DOSE: .5; 3 SOLUTION RESPIRATORY (INHALATION) at 20:18

## 2024-08-05 RX ADMIN — PROPOFOL 20 MCG/KG/MIN: 10 INJECTION, EMULSION INTRAVENOUS at 19:12

## 2024-08-05 RX ADMIN — FAMOTIDINE 20 MG: 10 INJECTION, SOLUTION INTRAVENOUS at 22:08

## 2024-08-05 RX ADMIN — LORAZEPAM 0.5 MG: 2 INJECTION INTRAMUSCULAR; INTRAVENOUS at 08:05

## 2024-08-05 RX ADMIN — LORAZEPAM 0.5 MG: 2 INJECTION INTRAMUSCULAR; INTRAVENOUS at 14:06

## 2024-08-05 RX ADMIN — PROPOFOL 50 MCG/KG/MIN: 10 INJECTION, EMULSION INTRAVENOUS at 23:40

## 2024-08-05 RX ADMIN — PIPERACILLIN AND TAZOBACTAM 4500 MG: 4; .5 INJECTION, POWDER, FOR SOLUTION INTRAVENOUS at 20:44

## 2024-08-05 RX ADMIN — DEXTROSE MONOHYDRATE 125 ML: 100 INJECTION, SOLUTION INTRAVENOUS at 04:05

## 2024-08-05 RX ADMIN — VANCOMYCIN HYDROCHLORIDE 2500 MG: 1 INJECTION, POWDER, LYOPHILIZED, FOR SOLUTION INTRAVENOUS at 23:36

## 2024-08-05 RX ADMIN — Medication 50 MCG/HR: at 20:02

## 2024-08-05 ASSESSMENT — PAIN SCALES - GENERAL
PAINLEVEL_OUTOF10: 0

## 2024-08-05 ASSESSMENT — PULMONARY FUNCTION TESTS: PIF_VALUE: 29

## 2024-08-05 NOTE — PROGRESS NOTES
PATIENT CAME FROM [facility] ON BIPAP FOR RESP FAILURE. UPON ARRIVAL PATIENT SWITCHED TO V60 ON RATE 8, IPAP 24, PEEP 12, FI02 40%. SP02 98. PATIENT APPEARING COMFORTABLE. WILL FOLLOW-UP WITH ABG WITHIN AN HOUR   08/05/24 1652   NIV Type   $NIV $Daily Charge   Ventilator ID [id number]   NIV Started/Stopped On   Equipment Type V-60   Mode Bilevel   Mask Type Full face mask   Mask Size Large   Assessment   Pulse (!) 103   Respirations 19   SpO2 97 %   Level of Consciousness 0   Comfort Level Fair   Using Accessory Muscles No   Skin Protection for O2 Device Yes   Breath Sounds   Breath Sounds Bilateral Coarse crackles   Right Upper Lobe Coarse crackles   Right Middle Lobe Coarse crackles   Right Lower Lobe Coarse crackles   Left Upper Lobe Coarse crackles   Left Lower Lobe Coarse crackles   Settings/Measurements   PIP Observed 24 cm H20   IPAP 24 cmH20   CPAP/EPAP 12 cmH2O   Vt (Measured) 539 mL   Rate Ordered 8   FiO2  40 %   I Time/ I Time % 0.9 s   Minute Volume (L/min) 12.5 Liters   Mask Leak (lpm) 74 lpm  (MASK TIGHTLY SECURE. GETTING VOLUMES)   Patient's Home Machine No   Alarm Settings   Alarms On Y   Low Pressure (cmH2O) 5 cmH2O   High Pressure (cmH2O) 40 cmH2O   Apnea (secs) 20 secs   RR Low (bpm) 8   RR High (bpm) 40 br/min

## 2024-08-05 NOTE — PROCEDURES
PROCEDURE NOTE  Date: 8/5/2024   Name: Jasmin Pacheco  YOB: 1945    Intubation    Date/Time: 8/5/2024 7:14 PM    Performed by: Haris Smart DO  Authorized by: Haris Smart DO  Consent: The procedure was performed in an emergent situation.  Patient identity confirmed: arm band  Time out: Immediately prior to procedure a \"time out\" was called to verify the correct patient, procedure, equipment, support staff and site/side marked as required.  Indications: respiratory failure (Patient hypoxic with concomittant Bradycardia with HR in the 30-50s.)  Intubation method: video-assisted  Patient status: unconscious  Preoxygenation: BVM  Pretreatment medications: none  Sedatives: etomidate  Paralytic: rocuronium  Laryngoscope size: Mac 4  Tube size: 7.5 mm  Tube type: cuffed  Number of attempts: 1  Ventilation between attempts: N/A.  Cricoid pressure: no  Cords visualized: yes  Post-procedure assessment: chest rise and CO2 detector  Breath sounds: equal  Cuff inflated: yes  ETT to lip: 23 cm  Tube secured with: ETT miller  Chest x-ray findings: CXR pending.  Patient tolerance: patient tolerated the procedure well with no immediate complications  Comments: Repeat look with Glidescope to confirm placement.   Anesthesia present at bedside for backup.         Haris Smart DO   08/05/24  Pulmonary, Critical Care Medicine  [facility]

## 2024-08-05 NOTE — PROGRESS NOTES
Hospitalist Progress Note             Date of Service:  2024  NAME:  Jasmin Pacheco  :  1945  MRN:  [id number]    Hpi  - pt feels better today, did need bipap last night, weening off 02 tdoay, potential for dc in am    Assessment / Plan:  Acute on chronic respiratory failure with hypoxia and hypercapnia in the setting of COVID and pneumonia: afebrile, no leukocytosis  --admit to ICU with telemetry monitoring, bipap, isolation precautions  --confirmed on COVID swab  --CXR Bibasilar atelectasis versus pneumonia. Mild interstitial pulmonary edema, Increased moderate bibasilar opacities.  --supplemental O2, keep O2 saturation >89%, albuterol via MDI  --Procalcitonin <0.05,  lactic acid 1.8, trend lactic acid  --Decadron 6 mg IV daily  --Azithromycin IV daily  --Tylenol PRN for fever  --blood cultures ngtd  --dc zosyn  --daily CBC and BMP      Acute on Chronic diastolic heart failure: currently looks dry, bump in cr today, hold off on further lasix at this time, dc fluid restriction  --exacerbated by pt running of of lasix, COVID and pneumonia, BLE edema and pulm edema on CXR  --received Lasix 60mg IV in the ED, continue Lasix 40 QD  --  --last ECHO 2023,   --Monitor I&O and daily weights,  --Telemetry monitoring  --EKG ST with PVCs 105  --serial troponin 15-->  --Daily BMP (closely monitor K+ and Creatinine);    --cardiac consult      Acute Kidney Injury on CKD III: Baseline ~Cr 1.5. Admission Cr 1.61 current 2.0  --Strict I/O. Watch for fluid overload  --Renal panel daily. Monitor K+ and Phos levels closely  --Avoid nephrotoxic agents/contrast.   --No NSAID's. No ACEI/ ARB's. No Diuretics.   --Avoid drastic lowering of blood pressure in order to maintain a good renal perfusion      Hypertension:  --Chronic currently nornotensive  --Continue lasix  --PRN Hydralazine SBP greater than 160  --Monitor BP   
   07/31/24 0759   NIV Type   NIV Started/Stopped Off  (placed on 02 at 2 liters via NC she does wear 1 liter days and 2 liters bleed into cpap per report. No resp distress. sats 94-95%)   Assessment   Pulse 86   Respirations 18   SpO2 94 %   Breath Sounds   Breath Sounds Bilateral Clear;Diminished   Settings/Measurements   O2 Flow Rate (L/min) 2 L/min       
-1850 Received care of pt from off going nurse.     -2109 Pt c/o itching benadryl 25 mg IV given.  , Lantus 22 units given sub-q.    -2118 PM Assessment completed.  A&OX4.  Resp even and non-labored. Lung sound rhonchi in upper lobes, clear and diminished in bases. O2 @ 3 lpm via n/c.  SATS 97%.  Skin warm and dry.  Peeling noted to right lateral side.  +2 pitting edema to BLE. Purwick intact connected to suction, patent frederick colored urine.  Pt given bedtime snack of chicken salad sandwich.    -2250 RT tech into put pt back BIPAP.  BIPAP settings I-177, E-12, rt-16, FIO2 @30%.  SATS 95%.  CBWR, bed in lowest position.    -0030 Pt tolerating BIPAP without any difficulty.  CBWR, bed in lowest position.    -0215 Pt resting quietly in bed with eyes closed.  NAD noted.    -0620 Bedside shift change report given to KELSEY Costa RN (oncoming nurse) by LUIS CARLOS Shaw RN (offgoing nurse). Report included the following information Intake/Output, MAR, Recent Results, and Cardiac Rhythm NSR .    
0700 -  Accepted care of pt from RICARDA Savage RN. Reported that pt has had increased SOB. Rt at bedside. Lab in to draw blood.  0730 - Pt remains on BIPAP will not tolerate coming off. Will discuss plan of care with Dr. Winston. Vascular Access notified of need for line placement. ETA 2 hours.    0815 -  Lasix 40mg IVP given and Mead inserted. Brief changed and CHG bath complete. Will continue to monitor.   0903 -  PT's BiPAP disconnected and pt unable to maintain sats. Bradycardic within seconds down to 40's. Once Bipap was reconnected pt's HR and SPO2 came back into her normal range. Pt appears anxious. Rt at bedside.   0930-  Pt voiced she can't breath. O2 sats are 95%. Tachypnia noted. Asked pt if she needs something for relaxation and she agreed. Dr. Winston made aware.   1010 -  Medicated with Ativan 0.5mg IV for relaxation. Will hold Eliquis at this time since pt can't maintain being off BIPAP. Will start on Heparin per MD orders  1030 -  pt maintaining on BIPAP. Seems more comfortable s/p Ativan.   1100 -  No change will continue to monitor.  1230 -  Still awaiting Vascular Line service. Pt remains stable   1250 -  Vascular access here for line placement.   1400 -  Pt turned and repositioned. Stable  1530 -  Pt's status remains unchanged. Will continue to monitor.   1700 -  Pt repositioned. O2 sats much improved from earlier this am. NO labored breathing and sats 95-98% on BIPAP. Will continue to monitor.   1720 -  Family at bedside.   1800 -  Pt turned and repositioned on left side  1855 -  Report to RICARDA Savage, RN. Pt stable.   
0700- care of the patient assumed  0745- breakfast provided, repositioned  0950- rounding on patient, repositioned, air adjusted  1130- lunch provided, patient repositioned  1400- rounding on patient, no needs, repositioned  1550- linens changes, spencer care, gown changed  1630- dinner provided repositioned  
0700-Beside shift report received from KELSEY Arias RN. Assumed care of patient.     0832-AM medications given- Education   Provided. Pt tolerated well.     0845-Breakfast tray provided. Pt tolerated well.    0945-ICU rounds being performed at this time with medical team present.    1115-Pt declined lunch at this time.    1300-Pt resting. VSS.     1415-Rounded on patient. Brief clean and dry. Repositioned. No needs at this time. CBWR.    1455-Off going bedside shift report given to SHIRLEY Elizondo RN.     
0700-Beside shift report received from LUIS CARLOS Wu RN. Assumed care of patient.     0715-Breakfast tray provided.    0752-AM medications given- Education Provided. Pt tolerated well.    1000-Dr. Winston at bedside assessing pt.     1100-Lunch tray provided.    1400-Pt resting. VSS. No needs at this time.    1530-Bath complete. Linen change. Pt tolerated well.     1600-Supper tray provided.    1700-Pt c/o SOB. 94%-3L NC. RT called. Patient placed on Bipap.     1730-Family at bedside.    1855-Off going bedside shift report given to VINAYAK Hernandez.     
07:15- Bipap remains on. BGM 79. Pt remains NPO d/t bipap, will continue to monitor. RT in room with pt.     07:30- Pt on hiflo NC.     08:00- Spent 30 min in room with pt providing emotional support, breathing exercises, oral care, etc. Pt continues to holler \"I can't breath.\" Explained to pt work of breathing worse when talking/hollering. Pt states understanding then continues to holler \"help me!\". MD notified. Orders received. PRN med given, see MAR.     08:15- Pulse ox and cardiac leads replaced    08:25- COVID test collected and sent to lab. Pt placed back on bipap.     09:00- Pulse ox replaced.    09:30- Pulse ox and cardiac leads replaced. Pt removed gown, gown replaced.     10:00- Pulse ox replaced. Pt removed gown, gown replaced. Nephrology in room with pt to assess.     10:05- Pt placed on droplet plus isolation precautions per test results. Pt bipap repositioned.    10:15- Update provided to pt onel.     10:35- Ua collected and sent to lab per MD orders.     11:40- BGM 99. Pt remains wearing bipap. VSS. Pulse ox replaced.     12:00- RT in room with pt.     12:15- Pulse ox replaced. O2 sat = 99%.     12:30- Pt kicked both legs off bed. Pt repositioned. Pulse ox replaced. O2 sat = 99%.     12:35- Pulse ox replaced. O2 sat = 99%.     12:40- Pulse ox and cardiac leads replaced.    12:50- Report called to Jose Roberto. Onel Singhrell notified of p/u.     13:20- Pulse ox and cardiac leads replaced. All pt belongings packed.     14:15- Transport here to p/u pt for [facility].  PRN meds given, see MAR.    14:45- [facility] ICU and pt onel Singhrell notified of p/u.    
0900- morning meds given, insulin wasted.  
1500 -  Accepted care of pt from IFEOMA Genao RN. VSS. Pt resting in bed with eyes closed.   1620  - Assisted PT to sit up to eat dinner. Stable   1640 - Pt's sats 85-88% on 2LNC after the completion of dinner. Increased work of breathing.  Assisted pt with repositioning and sitting up. Increased O2 to 3LNC. Will continue to monitor.   1700 -  Pt is 95% on 3LNC. Decreased work of breathing noted.    1730 -  95% on 3LNC. Pt repositioned. 400ml of urine out.   1800 -  Son at bedside. Discussed meds to bed program to assist pt with discharge meds. Patient and son are interested. [facility] pharmacist called and made aware.   1900 -  Report to FLORIAN Ayala RN  
1850 - Assumed care of pt, shift report given    1935 - VSS. Assessment completed. Pt A&OX4. LS - Inspiratory wheezing and diminished throughout all lobes. Skin is very dry and flaky however no wounds noted. +2 pitting edema noted to BLE.     2045 - Zosyn completed. Jeannie care and clean pure wick provided. Repositioned for pressure reduction and comfort. HS medication and snack given. , SSI held. Lotion applied to BUE for c/o itchiness.     2230 - Pt de sating, lowest 84%. RT aware and at bedside placing pt on BIPAP. Pt repositioned for pressure reduction and comfort.     0035 - VSS. Zosyn hung per orders. Repositioned for pressure reduction and comfort. Lotion applied to back for c/o itchiness.     0330 - Lab at bedside for blood draw. Pt c/o itching, very anxious writhing in bed.  Spoke to MD who gave telephone order for Benadryl 25mg IV Q6HPRN, RBV and order entered     0425 - PRN Benadry given. Complete bed bath and linen change provided, lotion applied. Pure wick changed (output 200 ml's yellow urine) Zithromax hung per orders.     0438 - MD made aware of WBC 5.4->23.9    0530 - Zithromax completed. Pt resting in bed, states she is feeling better at this time.     0639 - . Repositioned for pressure reduction and comfort. Pt denies any needs at this time. CBWR     0700 - Report given to KELSEY Arias RN       
1900 Bedside shift change report given to NAMITA Wu RN (oncoming nurse) by KELSEY Parrish RN (offgoing nurse). Report included the following information Nurse Handoff Report, Intake/Output, MAR, Recent Results, Med Rec Status, and Cardiac Rhythm 1st AVB  . Pt on droplet plus precaution.    1930  PM Assessment completed. A&OX4. Resp even and non-labored. Lung sound rhonchi in upper lobes, clear and diminished in bases. O2 @ 2 lpm via n/c. SATS 97%. Skin warm and dry.  Purwick iinplace connected to suction, patent frederick colored urine.     2039 Pt given bedtime snack of chicken salad sandwich Benadryl 25mg IV given for c/o itching.     2325 Resp tech and place pt on bipap.    0200 Patient resting quietly in bed with eyes closed. Resp easy and nonlabored. No distress noted.     0422  Pt rang call bell requesting something for itching. Benadryl 25mg IV given. Blood pressure 116/76, pulse 75, temperature 97.4 °F (36.3 °C), temperature source Axillary, resp. rate 21, height 1.702 m (5' 7\"), weight (!) 143.8 kg (317 lb), SpO2 99 %.    0500 Resp tech in and took pt off bipap and put on nasal 02 @ 3LPM.     0700 Shift report given to oncoming nurse.          
2105: Notified Lidia Beverly NP that patient blood sugar was 75. Patient NPO throughout the day because she was unable to tolerate being off the bipap. Orders to hold the night time lantus dose.     0400: spot sugar check, patient BG was 56. 10% dextrose 125cc given, recheck BG went up to 76.    0530 Lab called with critical results CO2 >45 APRN Fallon notified, new orders placed  
Attempted to remove patient from BIPAP this morning.  Immediately after removal of mask patient began stating she \"couldn't breathe\".  Remained in room with patient and attempted to calm her just to make sure it wasn't anxiety.  O2 increased to 6 lpm.  Spo2 dropped to 67% even with further increase of flow.  Accessory muscle usage noted and patient appeared very uncomfortable.  Placed back to BIPAP after only being off for 5 minutes.  FIO2 increased to 100% and it took several minutes for SPO2 increase back to the 80's.  FIO2 later titrated to 55% and remains there.  Current SPO2 in low 90's.  IPAP increased to 22 cmH20 to maintain adequate tidal volume.         08/04/24 0725   NIV Type   NIV Started/Stopped On   Equipment Type V60   Mode Bilevel   Mask Type Full face mask   Mask Size Medium   Assessment   Pulse 100   Heart Rate Source Monitor   Respirations 28   SpO2 94 %   Level of Consciousness 0   Comfort Level Good   Using Accessory Muscles Yes   Mask Compliance Good   Breath Sounds   Respiratory Pattern Other (Comment)  (labored)   Breath Sounds Bilateral Diminished;Scattered wheezing   Settings/Measurements   PIP Observed 24 cm H20   IPAP 22 cmH20   CPAP/EPAP 10 cmH2O   Vt (Measured) 553 mL   Rate Ordered 14   FiO2  55 %   I Time/ I Time % 0.9 s   Minute Volume (L/min) 15.4 Liters   Mask Leak (lpm) 33 lpm   Patient's Home Machine No   Alarm Settings   Alarms On Y   Low Pressure (cmH2O) 5 cmH2O   High Pressure (cmH2O) 35 cmH2O   Apnea (secs) 20 secs   RR Low (bpm) 7   RR High (bpm) 40 br/min   Oxygen Therapy/Pulse Ox   O2 Therapy Oxygen   O2 Device PAP (positive airway pressure)  (22/10, R14, 55%)   Humidification Source Heated wire   Pulse Oximeter Device Mode Continuous   Pulse Oximeter Device Location Left;Finger       
Due to patients current isolation status and patient having COVID-19, pharmacy is unable to do a medication reconciliation at this time.      A medication reconciliation was signed off by Estefanía Arias RN on 7/31/2024 at 2:52 AM - NURSING UPON ADMISSION.     Thanks.   Susan Nixon, PharmD  
Exuderm applied to bridge of nose underneath BIPAP mask to avoid breakdown since patient is requiring long term use.       08/04/24 0809   Assessment   Skin Protection for O2 Device Yes   Location Nose   Intervention(s) Skin Barrier       
High flow started to give break to pt who has worn bipap for 48 hours, she had an old breakdown on nose, still no damage noted and was covered with duoderm material. High flow placed at 60L and 70% o2 to maintain sats 92-94%, she is somewhat aggitated and calling for help, head of bed was raised per pt request, She is uncomfortable but Breath rate was 22-24, sats 92% and HR 80.   
INTERNAL MEDICINE PROGRESS NOTE      Patient: Jasmin Pacheco   YOB: 1945   MRN: [id number]      Hospital course / Assessment and Plans   Principle Problems:  Acute on chronic respiratory failure with hypoxia and hypercapnia   COVID pneumonia:   --CXR Bibasilar atelectasis versus pneumonia. Mild interstitial pulmonary edema, Increased moderate bibasilar opacities.  --Procalcitonin <0.05,  lactic acid 1.8, trend lactic acid, afebrile, no leukocytosis  --admit to ICU with telemetry monitoring, bipap, isolation precautions  --supplemental O2, keep O2 saturation >89%, albuterol via MDI  --Decadron 6 mg IV daily, Azithromycin IV daily, Tylenol PRN for fever  --blood cultures ngtd, d/yasmin  zosyn  --Steroid induced leukocytosis   --off BiPAP, oxygenation improved on NC , Vitals better today   Mild Acute on Chronic diastolic heart failure: --  --exacerbated by pt running of of lasix, COVID and pneumonia, BLE edema and pulm edema on CXR  --received Lasix 60mg IV in the ED, hold Lasix for now due to renal failure  --Monitor I&O and daily weights,Telemetry monitoring  --EKG ST with PVCs 105, serial troponin normal   --cardiac consult   Acute Kidney Injury on CKD IIIb    - Baseline ~Cr 1.5. Admission Cr 1.6 --> 2.0 then back to 1.6  --Strict I/O. Watch for fluid overload, Avoid nephrotoxic agents/contrast.   --Avoid drastic lowering of blood pressure in order to maintain a good renal perfusion   --Nephrology consult   Low Anion Gap   - likely due to Hypoalbuminemia , low albumin with normal Ca+ and K+, treated with IV albumin   Hypertension:  --Chronic currently normotensive  DM2:  --Hold home glipizide, Hypoglycemic protocol  --POCT glucose AC and HS with SSI with meals       Full Code   DVT prophylaxis: pharmacologic and mechanical    Disposition / Plans   Disposition: home       Subjective / ROS:   Patient states she is ok, no CP , no SOB , still some cough      Medical Decision Making   Chart, Images and 
MMT arrived to collect pt on stretcher and take her to [facility] for Pulmonary consult takeover to stepdown there due to NIV ST needs, settings were adjusted on their tranport vent set at 24/12 R14 and 40% same as current settings at [facility] . Alarms have also been set and they will be using her current mask, RT asked MMT to convey the old wound healed from approx last year when she used Bipap long term then, NO resp distress noted. Tolerated the transport vent well via bipap mask.   
Patient arrived from ED. Patient aox4. Complete assessment performed. Wheezes auscultated. Pt states she isnt as short of breath as before. Safety interventions in place. Patient and family updated on plan of care. RT at bedside to place patient back on bipap.   
Patient complaining of being itchy. Complete bed bath performed. Redness to right lateral side noted. Area warm to touch. Skin actively peeling off. NP notified.     Patient stated that she continues to feel itchy. Patent stated that she is itchy anytime she receives antibiotics. Patient stated that two times previously when she was admitted to the hospital her skin peeled off after antibiotic administration. NP notified. When reviewing previous admissions it was noted that patient had received azithromycin on both admissions that patient stated skin peeled off.     
Pt only tolerated high flow for 50 min . Placed back due to low sats and agitation by Dr Winston. No resp distress noted now.    08/05/24 0824   NIV Type   NIV Started/Stopped On   Equipment Type V60   Mode Bilevel   Mask Type Full face mask   Mask Size Medium   Assessment   Pulse (!) 128   Respirations 30   BP (!) 149/108   SpO2 96 %   Level of Consciousness 0   Comfort Level Fair   Using Accessory Muscles Yes  (AGGITATED PT)   Mask Compliance Good   Skin Assessment Clean, dry, & intact   Skin Protection for O2 Device Yes   Breath Sounds   Respiratory Pattern Tachypneic   Settings/Measurements   IPAP 24 cmH20   CPAP/EPAP 12 cmH2O   Vt (Measured) 490 mL   Rate Ordered 14   FiO2  40 %   I Time/ I Time % 0.9 s   Minute Volume (L/min) 12.2 Liters   Mask Leak (lpm) 40 lpm   Humidity   (BLOW BY)   Patient's Home Machine No   Alarm Settings   Alarms On Y   Low Pressure (cmH2O) 5 cmH2O   High Pressure (cmH2O) 35 cmH2O   Apnea (secs) 20 secs   RR Low (bpm) 7   RR High (bpm) 40 br/min   Oxygen Therapy/Pulse Ox   O2 Device PAP (positive airway pressure)  (24/12 40% r14)       
Pt placed on NIV on home settings 17/12. Pt unable to get adequate VT on these settings. IPAP increased 20/12 F16 30%. Pt tolerating well. . NAD. Will continue to monitor.  
RN reports abnormal CO2 levels on a.m. labs.  Pt with severe metabolic alkalosis. Hold Lasix. Check blood gas.    
Zosyn (Piperacillin/Tazobactam) Extended Infusion    Jasmin Pacheco, a 78 y.o. yo female, has been converted to an extended infusion of Zosyn.      Ht Readings from Last 1 Encounters:   07/31/24 1.702 m (5' 7\")     Wt Readings from Last 1 Encounters:   07/31/24 (!) 145.7 kg (321 lb 4.8 oz)     CrCl : Serum creatinine: 1.52 mg/dL (H) 07/31/24 0551  Estimated creatinine clearance: 47 mL/min (A)    A loading dose of 3.375 or 4.5 gm will be given over 30 minutes depending on indication.    Extended infusions will begin 4 hours after the initial dose if CrCl  >/= 20 ml/min or 8 hours after the initial dose if CrCl < 20 ml/min.    Extended infusions will run over 4 hours (240 minutes).        Pharmacy will monitor this patient daily for changes in clinical status and renal function.     Contact the pharmacy with any questions.     Susan Nixon RPH  7/31/2024 8:42 AM    
([id number])  500 mg IntraVENous Q24H          Laboratory and Radiology Data :    Last 3 CK, CKMB, Troponin: No results for input(s): \"CKTOTAL\", \"CKMB\", \"TROPONINI\" in the last 72 hours.     Recent Results (from the past 24 hour(s))   POCT Glucose    Collection Time: 08/01/24 11:19 AM   Result Value Ref Range    POC Glucose 204 (H) 70 - 110 mg/dL    Performed by: WEST ADAN    POCT Glucose    Collection Time: 08/01/24  5:00 PM   Result Value Ref Range    POC Glucose 182 (H) 70 - 110 mg/dL    Performed by: WEST ADAN    POCT Glucose    Collection Time: 08/01/24  9:07 PM   Result Value Ref Range    POC Glucose 182 (H) 70 - 110 mg/dL    Performed by: Monty Post    POCT Glucose    Collection Time: 08/02/24  7:44 AM   Result Value Ref Range    POC Glucose 103 70 - 110 mg/dL    Performed by: Francois Rush    Basic Metabolic Panel    Collection Time: 08/02/24  8:25 AM   Result Value Ref Range    Sodium 140 136 - 145 mmol/L    Potassium 3.9 3.5 - 5.5 mmol/L    Chloride 100 100 - 111 mmol/L    CO2 37 (H) 21 - 32 mmol/L    Anion Gap 3 3.0 - 18.0 mmol/L    Glucose 135 (H) 74 - 99 mg/dL    BUN 33 (H) 7 - 18 mg/dL    Creatinine 2.10 (H) 0.60 - 1.30 mg/dL    BUN/Creatinine Ratio 16 12 - 20      Est, Glom Filt Rate 24 (L) >60 ml/min/1.73m2    Calcium 8.7 8.5 - 10.1 mg/dL   CBC with Auto Differential    Collection Time: 08/02/24  8:25 AM   Result Value Ref Range    WBC 23.4 (H) 4.6 - 13.2 K/uL    RBC 4.19 (L) 4.20 - 5.30 M/uL    Hemoglobin 11.8 (L) 12.0 - 16.0 g/dL    Hematocrit 39.2 35.0 - 45.0 %    MCV 93.6 78.0 - 100.0 FL    MCH 28.2 24.0 - 34.0 PG    MCHC 30.1 (L) 31.0 - 37.0 g/dL    RDW 15.5 (H) 11.6 - 14.5 %    Platelets 192 135 - 420 K/uL    MPV 9.8 9.2 - 11.8 FL    Nucleated RBCs 0.0 0.0  WBC    nRBC 0.00 0.00 - 0.01 K/uL    Neutrophils % PENDING %    Lymphocytes % PENDING %    Monocytes % PENDING %    Eosinophils % PENDING %    Basophils % PENDING %    Immature Granulocytes % PENDING %    Neutrophils Absolute 
PRN    glucose chewable tablet 16 g  4 tablet Oral PRN    dextrose bolus 10% 125 mL  125 mL IntraVENous PRN    Or    dextrose bolus 10% 250 mL  250 mL IntraVENous PRN    glucagon injection 1 mg  1 mg SubCUTAneous PRN    dextrose 10 % infusion   IntraVENous Continuous PRN    insulin glargine (LANTUS) injection vial 22 Units  0.15 Units/kg SubCUTAneous Nightly    insulin lispro (HUMALOG,ADMELOG) injection vial 7 Units  0.05 Units/kg SubCUTAneous TID WC    insulin lispro (HUMALOG,ADMELOG) injection vial 0-8 Units  0-8 Units SubCUTAneous TID WC    insulin lispro (HUMALOG,ADMELOG) injection vial 0-4 Units  0-4 Units SubCUTAneous Nightly    dexAMETHasone (PF) (DECADRON) injection 6 mg  6 mg IntraVENous Daily    guaiFENesin (ROBITUSSIN) 100 MG/5ML liquid 200 mg  200 mg Oral Q4H PRN          Laboratory and Radiology Data :    Last 3 CK, CKMB, Troponin:   Recent Labs     08/03/24  0600   CKTOTAL 36        Recent Results (from the past 24 hour(s))   POCT Glucose    Collection Time: 08/04/24 12:08 PM   Result Value Ref Range    POC Glucose 174 (H) 70 - 110 mg/dL    Performed by: Mikhail Fowler    POCT Glucose    Collection Time: 08/04/24  4:45 PM   Result Value Ref Range    POC Glucose 130 (H) 70 - 110 mg/dL    Performed by: Mikhail Fowler    POCT Glucose    Collection Time: 08/04/24  9:00 PM   Result Value Ref Range    POC Glucose 75 70 - 110 mg/dL    Performed by: Hussein Peña    POCT Glucose    Collection Time: 08/05/24  3:56 AM   Result Value Ref Range    POC Glucose 56 (L) 70 - 110 mg/dL    Performed by: Hussein Peña    CBC with Auto Differential    Collection Time: 08/05/24  4:19 AM   Result Value Ref Range    WBC 13.1 4.6 - 13.2 K/uL    RBC 4.18 (L) 4.20 - 5.30 M/uL    Hemoglobin 11.9 (L) 12.0 - 16.0 g/dL    Hematocrit 42.2 35.0 - 45.0 %    .0 (H) 78.0 - 100.0 FL    MCH 28.5 24.0 - 34.0 PG    MCHC 28.2 (L) 31.0 - 37.0 g/dL    RDW 15.3 (H) 11.6 - 14.5 %    Platelets 211 135 - 420 K/uL    MPV 10.0 9.2 - 11.8 FL 
by: Fredy Webb         No results found for: \"COLOR\", \"CASTS\"      Today's labs reviewed .    Imaging     Cxray images from 8/4 images reviewed personallyy   Cardiomegaly + , mild B/L Pulmonary vascular congestion     Assessment & Plan:     LEA on CKD3 -patient's baseline creatinine appears to be around 1.6-1.7, she is back at her baseline now.  -Has no significant proteinuria, UPC 0.5.CPK WNL . UA pending.  -CKD from underlying diabetes mellitus, hypertension and obesity.  -Would avoid exposure to nephrotoxic medications like IV NSAIDs and radio contrast.  -Renal imaging to be done during outpatient follow-ups.    HTN -, blood pressure fluctuating between 1   Currently not on any oral antihypertensives, will put her on IV as needed hydralazine for now.    H/o COPD , KVNG, Acute on Chr respiratory acidosis with Metabolic compensation  - nebulization treatments as deemed necessary by primary team.  She is currently on BiPAP  -ABG this morning shows chronic respiratory acidosis with a high pCO2 of 108.  -Serum CO2 elevated sec to compensation but the primary disorder she is having is acute on chronic respiratory acidosis secondary to COPD exacerbation/KVNG.  -Serum CO2 would improve with improvement in pCO2 on ABG.  -In the meantime if patient needs any diuretics for pulmonary decongest ion , may consider acetazolamide but only after respiratory acidosis is corrected as it can lead to metabolic acidosis.  -Can use Lasix PRN .    SOB , Pulmonary edema - BiPAP as above, treatment of COPD and diuretics as needed.      CKD-MBD -vitamin D is extremely low at 4.6.  Will start her on ergocalciferol 50,000 international units once a week for 12 weeks.  PTH pending.  Phos & Corrected calcium within normal limits    DM-2 -currently on insulin.  Being managed by primary team.    Signed By: Naty Obrien MD     August 5, 2024      This note was prepared using voice recognition system and is likely to have sound alike

## 2024-08-05 NOTE — PLAN OF CARE
Problem: Chronic Conditions and Co-morbidities  Goal: Patient's chronic conditions and co-morbidity symptoms are monitored and maintained or improved  8/5/2024 0730 by Isabella Montalvo RN  Outcome: Progressing  8/5/2024 0431 by Casey Savage RN  Outcome: Progressing  Flowsheets (Taken 8/4/2024 1917)  Care Plan - Patient's Chronic Conditions and Co-Morbidity Symptoms are Monitored and Maintained or Improved: Monitor and assess patient's chronic conditions and comorbid symptoms for stability, deterioration, or improvement     Problem: Discharge Planning  Goal: Discharge to home or other facility with appropriate resources  8/5/2024 0730 by Isabella Montalvo RN  Outcome: Progressing  8/5/2024 0431 by Casey Savage RN  Outcome: Progressing     Problem: Skin/Tissue Integrity  Goal: Absence of new skin breakdown  Description: 1.  Monitor for areas of redness and/or skin breakdown  2.  Assess vascular access sites hourly  3.  Every 4-6 hours minimum:  Change oxygen saturation probe site  4.  Every 4-6 hours:  If on nasal continuous positive airway pressure, respiratory therapy assess nares and determine need for appliance change or resting period.  8/5/2024 0730 by Isabella Montalvo RN  Outcome: Progressing  8/5/2024 0431 by Casey Savage RN  Outcome: Progressing     Problem: Safety - Adult  Goal: Free from fall injury  8/5/2024 0730 by Isabella Montalvo RN  Outcome: Progressing  8/5/2024 0431 by Casey Savage RN  Outcome: Progressing     Problem: ABCDS Injury Assessment  Goal: Absence of physical injury  8/5/2024 0730 by Isabella Montalvo RN  Outcome: Progressing  8/5/2024 0431 by Casey Savage RN  Outcome: Progressing     Problem: Respiratory - Adult  Goal: Achieves optimal ventilation and oxygenation  8/5/2024 0730 by Isabella Montalvo RN  Outcome: Progressing  8/5/2024 0431 by Casey Savage RN  Outcome: Progressing     Problem: Cardiovascular - Adult  Goal:

## 2024-08-05 NOTE — PLAN OF CARE
Problem: ABCDS Injury Assessment  Goal: Absence of physical injury  Outcome: Progressing     Problem: Respiratory - Adult  Goal: Achieves optimal ventilation and oxygenation  Outcome: Progressing   Patient remains on the bipap throughout the night, was unable to tolerate nasal cannula yesterday morning. Patient states no shortness of breath at this time. Will cont to monitor.       Problem: Skin/Tissue Integrity - Adult  Goal: Skin integrity remains intact  Outcome: Progressing

## 2024-08-05 NOTE — DISCHARGE SUMMARY
precautions  --supplemental O2, keep O2 saturation >89%, albuterol via MDI  --Decadron 6 mg IV daily, Azithromycin IV daily, Tylenol PRN for fever  --blood cultures ngtd, d/yasmin  zosyn  --Steroid induced leukocytosis  --Continue with Eliquis   --Lungs Diminished , No wheezes., No rales., Normal respiratory effort,   --Off BiPAP, oxygenation improved on NC , then became hypoxic again and placed back on BiPAP with increased IPAP, and added Lasix back but bicarb worsening so placed on hold again   --Hypoxia induced anxiety - PRN low dose ativan   --Repeated COVID test on 8/4/ - Negative   --due to difficult weaning and persistant hypoxia - plan to transfer to faciltiy with Pulmonary   --D/w Inge Schultz at [facility] and he accept the transfer to step down unit     Mild Acute on Chronic diastolic heart failure: --  --exacerbated by pt running of of lasix, COVID and pneumonia, BLE edema and pulm edema on CXR  --received Lasix 60mg IV in the ED, hold Lasix for now due to renal failure and Metabolic alkalosis.   --Monitor I&O and daily weights,Telemetry monitoring  --EKG ST with PVCs 105, serial troponin normal   Acute Kidney Injury on CKD IIIb    - Baseline ~Cr 1.5. Admission Cr 1.6 --> 2.0 then back to 1.6  --Strict I/O. Watch for fluid overload, Avoid nephrotoxic agents/contrast.   --Avoid drastic lowering of blood pressure in order to maintain a good renal perfusion   --Nephrology following   Low Anion Gap   - likely due to Hypoalbuminemia , low albumin with normal Ca+ and K+, treated with IV albumin   Hypertension:  --Chronic currently normotensive  DM2:  --Hold home glipizide, Hypoglycemic protocol  --POCT glucose AC and HS with SSI with meals       Full Code   DVT prophylaxis: pharmacologic and mechanical    Disposition / Plans   Disposition: Transfer to higher level of care - to another medical facility       Subjective / ROS:   Patient states still having difficulty breathing       Medical Decision Making

## 2024-08-05 NOTE — PROGRESS NOTES
Pharmacy Note     Zosyn 3375 mg Every 8 hours ordered for treatment of Sepsis of Unknown Etiology for 7 days. Per [facility] Policy, Zosyn will be changed to Zosyn 4500 mg Every 8 hours for 7 days.     Estimated Creatinine Clearance: Estimated Creatinine Clearance: 53 mL/min (A) (based on SCr of 1.36 mg/dL (H)).  Dialysis Status, LEA, CKD: n/a    BMI:  Body mass index is 49.65 kg/m².    Rationale for Adjustment:  [facility] B-Lactam extended infusion policy    Pharmacy will continue to monitor and adjust dose as necessary.      Please call with any questions.    Thank you,  Graham Alvarado RPH

## 2024-08-05 NOTE — H&P
[facility]  Pulmonary, Critical Care, and Sleep Medicine    Name: Jasmin Pacheco MRN: [id number]   : 1945 Hospital: [facility]   Date: 2024        Critical Care History and Physical    IMPRESSION:   Acute hypoxic and hypercapnic respiratory failure requiring intubation  COVID pneumonia  Metabolic encephalopathy  Acute on chronic diastolic heart failure (ECHO 24)  LEA on CKD 3B  Morbid obesity  T2DM   Hx PE - on eliquis outpatient  VQ scan \"high probability\" of PE 24     Patient Active Problem List   Diagnosis    Acute respiratory failure (HCC)    Acute on chronic diastolic heart failure (HCC)    Hyperlipidemia    Diabetes mellitus type 2 in obese    Acute cystitis    Shortness of breath    COPD exacerbation (HCC)    Acute on chronic respiratory failure with hypoxia and hypercapnia (HCC)    Acute respiratory failure with hypoxia and hypercarbia (HCC)      RECOMMENDATIONS:   Neuro: Titrate sedation to RASS of 0 to -1. PRN for breakthrough sedation needs.   Pulm: Titrate FiO2 for goal SPO2> 90%,VAP prevention bundle, head of the bed at 30' all times.Daily sedation holiday and assessment for weaning with SBT as tolerated.    PRN duonebs.   Supplemental O2 via NC, titrate flow for goal SPO2> 90% Bronchodilators, steroids, pulmonary hygiene care, Aspiration precautions, Keep HOB >30 degrees  CVS :PRN vasopressors. Actively titrate vasopressors aim MAP >65mmHg, Check cardiac panel  Fluids: NA  GI: SUP, Trend LFTs, Zofran PRN for N/V, Diet NPO  Renal:  Trend Renal indices, Strict Is/Os, Mead (+)  Hem/Onc: Monitor for s/o active bleeding.     I/D:Sepsis bundle per hospital protocol, Blood, Sputum, and Urine cultures drawn and will be followed. Lactic acid ordered- initial and repeat Q4hrs till normalized. Trend WBCs and temperature curve.  Antibiotics: Vanc, zosyn   COVID (+) 24    Endocrine: Q6 glucoses, SSI. Check TSH level  Metabolic:  Daily BMP, mag, phos.

## 2024-08-06 ENCOUNTER — APPOINTMENT (OUTPATIENT)
Facility: HOSPITAL | Age: 79
DRG: 870 | End: 2024-08-06
Attending: INTERNAL MEDICINE
Payer: MEDICARE

## 2024-08-06 PROBLEM — I50.9 CONGESTIVE HEART FAILURE (HCC): Status: ACTIVE | Noted: 2024-08-06

## 2024-08-06 LAB
ANION GAP SERPL CALC-SCNC: 4 MMOL/L (ref 3–18)
ANION GAP SERPL CALC-SCNC: 5 MMOL/L (ref 3–18)
APTT PPP: 81.4 SEC (ref 23–36.4)
APTT PPP: 94.6 SEC (ref 23–36.4)
ARTERIAL PATENCY WRIST A: ABNORMAL
BACTERIA SPEC CULT: NORMAL
BACTERIA SPEC CULT: NORMAL
BASE EXCESS BLD CALC-SCNC: 17.8 MMOL/L
BASOPHILS # BLD: 0 K/UL (ref 0–0.1)
BASOPHILS NFR BLD: 0 % (ref 0–2)
BDY SITE: ABNORMAL
BUN SERPL-MCNC: 43 MG/DL (ref 7–18)
BUN SERPL-MCNC: 49 MG/DL (ref 7–18)
BUN/CREAT SERPL: 21 (ref 12–20)
BUN/CREAT SERPL: 26 (ref 12–20)
CA-I SERPL-SCNC: 1.03 MMOL/L (ref 1.15–1.33)
CALCIUM SERPL-MCNC: 8.8 MG/DL (ref 8.5–10.1)
CALCIUM SERPL-MCNC: 8.9 MG/DL (ref 8.5–10.1)
CHLORIDE SERPL-SCNC: 103 MMOL/L (ref 100–111)
CHLORIDE SERPL-SCNC: 103 MMOL/L (ref 100–111)
CO2 SERPL-SCNC: 39 MMOL/L (ref 21–32)
CO2 SERPL-SCNC: 41 MMOL/L (ref 21–32)
CREAT SERPL-MCNC: 1.68 MG/DL (ref 0.6–1.3)
CREAT SERPL-MCNC: 2.33 MG/DL (ref 0.6–1.3)
DIFFERENTIAL METHOD BLD: ABNORMAL
ECHO AO ROOT DIAM: 3.2 CM
ECHO AO ROOT INDEX: 1.3 CM/M2
ECHO AV AREA PEAK VELOCITY: 3.4 CM2
ECHO AV AREA/BSA PEAK VELOCITY: 1.4 CM2/M2
ECHO AV PEAK GRADIENT: 10 MMHG
ECHO AV PEAK VELOCITY: 1.6 M/S
ECHO AV VELOCITY RATIO: 0.75
ECHO BSA: 2.61 M2
ECHO LA VOL A-L A2C: 45 ML (ref 22–52)
ECHO LA VOL A-L A4C: 47 ML (ref 22–52)
ECHO LA VOL MOD A2C: 44 ML (ref 22–52)
ECHO LA VOL MOD A4C: 46 ML (ref 22–52)
ECHO LA VOLUME AREA LENGTH: 46 ML
ECHO LA VOLUME INDEX A-L A2C: 18 ML/M2 (ref 16–34)
ECHO LA VOLUME INDEX A-L A4C: 19 ML/M2 (ref 16–34)
ECHO LA VOLUME INDEX AREA LENGTH: 19 ML/M2 (ref 16–34)
ECHO LA VOLUME INDEX MOD A2C: 18 ML/M2 (ref 16–34)
ECHO LA VOLUME INDEX MOD A4C: 19 ML/M2 (ref 16–34)
ECHO LV E' LATERAL VELOCITY: 3 CM/S
ECHO LV E' SEPTAL VELOCITY: 6 CM/S
ECHO LV EDV A2C: 108 ML
ECHO LV EDV A4C: 95 ML
ECHO LV EDV BP: 106 ML (ref 56–104)
ECHO LV EDV INDEX A4C: 39 ML/M2
ECHO LV EDV INDEX BP: 43 ML/M2
ECHO LV EDV NDEX A2C: 44 ML/M2
ECHO LV EJECTION FRACTION A2C: 59 %
ECHO LV EJECTION FRACTION A4C: 56 %
ECHO LV EJECTION FRACTION BIPLANE: 58 % (ref 55–100)
ECHO LV ESV A2C: 44 ML
ECHO LV ESV A4C: 41 ML
ECHO LV ESV BP: 44 ML (ref 19–49)
ECHO LV ESV INDEX A2C: 18 ML/M2
ECHO LV ESV INDEX A4C: 17 ML/M2
ECHO LV ESV INDEX BP: 18 ML/M2
ECHO LVOT AREA: 4.5 CM2
ECHO LVOT DIAM: 2.4 CM
ECHO LVOT MEAN GRADIENT: 3 MMHG
ECHO LVOT PEAK GRADIENT: 6 MMHG
ECHO LVOT PEAK VELOCITY: 1.2 M/S
ECHO LVOT STROKE VOLUME INDEX: 39.9 ML/M2
ECHO LVOT SV: 98.1 ML
ECHO LVOT VTI: 21.7 CM
ECHO MV A VELOCITY: 1.05 M/S
ECHO MV E DECELERATION TIME (DT): 239.2 MS
ECHO MV E VELOCITY: 0.73 M/S
ECHO MV E/A RATIO: 0.7
ECHO MV E/E' LATERAL: 24.33
ECHO MV E/E' RATIO (AVERAGED): 18.25
ECHO MV E/E' SEPTAL: 12.17
ECHO RV FREE WALL PEAK S': 18 CM/S
ECHO RV TAPSE: 2.3 CM (ref 1.7–?)
ECHO TV REGURGITANT MAX VELOCITY: 3.37 M/S
ECHO TV REGURGITANT PEAK GRADIENT: 46 MMHG
EOSINOPHIL # BLD: 0.1 K/UL (ref 0–0.4)
EOSINOPHIL NFR BLD: 1 % (ref 0–5)
ERYTHROCYTE [DISTWIDTH] IN BLOOD BY AUTOMATED COUNT: 15.2 % (ref 11.6–14.5)
EST. AVERAGE GLUCOSE BLD GHB EST-MCNC: 154 MG/DL
GAS FLOW.O2 O2 DELIVERY SYS: ABNORMAL
GAS FLOW.O2 SETTING OXYMISER: 16 BPM
GLUCOSE BLD STRIP.AUTO-MCNC: 171 MG/DL (ref 70–110)
GLUCOSE BLD STRIP.AUTO-MCNC: 189 MG/DL (ref 70–110)
GLUCOSE BLD STRIP.AUTO-MCNC: 231 MG/DL (ref 70–110)
GLUCOSE BLD STRIP.AUTO-MCNC: 253 MG/DL (ref 70–110)
GLUCOSE BLD STRIP.AUTO-MCNC: 254 MG/DL (ref 70–110)
GLUCOSE SERPL-MCNC: 164 MG/DL (ref 74–99)
GLUCOSE SERPL-MCNC: 283 MG/DL (ref 74–99)
HBA1C MFR BLD: 7 % (ref 4.2–5.6)
HCO3 BLD-SCNC: 44.2 MMOL/L (ref 22–26)
HCT VFR BLD AUTO: 39.6 % (ref 35–45)
HGB BLD-MCNC: 11.4 G/DL (ref 12–16)
IMM GRANULOCYTES # BLD AUTO: 0.2 K/UL (ref 0–0.04)
IMM GRANULOCYTES NFR BLD AUTO: 2 % (ref 0–0.5)
LACTATE SERPL-SCNC: 3.7 MMOL/L (ref 0.4–2)
LACTATE SERPL-SCNC: 3.9 MMOL/L (ref 0.4–2)
LYMPHOCYTES # BLD: 1.3 K/UL (ref 0.9–3.6)
LYMPHOCYTES NFR BLD: 16 % (ref 21–52)
Lab: NORMAL
Lab: NORMAL
MAGNESIUM SERPL-MCNC: 2.2 MG/DL (ref 1.6–2.6)
MAGNESIUM SERPL-MCNC: 2.4 MG/DL (ref 1.6–2.6)
MAGNESIUM SERPL-MCNC: 2.5 MG/DL (ref 1.6–2.6)
MCH RBC QN AUTO: 27.8 PG (ref 24–34)
MCHC RBC AUTO-ENTMCNC: 28.8 G/DL (ref 31–37)
MCV RBC AUTO: 96.6 FL (ref 78–100)
MONOCYTES # BLD: 0.2 K/UL (ref 0.05–1.2)
MONOCYTES NFR BLD: 2 % (ref 3–10)
NEUTS SEG # BLD: 6.6 K/UL (ref 1.8–8)
NEUTS SEG NFR BLD: 80 % (ref 40–73)
NRBC # BLD: 0.11 K/UL (ref 0–0.01)
NRBC BLD-RTO: 1.3 PER 100 WBC
O2/TOTAL GAS SETTING VFR VENT: 70 %
PCO2 BLD: 54.6 MMHG (ref 35–45)
PEEP RESPIRATORY: 6 CMH2O
PH BLD: 7.52 (ref 7.35–7.45)
PHOSPHATE SERPL-MCNC: 0.4 MG/DL (ref 2.5–4.9)
PHOSPHATE SERPL-MCNC: 2.3 MG/DL (ref 2.5–4.9)
PHOSPHATE SERPL-MCNC: 2.7 MG/DL (ref 2.5–4.9)
PLATELET # BLD AUTO: 209 K/UL (ref 135–420)
PMV BLD AUTO: 10.1 FL (ref 9.2–11.8)
PO2 BLD: 57 MMHG (ref 80–100)
POTASSIUM SERPL-SCNC: 4 MMOL/L (ref 3.5–5.5)
POTASSIUM SERPL-SCNC: 4 MMOL/L (ref 3.5–5.5)
RBC # BLD AUTO: 4.1 M/UL (ref 4.2–5.3)
SAO2 % BLD: 91 % (ref 92–97)
SERVICE CMNT-IMP: ABNORMAL
SODIUM SERPL-SCNC: 146 MMOL/L (ref 136–145)
SODIUM SERPL-SCNC: 149 MMOL/L (ref 136–145)
SPECIMEN TYPE: ABNORMAL
VANCOMYCIN SERPL-MCNC: 15.6 UG/ML (ref 5–40)
VENTILATION MODE VENT: ABNORMAL
VT SETTING VENT: 400 ML
WBC # BLD AUTO: 8.3 K/UL (ref 4.6–13.2)

## 2024-08-06 PROCEDURE — 6370000000 HC RX 637 (ALT 250 FOR IP): Performed by: NURSE PRACTITIONER

## 2024-08-06 PROCEDURE — 2500000003 HC RX 250 WO HCPCS: Performed by: INTERNAL MEDICINE

## 2024-08-06 PROCEDURE — 80048 BASIC METABOLIC PNL TOTAL CA: CPT

## 2024-08-06 PROCEDURE — 85730 THROMBOPLASTIN TIME PARTIAL: CPT

## 2024-08-06 PROCEDURE — 6360000002 HC RX W HCPCS: Performed by: NURSE PRACTITIONER

## 2024-08-06 PROCEDURE — 2500000003 HC RX 250 WO HCPCS: Performed by: NURSE PRACTITIONER

## 2024-08-06 PROCEDURE — 6360000002 HC RX W HCPCS: Performed by: PHYSICIAN ASSISTANT

## 2024-08-06 PROCEDURE — 82962 GLUCOSE BLOOD TEST: CPT

## 2024-08-06 PROCEDURE — 2580000003 HC RX 258: Performed by: INTERNAL MEDICINE

## 2024-08-06 PROCEDURE — 83735 ASSAY OF MAGNESIUM: CPT

## 2024-08-06 PROCEDURE — 6360000004 HC RX CONTRAST MEDICATION: Performed by: INTERNAL MEDICINE

## 2024-08-06 PROCEDURE — 80202 ASSAY OF VANCOMYCIN: CPT

## 2024-08-06 PROCEDURE — 94761 N-INVAS EAR/PLS OXIMETRY MLT: CPT

## 2024-08-06 PROCEDURE — 36415 COLL VENOUS BLD VENIPUNCTURE: CPT

## 2024-08-06 PROCEDURE — 2580000003 HC RX 258: Performed by: NURSE PRACTITIONER

## 2024-08-06 PROCEDURE — 82330 ASSAY OF CALCIUM: CPT

## 2024-08-06 PROCEDURE — 94003 VENT MGMT INPAT SUBQ DAY: CPT

## 2024-08-06 PROCEDURE — 82803 BLOOD GASES ANY COMBINATION: CPT

## 2024-08-06 PROCEDURE — 2580000003 HC RX 258: Performed by: PHYSICIAN ASSISTANT

## 2024-08-06 PROCEDURE — 2000000000 HC ICU R&B

## 2024-08-06 PROCEDURE — 93306 TTE W/DOPPLER COMPLETE: CPT | Performed by: INTERNAL MEDICINE

## 2024-08-06 PROCEDURE — 5A1955Z RESPIRATORY VENTILATION, GREATER THAN 96 CONSECUTIVE HOURS: ICD-10-PCS | Performed by: NURSE PRACTITIONER

## 2024-08-06 PROCEDURE — 87086 URINE CULTURE/COLONY COUNT: CPT

## 2024-08-06 PROCEDURE — 83605 ASSAY OF LACTIC ACID: CPT

## 2024-08-06 PROCEDURE — 71045 X-RAY EXAM CHEST 1 VIEW: CPT

## 2024-08-06 PROCEDURE — 6370000000 HC RX 637 (ALT 250 FOR IP): Performed by: INTERNAL MEDICINE

## 2024-08-06 PROCEDURE — 94640 AIRWAY INHALATION TREATMENT: CPT

## 2024-08-06 PROCEDURE — 6360000002 HC RX W HCPCS

## 2024-08-06 PROCEDURE — 2500000003 HC RX 250 WO HCPCS: Performed by: PHYSICIAN ASSISTANT

## 2024-08-06 PROCEDURE — P9045 ALBUMIN (HUMAN), 5%, 250 ML: HCPCS | Performed by: NURSE PRACTITIONER

## 2024-08-06 PROCEDURE — 36600 WITHDRAWAL OF ARTERIAL BLOOD: CPT

## 2024-08-06 PROCEDURE — 85025 COMPLETE CBC W/AUTO DIFF WBC: CPT

## 2024-08-06 PROCEDURE — 83036 HEMOGLOBIN GLYCOSYLATED A1C: CPT

## 2024-08-06 PROCEDURE — 3E043XZ INTRODUCTION OF VASOPRESSOR INTO CENTRAL VEIN, PERCUTANEOUS APPROACH: ICD-10-PCS | Performed by: NURSE PRACTITIONER

## 2024-08-06 PROCEDURE — C8929 TTE W OR WO FOL WCON,DOPPLER: HCPCS

## 2024-08-06 PROCEDURE — 99291 CRITICAL CARE FIRST HOUR: CPT | Performed by: INTERNAL MEDICINE

## 2024-08-06 PROCEDURE — 84100 ASSAY OF PHOSPHORUS: CPT

## 2024-08-06 PROCEDURE — 2700000000 HC OXYGEN THERAPY PER DAY

## 2024-08-06 RX ORDER — GLUCAGON 1 MG
1 KIT INJECTION PRN
Status: DISCONTINUED | OUTPATIENT
Start: 2024-08-06 | End: 2024-08-24 | Stop reason: HOSPADM

## 2024-08-06 RX ORDER — PHENYLEPHRINE HCL IN 0.9% NACL 1 MG/10 ML
SYRINGE (ML) INTRAVENOUS
Status: COMPLETED
Start: 2024-08-06 | End: 2024-08-06

## 2024-08-06 RX ORDER — DEXMEDETOMIDINE HYDROCHLORIDE 4 UG/ML
.1-1.5 INJECTION, SOLUTION INTRAVENOUS CONTINUOUS
Status: DISCONTINUED | OUTPATIENT
Start: 2024-08-06 | End: 2024-08-09

## 2024-08-06 RX ORDER — THIAMINE HYDROCHLORIDE 100 MG/ML
200 INJECTION, SOLUTION INTRAMUSCULAR; INTRAVENOUS 3 TIMES DAILY
Status: DISCONTINUED | OUTPATIENT
Start: 2024-08-06 | End: 2024-08-22

## 2024-08-06 RX ORDER — DEXTROSE MONOHYDRATE 100 MG/ML
INJECTION, SOLUTION INTRAVENOUS CONTINUOUS PRN
Status: DISCONTINUED | OUTPATIENT
Start: 2024-08-06 | End: 2024-08-24 | Stop reason: HOSPADM

## 2024-08-06 RX ORDER — CALCIUM GLUCONATE 20 MG/ML
1000 INJECTION, SOLUTION INTRAVENOUS
Status: COMPLETED | OUTPATIENT
Start: 2024-08-06 | End: 2024-08-06

## 2024-08-06 RX ORDER — NOREPINEPHRINE BITARTRATE 0.06 MG/ML
1-100 INJECTION, SOLUTION INTRAVENOUS CONTINUOUS
Status: DISCONTINUED | OUTPATIENT
Start: 2024-08-06 | End: 2024-08-07

## 2024-08-06 RX ORDER — ALBUMIN, HUMAN INJ 5% 5 %
12.5 SOLUTION INTRAVENOUS ONCE
Status: COMPLETED | OUTPATIENT
Start: 2024-08-06 | End: 2024-08-06

## 2024-08-06 RX ORDER — FOLIC ACID 1 MG/1
1 TABLET ORAL DAILY
Status: DISCONTINUED | OUTPATIENT
Start: 2024-08-06 | End: 2024-08-24 | Stop reason: HOSPADM

## 2024-08-06 RX ORDER — INSULIN LISPRO 100 [IU]/ML
0-4 INJECTION, SOLUTION INTRAVENOUS; SUBCUTANEOUS EVERY 6 HOURS
Status: DISCONTINUED | OUTPATIENT
Start: 2024-08-06 | End: 2024-08-06

## 2024-08-06 RX ORDER — INSULIN LISPRO 100 [IU]/ML
0-16 INJECTION, SOLUTION INTRAVENOUS; SUBCUTANEOUS EVERY 6 HOURS
Status: DISCONTINUED | OUTPATIENT
Start: 2024-08-07 | End: 2024-08-14

## 2024-08-06 RX ORDER — ACETAZOLAMIDE 500 MG/5ML
500 INJECTION, POWDER, LYOPHILIZED, FOR SOLUTION INTRAVENOUS ONCE
Status: COMPLETED | OUTPATIENT
Start: 2024-08-06 | End: 2024-08-06

## 2024-08-06 RX ORDER — CHLORHEXIDINE GLUCONATE ORAL RINSE 1.2 MG/ML
15 SOLUTION DENTAL 2 TIMES DAILY
Status: DISCONTINUED | OUTPATIENT
Start: 2024-08-06 | End: 2024-08-11

## 2024-08-06 RX ADMIN — CALCIUM GLUCONATE 1000 MG: 20 INJECTION, SOLUTION INTRAVENOUS at 06:05

## 2024-08-06 RX ADMIN — SODIUM CHLORIDE, PRESERVATIVE FREE 10 ML: 5 INJECTION INTRAVENOUS at 09:00

## 2024-08-06 RX ADMIN — SODIUM CHLORIDE, PRESERVATIVE FREE 10 ML: 5 INJECTION INTRAVENOUS at 22:31

## 2024-08-06 RX ADMIN — ACETAMINOPHEN 325MG 650 MG: 325 TABLET ORAL at 20:19

## 2024-08-06 RX ADMIN — THIAMINE HYDROCHLORIDE 200 MG: 100 INJECTION, SOLUTION INTRAMUSCULAR; INTRAVENOUS at 20:04

## 2024-08-06 RX ADMIN — IPRATROPIUM BROMIDE AND ALBUTEROL SULFATE 1 DOSE: .5; 3 SOLUTION RESPIRATORY (INHALATION) at 05:51

## 2024-08-06 RX ADMIN — PIPERACILLIN AND TAZOBACTAM 4500 MG: 4; .5 INJECTION, POWDER, FOR SOLUTION INTRAVENOUS; PARENTERAL at 01:28

## 2024-08-06 RX ADMIN — PERFLUTREN 2 ML: 6.52 INJECTION, SUSPENSION INTRAVENOUS at 09:49

## 2024-08-06 RX ADMIN — THIAMINE HYDROCHLORIDE 200 MG: 100 INJECTION, SOLUTION INTRAMUSCULAR; INTRAVENOUS at 15:17

## 2024-08-06 RX ADMIN — 0.12% CHLORHEXIDINE GLUCONATE 15 ML: 1.2 RINSE ORAL at 20:04

## 2024-08-06 RX ADMIN — POTASSIUM & SODIUM PHOSPHATES POWDER PACK 280-160-250 MG 250 MG: 280-160-250 PACK at 11:57

## 2024-08-06 RX ADMIN — ALBUMIN (HUMAN) 12.5 G: 12.5 INJECTION, SOLUTION INTRAVENOUS at 08:17

## 2024-08-06 RX ADMIN — DEXMEDETOMIDINE HYDROCHLORIDE 0.4 MCG/KG/HR: 4 INJECTION, SOLUTION INTRAVENOUS at 02:15

## 2024-08-06 RX ADMIN — DEXMEDETOMIDINE HYDROCHLORIDE 0.4 MCG/KG/HR: 4 INJECTION, SOLUTION INTRAVENOUS at 21:12

## 2024-08-06 RX ADMIN — Medication 3 MCG/MIN: at 11:43

## 2024-08-06 RX ADMIN — IPRATROPIUM BROMIDE AND ALBUTEROL SULFATE 1 DOSE: .5; 3 SOLUTION RESPIRATORY (INHALATION) at 10:50

## 2024-08-06 RX ADMIN — IPRATROPIUM BROMIDE AND ALBUTEROL SULFATE 1 DOSE: .5; 3 SOLUTION RESPIRATORY (INHALATION) at 14:10

## 2024-08-06 RX ADMIN — 0.12% CHLORHEXIDINE GLUCONATE 15 ML: 1.2 RINSE ORAL at 09:24

## 2024-08-06 RX ADMIN — SODIUM PHOSPHATE, MONOBASIC, MONOHYDRATE AND SODIUM PHOSPHATE, DIBASIC, ANHYDROUS 30 MMOL: 142; 276 INJECTION, SOLUTION INTRAVENOUS at 09:27

## 2024-08-06 RX ADMIN — INSULIN LISPRO 2 UNITS: 100 INJECTION, SOLUTION INTRAVENOUS; SUBCUTANEOUS at 18:16

## 2024-08-06 RX ADMIN — IPRATROPIUM BROMIDE AND ALBUTEROL SULFATE 1 DOSE: .5; 3 SOLUTION RESPIRATORY (INHALATION) at 01:14

## 2024-08-06 RX ADMIN — HEPARIN SODIUM 14 UNITS/KG/HR: 10000 INJECTION, SOLUTION INTRAVENOUS at 10:48

## 2024-08-06 RX ADMIN — HEPARIN SODIUM 14 UNITS/KG/HR: 10000 INJECTION, SOLUTION INTRAVENOUS at 23:26

## 2024-08-06 RX ADMIN — PIPERACILLIN AND TAZOBACTAM 4500 MG: 4; .5 INJECTION, POWDER, FOR SOLUTION INTRAVENOUS; PARENTERAL at 10:59

## 2024-08-06 RX ADMIN — IPRATROPIUM BROMIDE AND ALBUTEROL SULFATE 1 DOSE: .5; 3 SOLUTION RESPIRATORY (INHALATION) at 18:13

## 2024-08-06 RX ADMIN — CALCIUM GLUCONATE 1000 MG: 20 INJECTION, SOLUTION INTRAVENOUS at 07:00

## 2024-08-06 RX ADMIN — INSULIN LISPRO 8 UNITS: 100 INJECTION, SOLUTION INTRAVENOUS; SUBCUTANEOUS at 23:42

## 2024-08-06 RX ADMIN — DEXMEDETOMIDINE HYDROCHLORIDE 0.4 MCG/KG/HR: 4 INJECTION, SOLUTION INTRAVENOUS at 09:25

## 2024-08-06 RX ADMIN — IPRATROPIUM BROMIDE AND ALBUTEROL SULFATE 1 DOSE: .5; 3 SOLUTION RESPIRATORY (INHALATION) at 19:58

## 2024-08-06 RX ADMIN — FAMOTIDINE 20 MG: 10 INJECTION, SOLUTION INTRAVENOUS at 09:24

## 2024-08-06 RX ADMIN — ACETAZOLAMIDE SODIUM 500 MG: 500 INJECTION, POWDER, LYOPHILIZED, FOR SOLUTION INTRAVENOUS at 13:42

## 2024-08-06 RX ADMIN — Medication 0.2 MG: at 12:05

## 2024-08-06 RX ADMIN — Medication 75 MCG/HR: at 07:57

## 2024-08-06 RX ADMIN — INSULIN LISPRO 1 UNITS: 100 INJECTION, SOLUTION INTRAVENOUS; SUBCUTANEOUS at 12:46

## 2024-08-06 RX ADMIN — DEXMEDETOMIDINE HYDROCHLORIDE 0.4 MCG/KG/HR: 4 INJECTION, SOLUTION INTRAVENOUS at 15:13

## 2024-08-06 RX ADMIN — FOLIC ACID 1 MG: 1 TABLET ORAL at 11:57

## 2024-08-06 RX ADMIN — Medication 75 MCG/HR: at 18:40

## 2024-08-06 RX ADMIN — PIPERACILLIN AND TAZOBACTAM 4500 MG: 4; .5 INJECTION, POWDER, FOR SOLUTION INTRAVENOUS; PARENTERAL at 18:18

## 2024-08-06 RX ADMIN — THIAMINE HYDROCHLORIDE 200 MG: 100 INJECTION, SOLUTION INTRAMUSCULAR; INTRAVENOUS at 12:01

## 2024-08-06 ASSESSMENT — PULMONARY FUNCTION TESTS
PIF_VALUE: 27
PIF_VALUE: 26
PIF_VALUE: 27
PIF_VALUE: 30
PIF_VALUE: 27
PIF_VALUE: 27

## 2024-08-06 ASSESSMENT — PAIN SCALES - GENERAL
PAINLEVEL_OUTOF10: 0
PAINLEVEL_OUTOF10: 0

## 2024-08-06 NOTE — PROGRESS NOTES
[facility].  Pulmonary, Critical Care, and Sleep Medicine    Name: Jasmin Pacheco MRN: [id number]   : 1945 Hospital: [facility]   Date: 2024  Admission Date: 2024     Chart and notes reviewed. Data reviewed. I have evaluated all findings.    [x]I have reviewed the flowsheet and previous day’s notes.      IMPRESSION:   Acute/chronic hypoxic and hypercapnic respiratory failure requiring intubation in the setting of COVID pna with consideration of underlying bacterial pna +/- ADHF , with underlying likely PE and pulmonary hypertension   COVID pneumonia  Metabolic encephalopathy  ADHF, HFpEF with G2DD  Pulmonary HTN, RVSP 67  Hypernatremia, hypervolemic   LEA on CKD 3B  Morbid obesity  T2DM   KVNG  Hx PE - on eliquis outpatient  VQ scan \"high probability\" of PE 24     Patient Active Problem List   Diagnosis    Acute respiratory failure (HCC)    Acute on chronic diastolic heart failure (HCC)    Hyperlipidemia    Diabetes mellitus type 2 in obese    Acute cystitis    Shortness of breath    COPD exacerbation (HCC)    Acute on chronic respiratory failure with hypoxia and hypercapnia (HCC)    Acute respiratory failure with hypoxia and hypercarbia (HCC)    Congestive heart failure (HCC)        RECOMMENDATIONS:   Neuro:   Titrate sedation to RASS of 0 to -1. PRN for breakthrough sedation needs  Precedex and fentanyl for sedation   Pulm:   Titrate FiO2 for goal SPO2> 90%,VAP prevention bundle, head of the bed at 30' all times.Daily sedation holiday and assessment for weaning with SBT as tolerated.    PRN duonebs.   Will increase PEEP to 10  Aspiration precautions, Keep HOB >30 degrees  Duoneb q4h   CVS :  Actively titrate vasopressors aim MAP >65mmHg  Heparin gtt for hx of VTE and possible PE  GI:   SUP  Diet NPO  Renal:    Trend Renal indices, Strict Is/Os, Mead (+)  Diamox for metabolic alkalosis   Increase free water to 100ml q1h  Aggressive replacement of electrolytes.  signed by Saeed Lopez      08/03/23    VAS DUP LOWER EXTREMITY VENOUS BILATERAL 08/04/2023  7:57 PM (Final)    Interpretation Summary    Technically difficult exam due to body habitus. No evidence of deep vein thrombosis in segments visualized. Poor visualization of bilateral distal femoral veins and sub-optimal visualization of some of the right mid fermoral vein, flow characteristics suggest gross patency of bilateral femoral veins. Limited visualization of bilateral calf veins.    Pulsatile venous flow bilaterally, which can sometimes suggest elevated right heart pressure.    Signed by: Feroz Marrero MD on 8/4/2023  7:57 PM     No valid procedures specified.         ROXY Barth - NP  08/06/24  Pulmonary, Critical Care Medicine  [facility]

## 2024-08-06 NOTE — PLAN OF CARE
Problem: Safety - Adult  Goal: Free from fall injury  Outcome: Progressing     Problem: Chronic Conditions and Co-morbidities  Goal: Patient's chronic conditions and co-morbidity symptoms are monitored and maintained or improved  Outcome: Progressing  Flowsheets (Taken 8/5/2024 1700 by Liam Nash, RN)  Care Plan - Patient's Chronic Conditions and Co-Morbidity Symptoms are Monitored and Maintained or Improved:   Monitor and assess patient's chronic conditions and comorbid symptoms for stability, deterioration, or improvement   Update acute care plan with appropriate goals if chronic or comorbid symptoms are exacerbated and prevent overall improvement and discharge   Collaborate with multidisciplinary team to address chronic and comorbid conditions and prevent exacerbation or deterioration     Problem: Discharge Planning  Goal: Discharge to home or other facility with appropriate resources  Outcome: Progressing  Flowsheets (Taken 8/5/2024 1700 by Liam Nash, RN)  Discharge to home or other facility with appropriate resources:   Identify barriers to discharge with patient and caregiver   Arrange for needed discharge resources and transportation as appropriate   Identify discharge learning needs (meds, wound care, etc)   Refer to discharge planning if patient needs post-hospital services based on physician order or complex needs related to functional status, cognitive ability or social support system     Problem: Pain  Goal: Verbalizes/displays adequate comfort level or baseline comfort level  Outcome: Progressing     Problem: Safety - Medical Restraint  Goal: Remains free of injury from restraints (Restraint for Interference with Medical Device)  Description: INTERVENTIONS:  1. Determine that other, less restrictive measures have been tried or would not be effective before applying the restraint  2. Evaluate the patient's condition at the time of restraint application  3. Inform patient/family  regarding the reason for restraint  4. Q2H: Monitor safety, psychosocial status, comfort, nutrition and hydration  Outcome: Progressing  Flowsheets  Taken 8/5/2024 2200 by Margo Dimas RN  Remains free of injury from restraints (restraint for interference with medical device):   Determine that other, less restrictive measures have been tried or would not be effective before applying the restraint   Evaluate the patient's condition at the time of restraint application   Inform patient/family regarding the reason for restraint   Every 2 hours: Monitor safety, psychosocial status, comfort, nutrition and hydration  Taken 8/5/2024 2011 by Liam Nash RN  Remains free of injury from restraints (restraint for interference with medical device):   Determine that other, less restrictive measures have been tried or would not be effective before applying the restraint   Evaluate the patient's condition at the time of restraint application   Inform patient/family regarding the reason for restraint   Every 2 hours: Monitor safety, psychosocial status, comfort, nutrition and hydration

## 2024-08-06 NOTE — PROGRESS NOTES
1900> Assumed care. Patient is intubated, sedated with propofol at this time. VSS. Will continue to monitor.     0000> Vital signs remain stable.     0700> Scanned tylenol for lowgrade fever but upon checking on KUB, it hasn't been read yet. Discarded tylenol.     Bedside and Verbal shift change report given to VINAYAK Andrew and VINAYAK Monique (oncoming nurse) by VINAYAK Aragon (offgoing nurse). Report included the following information Nurse Handoff Report, Index, Intake/Output, MAR, and Cardiac Rhythm SR .

## 2024-08-06 NOTE — PLAN OF CARE
Problem: Safety - Adult  Goal: Free from fall injury  Outcome: Progressing     Problem: Chronic Conditions and Co-morbidities  Goal: Patient's chronic conditions and co-morbidity symptoms are monitored and maintained or improved  Outcome: Progressing     Problem: Discharge Planning  Goal: Discharge to home or other facility with appropriate resources  Outcome: Progressing     Problem: Pain  Goal: Verbalizes/displays adequate comfort level or baseline comfort level  Outcome: Progressing     Problem: Safety - Medical Restraint  Goal: Remains free of injury from restraints (Restraint for Interference with Medical Device)  Description: INTERVENTIONS:  1. Determine that other, less restrictive measures have been tried or would not be effective before applying the restraint  2. Evaluate the patient's condition at the time of restraint application  3. Inform patient/family regarding the reason for restraint  4. Q2H: Monitor safety, psychosocial status, comfort, nutrition and hydration  Outcome: Progressing  Flowsheets  Taken 8/6/2024 0600 by Margo Dimas RN  Remains free of injury from restraints (restraint for interference with medical device):   Determine that other, less restrictive measures have been tried or would not be effective before applying the restraint   Evaluate the patient's condition at the time of restraint application   Inform patient/family regarding the reason for restraint   Every 2 hours: Monitor safety, psychosocial status, comfort, nutrition and hydration  Taken 8/6/2024 0400 by Margo Dimas RN  Remains free of injury from restraints (restraint for interference with medical device):   Determine that other, less restrictive measures have been tried or would not be effective before applying the restraint   Evaluate the patient's condition at the time of restraint application   Inform patient/family regarding the reason for restraint   Every 2 hours: Monitor safety, psychosocial status,  comfort, nutrition and hydration  Taken 8/6/2024 0200 by Margo Dimas RN  Remains free of injury from restraints (restraint for interference with medical device):   Determine that other, less restrictive measures have been tried or would not be effective before applying the restraint   Evaluate the patient's condition at the time of restraint application   Inform patient/family regarding the reason for restraint   Every 2 hours: Monitor safety, psychosocial status, comfort, nutrition and hydration  Taken 8/6/2024 0000 by Margo Dimas RN  Remains free of injury from restraints (restraint for interference with medical device):   Determine that other, less restrictive measures have been tried or would not be effective before applying the restraint   Evaluate the patient's condition at the time of restraint application   Inform patient/family regarding the reason for restraint   Every 2 hours: Monitor safety, psychosocial status, comfort, nutrition and hydration     Problem: ABCDS Injury Assessment  Goal: Absence of physical injury  Outcome: Progressing     Problem: Nutrition Deficit:  Goal: Optimize nutritional status  Outcome: Progressing

## 2024-08-06 NOTE — PROGRESS NOTES
[facility]   Pharmacy Pharmacokinetic Monitoring Service - Vancomycin    Indication: Sepsis of Unknown Etiology  Target Concentration: 10-20  Day of Therapy: 2  Additional Antimicrobials: Piperacillin/Tazobactam    Pertinent Laboratory Values:   Temp: 100.4 °F (38 °C), Weight - Scale: (!) 143.8 kg (317 lb)  Recent Labs     08/05/24  0419 08/05/24  1822 08/05/24  2107 08/06/24  0227   CREATININE 1.60* 1.36*  --  1.68*   BUN 44*  --   --  43*   WBC 13.1  --  15.6* 8.3       Estimated Creatinine Clearance: 43 mL/min (A) (based on SCr of 1.68 mg/dL (H)).    Pertinent Cultures:  Culture Date Source Results   8/5 blood ngtd   8/5 sputum pending   MRSA Nasal Swab: Ordered by provider, awaiting results    Assessment:  Date/Time Current Dose Concentration Timing of Concentration (h) AUC   8/5 2,500mg x1 - - -   8/6 1,000mg x1 15.6 15      Note: Serum concentrations collected for AUC dosing may appear elevated if collected in close proximity to the dose administered, this is not necessarily an indication of toxicity    Plan:  Vancomycin dosing by levels at this time  Vancomycin 1,000mg today  Renal labs as indicated   Vancomycin concentration ordered a.m. labs tomorrow  Pharmacy will continue to monitor patient and adjust therapy as indicated    Thank you for the consult,  JEOVANY HAYDEN [facility]  8/6/2024

## 2024-08-06 NOTE — CONSULTS
Comprehensive Nutrition Assessment    Type and Reason for Visit:  Initial, Consult, NPO/Clear Liquid    Nutrition Recommendations/Plan:   Initiate tube feeding regimen:  FWF ONLY  Water Flushes: 100 mL q 1 hours (2400 mL total)  Goal Regimen Provides (with modulars):  0 kcal, 0g protein, 2400 ml free water, 0% RDIs  Continue thiamine supplementation. Pt at high risk for refeeding.   Daily wts.   Continue to monitor readiness for nutrition support, weight, labs, and plan of care during admission.      Malnutrition Assessment:  Malnutrition Status:  Insufficient data (vs at risk, NPO, vent) (08/06/24 1055)    Context:  Acute Illness       Nutrition History and Allergies:      Past Medical History:   Diagnosis Date    CHF (congestive heart failure) (HCC)     Diabetes (HCC)     Hyperlipemia     Sleep apnea      Weight Hx: Limited information available. Noted charted wt has no source, suspect taken from previous admit. Wt change: -23 lb (6.7%) x 1 year - not significant.   Wt Readings from Last 10 Encounters:   08/06/24 (!) 143.8 kg (317 lb)   08/02/24 (!) 143.8 kg (317 lb)   05/12/24 (!) 147.9 kg (326 lb)   08/09/23 (!) 154.5 kg (340 lb 11.2 oz)   05/29/23 (!) 166.2 kg (366 lb 6.5 oz)     NFPE: unable to perform NFPE. Food Allergies: NKFA    Nutrition Assessment:    Admitted from [facility] for dyspnea, found to have COVID 7/31. Transferred to [facility], requiring intubation 8/5. Pt seen for - Consult: TF ordering and management. Discussed care during interdisciplinary rounds. Per ICU, pt was at [facility] on BIPAP x 5 days; presumed NPO status. +COVID. Noted phos 0.4. Ordered for replacements. Pt at high risk for refeeding. Thiamine started. Pt hypernatremic. Discussed ordering FWF not D5 due to refeeding. Order  ml q1h per MD. Check lytes q12h.    Nutrition Related Findings:    Last BM (including prior to admit): 07/31/24. Pertinent Meds: pepcid, 30 mmol NaPhos, zosyn, precedex gtt, fentanyl gtt Pertinent Labs:  POC Glucose 169-171 mg/dl x 24 hrs, Na 149 H, BUN/Cr 43/1.68, GFR 31, Phos 0.4 L, Lactic acid 3.9 H  Edema: generalized +1 Wound Type: None       Current Nutrition Intake & Therapies:    Average Meal Intake: NPO     Diet NPO    Anthropometric Measures:  Height: 170.2 cm (5' 7\")  Ideal Body Weight (IBW): 135 lbs (61 kg)    Admission Body Weight: 143.8 kg (317 lb 0.3 oz)  Current Body Weight: 143.8 kg (317 lb 0.3 oz) (no source), 234.8 % IBW.    Current BMI (kg/m2): 49.6  Usual Body Weight: 147.9 kg (326 lb) (5/12/24)  % Weight Change (Calculated): -2.8  BMI Categories: Obese Class 3 (BMI 40.0 or greater)    Estimated Daily Nutrient Needs:  Energy Requirements Based On: Formula  Weight Used for Energy Requirements: Admission  Energy (kcal/day): 5109-0697 ([facility] 2010 x 0.9-1.1 vs 8446-2898 12-25 kcal/kg)  Weight Used for Protein Requirements: Ideal  Protein (g/day): 123-153 (2-2.5)  Method Used for Fluid Requirements: 1 ml/kcal  Fluid (ml/day): 0933-0709    Nutrition Diagnosis:   Predicted inadequate energy intake related to acute injury/trauma, impaired respiratory function as evidenced by NPO or clear liquid status due to medical condition, intubation (previously on BIPAP)    Nutrition Interventions:   Food and/or Nutrient Delivery: Continue NPO, Start Tube Feeding  Nutrition Education/Counseling: No recommendation at this time  Coordination of Nutrition Care: Interdisciplinary Rounds  Plan of Care discussed with: interdisciplinary team    Goals:     Goals: Meet at least 75% of estimated needs, by next RD assessment       Nutrition Monitoring and Evaluation:   Behavioral-Environmental Outcomes: None Identified  Food/Nutrient Intake Outcomes: Enteral Nutrition Intake/Tolerance, Vitamin/Mineral Intake  Physical Signs/Symptoms Outcomes: Biochemical Data, GI Status, Nausea or Vomiting, Fluid Status or Edema, Hemodynamic Status, Weight, Skin    Discharge Planning:    Too soon to determine     Megan Dockweiler, MS,

## 2024-08-06 NOTE — PROGRESS NOTES
attended the interdisciplinary rounds for Jasmin Pacheco, who is a 78 y.o.,female. Patient's Primary Language is: English.   According to the patient's EMR Congregational Affiliation is: Anabaptism.     The reason the Patient came to the hospital is:   Patient Active Problem List    Diagnosis Date Noted    Congestive heart failure (HCC) 08/06/2024    Acute respiratory failure with hypoxia and hypercarbia (HCC) 08/05/2024    Acute on chronic respiratory failure with hypoxia and hypercapnia (HCC) 07/31/2024    COPD exacerbation (HCC) 05/12/2024    Shortness of breath 08/03/2023    Acute respiratory failure (HCC) 05/25/2023    Acute on chronic diastolic heart failure (HCC) 05/25/2023    Hyperlipidemia 05/25/2023    Diabetes mellitus type 2 in obese 05/25/2023    Acute cystitis 05/25/2023          Plan:   participated and listen to the recommendations of the IDR team.    Patient intubated. Not following commands. No major incidents overnight.     Chaplains will continue to follow and will provide pastoral care on an as needed/requested basis.     recommends bedside caregivers page  on duty if patient shows signs of acute spiritual or emotional distress.     Evens Huddy  Staff   Spiritual Health   [phone]

## 2024-08-06 NOTE — CARE COORDINATION
Met with Patient at bedside. CM introduces self and explains role. Patient is alert and oriented x 4. Hipaa verified. Patient agrees to complete initial  assessment.     Patient is not medically ready, Pulmonology Following on Vent,Indwelling Mead NGT with feedings.   DCP: TBD SNF vrs Home with [facility], Referrals sent out in [facility].        08/06/24 1324   Service Assessment   Patient Orientation Sedated;Unable to Assess   Cognition Other (see comment)  (Sedated, on a vent, unable to assess at this time.)   History Provided By Child/Family   Primary Caregiver Self   Accompanied By/Relationship SELF   Support Systems Children;Family Members;/   Patient's Healthcare Decision Maker is: Legal Next of Kin   PCP Verified by CM Yes   Last Visit to PCP Within last 3 months   Prior Functional Level Assistance with the following:;Cooking;Housework;Shopping;Other (see comment)  (Transportation)   Current Functional Level Dressing;Bathing;Other (see comment);Mobility;Shopping;Assistance with the following:;Cooking;Housework;Toileting;Feeding  (Transportation)   Can patient return to prior living arrangement Unknown at present   Ability to make needs known: Unable   Family able to assist with home care needs: Yes   Would you like for me to discuss the discharge plan with any other family members/significant others, and if so, who? Yes  (Any living adult children are next of kin. Patient is a .)   Financial Resources Medicare;Medicaid   Community Resources Transportation   Social/Functional History   Lives With Son;Family   Type of Home House   Home Layout One level   Home Access Ramped entrance   Bathroom Shower/Tub Walk-in shower   Bathroom Toilet Standard   Bathroom Equipment Built-in shower seat;Grab bars in shower;Grab bars around toilet   Bathroom Accessibility Accessible   Home Equipment Walker - Rolling;Rollator;Wheelchair - Manual   Receives Help From Family   ADL Assistance Needs assistance

## 2024-08-07 ENCOUNTER — APPOINTMENT (OUTPATIENT)
Facility: HOSPITAL | Age: 79
DRG: 870 | End: 2024-08-07
Attending: INTERNAL MEDICINE
Payer: MEDICARE

## 2024-08-07 LAB
ANION GAP SERPL CALC-SCNC: 5 MMOL/L (ref 3–18)
APTT PPP: 116 SEC (ref 23–36.4)
APTT PPP: 98.8 SEC (ref 23–36.4)
APTT PPP: 99.4 SEC (ref 23–36.4)
ARTERIAL PATENCY WRIST A: POSITIVE
BACTERIA SPEC CULT: NORMAL
BASE EXCESS BLD CALC-SCNC: 14.8 MMOL/L
BASOPHILS # BLD: 0.1 K/UL (ref 0–0.1)
BASOPHILS NFR BLD: 0 % (ref 0–2)
BDY SITE: ABNORMAL
BODY TEMPERATURE: 98
BUN SERPL-MCNC: 50 MG/DL (ref 7–18)
BUN/CREAT SERPL: 23 (ref 12–20)
CALCIUM SERPL-MCNC: 8.5 MG/DL (ref 8.5–10.1)
CHLORIDE SERPL-SCNC: 104 MMOL/L (ref 100–111)
CO2 SERPL-SCNC: 37 MMOL/L (ref 21–32)
CREAT SERPL-MCNC: 2.18 MG/DL (ref 0.6–1.3)
DIFFERENTIAL METHOD BLD: ABNORMAL
EOSINOPHIL # BLD: 0.2 K/UL (ref 0–0.4)
EOSINOPHIL NFR BLD: 1 % (ref 0–5)
ERYTHROCYTE [DISTWIDTH] IN BLOOD BY AUTOMATED COUNT: 15.7 % (ref 11.6–14.5)
GAS FLOW.O2 O2 DELIVERY SYS: ABNORMAL
GAS FLOW.O2 SETTING OXYMISER: 14 BPM
GLUCOSE BLD STRIP.AUTO-MCNC: 221 MG/DL (ref 70–110)
GLUCOSE BLD STRIP.AUTO-MCNC: 238 MG/DL (ref 70–110)
GLUCOSE BLD STRIP.AUTO-MCNC: 279 MG/DL (ref 70–110)
GLUCOSE BLD STRIP.AUTO-MCNC: 287 MG/DL (ref 70–110)
GLUCOSE SERPL-MCNC: 271 MG/DL (ref 74–99)
HCO3 BLD-SCNC: 41.3 MMOL/L (ref 22–26)
HCT VFR BLD AUTO: 37.4 % (ref 35–45)
HGB BLD-MCNC: 11.5 G/DL (ref 12–16)
IMM GRANULOCYTES # BLD AUTO: 0.4 K/UL (ref 0–0.04)
IMM GRANULOCYTES NFR BLD AUTO: 2 % (ref 0–0.5)
LACTATE SERPL-SCNC: 2.5 MMOL/L (ref 0.4–2)
LACTATE SERPL-SCNC: 2.6 MMOL/L (ref 0.4–2)
LACTATE SERPL-SCNC: 2.6 MMOL/L (ref 0.4–2)
LACTATE SERPL-SCNC: 2.7 MMOL/L (ref 0.4–2)
LACTATE SERPL-SCNC: 2.8 MMOL/L (ref 0.4–2)
LYMPHOCYTES # BLD: 1.9 K/UL (ref 0.9–3.6)
LYMPHOCYTES NFR BLD: 10 % (ref 21–52)
MAGNESIUM SERPL-MCNC: 2.6 MG/DL (ref 1.6–2.6)
MCH RBC QN AUTO: 28.3 PG (ref 24–34)
MCHC RBC AUTO-ENTMCNC: 30.7 G/DL (ref 31–37)
MCV RBC AUTO: 92.1 FL (ref 78–100)
MONOCYTES # BLD: 0.6 K/UL (ref 0.05–1.2)
MONOCYTES NFR BLD: 3 % (ref 3–10)
NEUTS SEG # BLD: 15.8 K/UL (ref 1.8–8)
NEUTS SEG NFR BLD: 83 % (ref 40–73)
NRBC # BLD: 0.06 K/UL (ref 0–0.01)
NRBC BLD-RTO: 0.3 PER 100 WBC
O2/TOTAL GAS SETTING VFR VENT: 80 %
PCO2 BLD: 56.7 MMHG (ref 35–45)
PEEP RESPIRATORY: 8 CMH2O
PH BLD: 7.47 (ref 7.35–7.45)
PHOSPHATE SERPL-MCNC: 2.9 MG/DL (ref 2.5–4.9)
PLATELET # BLD AUTO: 224 K/UL (ref 135–420)
PMV BLD AUTO: 10.3 FL (ref 9.2–11.8)
PO2 BLD: 206 MMHG (ref 80–100)
POTASSIUM SERPL-SCNC: 4 MMOL/L (ref 3.5–5.5)
RBC # BLD AUTO: 4.06 M/UL (ref 4.2–5.3)
SAO2 % BLD: 99.8 % (ref 92–97)
SERVICE CMNT-IMP: ABNORMAL
SERVICE CMNT-IMP: NORMAL
SERVICE CMNT-IMP: NORMAL
SODIUM SERPL-SCNC: 146 MMOL/L (ref 136–145)
SPECIMEN TYPE: ABNORMAL
VANCOMYCIN SERPL-MCNC: 14.2 UG/ML (ref 5–40)
VENTILATION MODE VENT: ABNORMAL
VT SETTING VENT: 400 ML
WBC # BLD AUTO: 19 K/UL (ref 4.6–13.2)

## 2024-08-07 PROCEDURE — 80048 BASIC METABOLIC PNL TOTAL CA: CPT

## 2024-08-07 PROCEDURE — 2700000000 HC OXYGEN THERAPY PER DAY

## 2024-08-07 PROCEDURE — 2580000003 HC RX 258: Performed by: INTERNAL MEDICINE

## 2024-08-07 PROCEDURE — 82803 BLOOD GASES ANY COMBINATION: CPT

## 2024-08-07 PROCEDURE — 6370000000 HC RX 637 (ALT 250 FOR IP): Performed by: INTERNAL MEDICINE

## 2024-08-07 PROCEDURE — 2500000003 HC RX 250 WO HCPCS: Performed by: NURSE PRACTITIONER

## 2024-08-07 PROCEDURE — 6360000002 HC RX W HCPCS: Performed by: PHYSICIAN ASSISTANT

## 2024-08-07 PROCEDURE — 94003 VENT MGMT INPAT SUBQ DAY: CPT

## 2024-08-07 PROCEDURE — 6370000000 HC RX 637 (ALT 250 FOR IP): Performed by: NURSE PRACTITIONER

## 2024-08-07 PROCEDURE — 99291 CRITICAL CARE FIRST HOUR: CPT | Performed by: INTERNAL MEDICINE

## 2024-08-07 PROCEDURE — 94640 AIRWAY INHALATION TREATMENT: CPT

## 2024-08-07 PROCEDURE — 2500000003 HC RX 250 WO HCPCS: Performed by: INTERNAL MEDICINE

## 2024-08-07 PROCEDURE — 94761 N-INVAS EAR/PLS OXIMETRY MLT: CPT

## 2024-08-07 PROCEDURE — 2500000003 HC RX 250 WO HCPCS: Performed by: PHYSICIAN ASSISTANT

## 2024-08-07 PROCEDURE — 6370000000 HC RX 637 (ALT 250 FOR IP): Performed by: PHYSICIAN ASSISTANT

## 2024-08-07 PROCEDURE — 2580000003 HC RX 258: Performed by: PHYSICIAN ASSISTANT

## 2024-08-07 PROCEDURE — 84100 ASSAY OF PHOSPHORUS: CPT

## 2024-08-07 PROCEDURE — 85730 THROMBOPLASTIN TIME PARTIAL: CPT

## 2024-08-07 PROCEDURE — APPSS15 APP SPLIT SHARED TIME 0-15 MINUTES: Performed by: PHYSICIAN ASSISTANT

## 2024-08-07 PROCEDURE — 80202 ASSAY OF VANCOMYCIN: CPT

## 2024-08-07 PROCEDURE — 36415 COLL VENOUS BLD VENIPUNCTURE: CPT

## 2024-08-07 PROCEDURE — 83605 ASSAY OF LACTIC ACID: CPT

## 2024-08-07 PROCEDURE — 6360000002 HC RX W HCPCS: Performed by: INTERNAL MEDICINE

## 2024-08-07 PROCEDURE — 6360000002 HC RX W HCPCS: Performed by: NURSE PRACTITIONER

## 2024-08-07 PROCEDURE — 51798 US URINE CAPACITY MEASURE: CPT

## 2024-08-07 PROCEDURE — 2000000000 HC ICU R&B

## 2024-08-07 PROCEDURE — 36600 WITHDRAWAL OF ARTERIAL BLOOD: CPT

## 2024-08-07 PROCEDURE — 83735 ASSAY OF MAGNESIUM: CPT

## 2024-08-07 PROCEDURE — 85025 COMPLETE CBC W/AUTO DIFF WBC: CPT

## 2024-08-07 PROCEDURE — 71045 X-RAY EXAM CHEST 1 VIEW: CPT

## 2024-08-07 PROCEDURE — 82962 GLUCOSE BLOOD TEST: CPT

## 2024-08-07 RX ORDER — INSULIN GLARGINE 100 [IU]/ML
20 INJECTION, SOLUTION SUBCUTANEOUS DAILY
Status: DISCONTINUED | OUTPATIENT
Start: 2024-08-08 | End: 2024-08-08

## 2024-08-07 RX ORDER — BISACODYL 5 MG/1
5 TABLET, DELAYED RELEASE ORAL ONCE
Status: DISCONTINUED | OUTPATIENT
Start: 2024-08-07 | End: 2024-08-07

## 2024-08-07 RX ORDER — BISACODYL 10 MG
10 SUPPOSITORY, RECTAL RECTAL DAILY PRN
Status: DISCONTINUED | OUTPATIENT
Start: 2024-08-07 | End: 2024-08-07

## 2024-08-07 RX ORDER — POLYETHYLENE GLYCOL 3350 17 G/17G
34 POWDER, FOR SOLUTION ORAL 2 TIMES DAILY
Status: DISCONTINUED | OUTPATIENT
Start: 2024-08-07 | End: 2024-08-24 | Stop reason: HOSPADM

## 2024-08-07 RX ORDER — INSULIN GLARGINE 100 [IU]/ML
10 INJECTION, SOLUTION SUBCUTANEOUS DAILY
Status: DISCONTINUED | OUTPATIENT
Start: 2024-08-07 | End: 2024-08-07

## 2024-08-07 RX ORDER — BISACODYL 10 MG
10 SUPPOSITORY, RECTAL RECTAL ONCE
Status: COMPLETED | OUTPATIENT
Start: 2024-08-07 | End: 2024-08-07

## 2024-08-07 RX ADMIN — DEXMEDETOMIDINE HYDROCHLORIDE 0.6 MCG/KG/HR: 4 INJECTION, SOLUTION INTRAVENOUS at 23:07

## 2024-08-07 RX ADMIN — BISACODYL 10 MG: 10 SUPPOSITORY RECTAL at 10:59

## 2024-08-07 RX ADMIN — IPRATROPIUM BROMIDE AND ALBUTEROL SULFATE 1 DOSE: .5; 3 SOLUTION RESPIRATORY (INHALATION) at 00:30

## 2024-08-07 RX ADMIN — IPRATROPIUM BROMIDE AND ALBUTEROL SULFATE 1 DOSE: .5; 3 SOLUTION RESPIRATORY (INHALATION) at 12:28

## 2024-08-07 RX ADMIN — POLYETHYLENE GLYCOL 3350 34 G: 17 POWDER, FOR SOLUTION ORAL at 10:59

## 2024-08-07 RX ADMIN — IPRATROPIUM BROMIDE AND ALBUTEROL SULFATE 1 DOSE: .5; 3 SOLUTION RESPIRATORY (INHALATION) at 03:34

## 2024-08-07 RX ADMIN — INSULIN LISPRO 8 UNITS: 100 INJECTION, SOLUTION INTRAVENOUS; SUBCUTANEOUS at 23:55

## 2024-08-07 RX ADMIN — SODIUM CHLORIDE, PRESERVATIVE FREE 10 ML: 5 INJECTION INTRAVENOUS at 08:53

## 2024-08-07 RX ADMIN — 0.12% CHLORHEXIDINE GLUCONATE 15 ML: 1.2 RINSE ORAL at 08:53

## 2024-08-07 RX ADMIN — METHYLPREDNISOLONE SODIUM SUCCINATE 40 MG: 40 INJECTION, POWDER, LYOPHILIZED, FOR SOLUTION INTRAMUSCULAR; INTRAVENOUS at 10:59

## 2024-08-07 RX ADMIN — INSULIN GLARGINE 10 UNITS: 100 INJECTION, SOLUTION SUBCUTANEOUS at 08:53

## 2024-08-07 RX ADMIN — FOLIC ACID 1 MG: 1 TABLET ORAL at 08:48

## 2024-08-07 RX ADMIN — DEXMEDETOMIDINE HYDROCHLORIDE 0.4 MCG/KG/HR: 4 INJECTION, SOLUTION INTRAVENOUS at 10:42

## 2024-08-07 RX ADMIN — INSULIN LISPRO 4 UNITS: 100 INJECTION, SOLUTION INTRAVENOUS; SUBCUTANEOUS at 13:34

## 2024-08-07 RX ADMIN — THIAMINE HYDROCHLORIDE 200 MG: 100 INJECTION, SOLUTION INTRAMUSCULAR; INTRAVENOUS at 13:45

## 2024-08-07 RX ADMIN — METHYLPREDNISOLONE SODIUM SUCCINATE 40 MG: 40 INJECTION, POWDER, LYOPHILIZED, FOR SOLUTION INTRAMUSCULAR; INTRAVENOUS at 21:30

## 2024-08-07 RX ADMIN — INSULIN LISPRO 8 UNITS: 100 INJECTION, SOLUTION INTRAVENOUS; SUBCUTANEOUS at 05:55

## 2024-08-07 RX ADMIN — 0.12% CHLORHEXIDINE GLUCONATE 15 ML: 1.2 RINSE ORAL at 21:30

## 2024-08-07 RX ADMIN — SODIUM CHLORIDE, PRESERVATIVE FREE 10 ML: 5 INJECTION INTRAVENOUS at 21:30

## 2024-08-07 RX ADMIN — IPRATROPIUM BROMIDE AND ALBUTEROL SULFATE 1 DOSE: .5; 3 SOLUTION RESPIRATORY (INHALATION) at 07:45

## 2024-08-07 RX ADMIN — VANCOMYCIN HYDROCHLORIDE 1500 MG: 10 INJECTION, POWDER, LYOPHILIZED, FOR SOLUTION INTRAVENOUS at 13:45

## 2024-08-07 RX ADMIN — PIPERACILLIN AND TAZOBACTAM 4500 MG: 4; .5 INJECTION, POWDER, FOR SOLUTION INTRAVENOUS; PARENTERAL at 18:16

## 2024-08-07 RX ADMIN — DEXMEDETOMIDINE HYDROCHLORIDE 0.6 MCG/KG/HR: 4 INJECTION, SOLUTION INTRAVENOUS at 18:23

## 2024-08-07 RX ADMIN — PIPERACILLIN AND TAZOBACTAM 4500 MG: 4; .5 INJECTION, POWDER, FOR SOLUTION INTRAVENOUS; PARENTERAL at 02:53

## 2024-08-07 RX ADMIN — IPRATROPIUM BROMIDE AND ALBUTEROL SULFATE 1 DOSE: .5; 3 SOLUTION RESPIRATORY (INHALATION) at 20:03

## 2024-08-07 RX ADMIN — HEPARIN SODIUM 12 UNITS/KG/HR: 10000 INJECTION, SOLUTION INTRAVENOUS at 13:36

## 2024-08-07 RX ADMIN — POLYETHYLENE GLYCOL 3350 34 G: 17 POWDER, FOR SOLUTION ORAL at 21:30

## 2024-08-07 RX ADMIN — Medication 100 MCG/HR: at 18:26

## 2024-08-07 RX ADMIN — INSULIN LISPRO 8 UNITS: 100 INJECTION, SOLUTION INTRAVENOUS; SUBCUTANEOUS at 18:38

## 2024-08-07 RX ADMIN — Medication 75 MCG/HR: at 06:49

## 2024-08-07 RX ADMIN — METHYLPREDNISOLONE SODIUM SUCCINATE 40 MG: 40 INJECTION, POWDER, LYOPHILIZED, FOR SOLUTION INTRAMUSCULAR; INTRAVENOUS at 18:26

## 2024-08-07 RX ADMIN — THIAMINE HYDROCHLORIDE 200 MG: 100 INJECTION, SOLUTION INTRAMUSCULAR; INTRAVENOUS at 21:30

## 2024-08-07 RX ADMIN — PIPERACILLIN AND TAZOBACTAM 4500 MG: 4; .5 INJECTION, POWDER, FOR SOLUTION INTRAVENOUS; PARENTERAL at 09:01

## 2024-08-07 RX ADMIN — FAMOTIDINE 20 MG: 10 INJECTION, SOLUTION INTRAVENOUS at 08:47

## 2024-08-07 RX ADMIN — THIAMINE HYDROCHLORIDE 200 MG: 100 INJECTION, SOLUTION INTRAMUSCULAR; INTRAVENOUS at 08:47

## 2024-08-07 RX ADMIN — IPRATROPIUM BROMIDE AND ALBUTEROL SULFATE 1 DOSE: .5; 3 SOLUTION RESPIRATORY (INHALATION) at 16:57

## 2024-08-07 ASSESSMENT — PAIN SCALES - GENERAL
PAINLEVEL_OUTOF10: 0

## 2024-08-07 ASSESSMENT — PULMONARY FUNCTION TESTS
PIF_VALUE: 32
PIF_VALUE: 27
PIF_VALUE: 28
PIF_VALUE: 27
PIF_VALUE: 28
PIF_VALUE: 28

## 2024-08-07 NOTE — PROGRESS NOTES
Nutrition Note    Admitted from [facility] for dyspnea, found to have COVID . Transferred to [facility], requiring intubation . +COVID. Per ICU, pt was at [facility] on BIPAP x 5 days; presumed NPO status. Pt NPO due to hypophosphatemia; now wnl. FWF only ordered. Discussed care during interdisciplinary rounds. Per RN, pt had no BM since , plan to add bowel regimen. Okay to initiate TF, keep  ml q1h per MD. Will monitor tolerance.     Nutrition Related Findings:   Pertinent Meds: pepcid, folic acid, lantus, humalog, zosyn, thiamine, precedex gtt, fentanyl gtt  Pertinent Labs: Na 146 H (trending down), K 4 wnl, Mg 2.6 wnl, Phos 2.9 wnl (trending up), BUN/Cr 50/2.18, GFR 23, Lactic acid 2.5 H (trending down)    Last BM: 24  Skin: Wound Type: None  Edema: generalized, extremity +1      Estimated Nutrition Needs: 141.7 kg, IBW 61 kg  1709 - 2088 kcals/day ([facility] 2010 x 0.9-1.1)  Minute Volume (L/min): 5.35 Liters  Temp: 99.3 °F (37.4 °C)  Temp (24hrs), Av.1 °F (37.8 °C), Min:99.3 °F (37.4 °C), Max:101.1 °F (38.4 °C)    1700 - 3543 kcals/day (12-25 kcals/kg)  123 - 153 g protein (2-2.5 g/kg IBW)  1709 - 2088 ml fluid (1 ml/kcal)    Nutrition Recommendations/Plan:   Modify tube feeding regimen:   Formula: Glucerna 1.5 (Diabetic)  Start at 10 ml/hr  Advance as tolerated by 10 ml q 8 hours  Goal Rate: 60 ml/hr x 20 hours (for anticipation of holding tube feeds for standard nursing care)  Water Flushes: 100 mL q 1 hours (2400 mL total)  Modular(s): Prosource BID  Goal Regimen Provides (with modulars):  1960 kcal, 139g protein, 3312 ml free water, 100% RDIs  Continue thiamine supplementation.   Continue to monitor tolerance of EN, weight, labs, and plan of care during admission.      Electronically signed by Megan Dockweiler, MS, RD, CNSC on 2024 at 10:10 AM.    Contact: [phone]

## 2024-08-07 NOTE — PROGRESS NOTES
attended the interdisciplinary rounds for Jasmin Pacheco, who is a 78 y.o.,female. Patient's Primary Language is: English.   According to the patient's EMR Catholic Affiliation is: Adventism.     The reason the Patient came to the hospital is:   Patient Active Problem List    Diagnosis Date Noted    Congestive heart failure (HCC) 08/06/2024    Acute respiratory failure with hypoxia and hypercarbia (HCC) 08/05/2024    Acute on chronic respiratory failure with hypoxia and hypercapnia (HCC) 07/31/2024    COPD exacerbation (HCC) 05/12/2024    Shortness of breath 08/03/2023    Acute respiratory failure (HCC) 05/25/2023    Acute on chronic diastolic heart failure (HCC) 05/25/2023    Hyperlipidemia 05/25/2023    Diabetes mellitus type 2 in obese 05/25/2023    Acute cystitis 05/25/2023          Plan:   participated and listen to the recommendations of the IDR team.    Patient intubated. No major concerns or incidents overnight.     Chaplains will continue to follow and will provide pastoral care on an as needed/requested basis.     recommends bedside caregivers page  on duty if patient shows signs of acute spiritual or emotional distress.     Evens Peachland  Staff   Spiritual Health   [phone]

## 2024-08-07 NOTE — PROGRESS NOTES
No SBT at this time     08/07/24 0809   Weaning Parameters   Spontaneous Breathing Trial Complete No (comment)  (Sedated, high FiO2/Peep)

## 2024-08-07 NOTE — PLAN OF CARE
Problem: Safety - Adult  Goal: Free from fall injury  Outcome: Progressing     Problem: Pain  Goal: Verbalizes/displays adequate comfort level or baseline comfort level  Outcome: Progressing  Flowsheets (Taken 8/7/2024 0800)  Verbalizes/displays adequate comfort level or baseline comfort level:   Encourage patient to monitor pain and request assistance   Assess pain using appropriate pain scale   Administer analgesics based on type and severity of pain and evaluate response   Implement non-pharmacological measures as appropriate and evaluate response   Notify Licensed Independent Practitioner if interventions unsuccessful or patient reports new pain   Consider cultural and social influences on pain and pain management     Problem: Safety - Medical Restraint  Goal: Remains free of injury from restraints (Restraint for Interference with Medical Device)  Description: INTERVENTIONS:  1. Determine that other, less restrictive measures have been tried or would not be effective before applying the restraint  2. Evaluate the patient's condition at the time of restraint application  3. Inform patient/family regarding the reason for restraint  4. Q2H: Monitor safety, psychosocial status, comfort, nutrition and hydration  Outcome: Progressing  Flowsheets  Taken 8/7/2024 1000  Remains free of injury from restraints (restraint for interference with medical device):   Evaluate the patient's condition at the time of restraint application   Determine that other, less restrictive measures have been tried or would not be effective before applying the restraint   Inform patient/family regarding the reason for restraint   Every 2 hours: Monitor safety, psychosocial status, comfort, nutrition and hydration  Taken 8/7/2024 0800  Remains free of injury from restraints (restraint for interference with medical device):   Determine that other, less restrictive measures have been tried or would not be effective before applying the

## 2024-08-07 NOTE — PROGRESS NOTES
[facility]  Pulmonary, Critical Care, and Sleep Medicine    Name: Jasmin Pacheco MRN: [id number]   : 1945 Hospital: [facility]   Date: 2024        Critical Care History and Physical    IMPRESSION:   Acute hypoxic and hypercapnic respiratory failure requiring intubation due to acute heart failure and possibly mild ARDS with a P to F ratio of 250s as patient has been diuresed with minimal improvement in oxygenation  COVID pneumonia  Severe sepsis due to above, POA, possible bacterial infection superimposed on COVID-pneumonia  Metabolic encephalopathy  Acute on chronic diastolic heart failure (ECHO 24), grade 2 diastolic dysfunction with an ejection fraction of 55 to 60%, mod pHTN  Contraction alkalosis due to diuresis  LEA on CKD 3B  Morbid obesity, Body mass index is 48.92 kg/m².,  T2DM   Hx PE - on eliquis outpatient w/ VQ scan \"high probability\" of PE 24     Patient Active Problem List   Diagnosis    Acute respiratory failure (HCC)    Acute on chronic diastolic heart failure (HCC)    Hyperlipidemia    Diabetes mellitus type 2 in obese    Acute cystitis    Shortness of breath    COPD exacerbation (HCC)    Acute on chronic respiratory failure with hypoxia and hypercapnia (HCC)    Acute respiratory failure with hypoxia and hypercarbia (HCC)    Congestive heart failure (HCC)      RECOMMENDATIONS:   Neuro: Titrate sedation to RASS of 0 to -1. PRN for breakthrough sedation needs.   Pulm: Titrate FiO2 for goal SPO2> 90%,VAP prevention bundle, head of the bed at 30' all times.Daily sedation holiday and assessment for weaning with SBT as tolerated.  Solu-Medrol scheduled  PRN duonebs.  CVS : Echo reviewed, intermittent diuresis.  Diamox as needed for increasing bicarb  GI: SUP, Trend LFTs, Zofran PRN for N/V, Diet NPO  ADULT TUBE FEEDING; Orogastric; Diabetic; Continuous; 10; Yes; 10; Q 8 hours; 60; 100; Q 1 hour; Protein; 2 Doses; Daily  Renal:  Trend Renal indices,

## 2024-08-07 NOTE — PROGRESS NOTES
[facility]   Pharmacy Pharmacokinetic Monitoring Service - Vancomycin    Indication: sepsis  Goal level: 10-20 mg/L  Day of Therapy: 3  Additional Antimicrobials: pip-tazo    Pertinent Laboratory Values:   Wt Readings from Last 1 Encounters:   08/07/24 (!) 141.7 kg (312 lb 6.3 oz)     Temp Readings from Last 1 Encounters:   08/07/24 99.7 °F (37.6 °C) (Axillary)     Estimated Creatinine Clearance: 32 mL/min (A) (based on SCr of 2.18 mg/dL (H)).    Recent Labs     08/06/24  0227 08/06/24  1904 08/07/24  0047   CREATININE 1.68* 2.33* 2.18*   BUN 43* 49* 50*   WBC 8.3  --  19.0*     Pertinent Cultures:  Date Source Results   8/5 blood NGTD   8/5 sputum pending   8/6 urine pending   MRSA Nasal Swab: pending    Assessment:  Date Current Dose Level (mg/L) Timing of Level (h)   8/5 2,500 mg x1 - -   8/6 - 15.6 15   8/7 1,500 mg x1 14.2 26     Plan:  Dose by level  Dose: 1,500 mg x1  No level ordered at this time  Pharmacy will continue to monitor patient and adjust therapy as indicated    Thank you for the consult,  Pete Noland RPH  8/7/2024

## 2024-08-08 ENCOUNTER — APPOINTMENT (OUTPATIENT)
Facility: HOSPITAL | Age: 79
DRG: 870 | End: 2024-08-08
Attending: INTERNAL MEDICINE
Payer: MEDICARE

## 2024-08-08 LAB
ANION GAP SERPL CALC-SCNC: 6 MMOL/L (ref 3–18)
ANION GAP SERPL CALC-SCNC: 8 MMOL/L (ref 3–18)
APTT PPP: 109.1 SEC (ref 23–36.4)
ARTERIAL PATENCY WRIST A: POSITIVE
BACTERIA SPEC CULT: NORMAL
BASE EXCESS BLD CALC-SCNC: 9.3 MMOL/L
BASOPHILS # BLD: 0 K/UL (ref 0–0.1)
BASOPHILS NFR BLD: 0 % (ref 0–2)
BDY SITE: ABNORMAL
BUN SERPL-MCNC: 48 MG/DL (ref 7–18)
BUN SERPL-MCNC: 52 MG/DL (ref 7–18)
BUN/CREAT SERPL: 28 (ref 12–20)
BUN/CREAT SERPL: 29 (ref 12–20)
CALCIUM SERPL-MCNC: 8.1 MG/DL (ref 8.5–10.1)
CALCIUM SERPL-MCNC: 8.6 MG/DL (ref 8.5–10.1)
CHLORIDE SERPL-SCNC: 101 MMOL/L (ref 100–111)
CHLORIDE SERPL-SCNC: 104 MMOL/L (ref 100–111)
CO2 SERPL-SCNC: 28 MMOL/L (ref 21–32)
CO2 SERPL-SCNC: 34 MMOL/L (ref 21–32)
CREAT SERPL-MCNC: 1.63 MG/DL (ref 0.6–1.3)
CREAT SERPL-MCNC: 1.87 MG/DL (ref 0.6–1.3)
DIFFERENTIAL METHOD BLD: ABNORMAL
EOSINOPHIL # BLD: 0 K/UL (ref 0–0.4)
EOSINOPHIL NFR BLD: 0 % (ref 0–5)
ERYTHROCYTE [DISTWIDTH] IN BLOOD BY AUTOMATED COUNT: 15.6 % (ref 11.6–14.5)
GAS FLOW.O2 O2 DELIVERY SYS: ABNORMAL
GAS FLOW.O2 SETTING OXYMISER: 12 BPM
GLUCOSE BLD STRIP.AUTO-MCNC: 311 MG/DL (ref 70–110)
GLUCOSE BLD STRIP.AUTO-MCNC: 322 MG/DL (ref 70–110)
GLUCOSE BLD STRIP.AUTO-MCNC: 334 MG/DL (ref 70–110)
GLUCOSE BLD STRIP.AUTO-MCNC: 361 MG/DL (ref 70–110)
GLUCOSE SERPL-MCNC: 323 MG/DL (ref 74–99)
GLUCOSE SERPL-MCNC: 332 MG/DL (ref 74–99)
GRAM STN SPEC: NORMAL
HCO3 BLD-SCNC: 37.2 MMOL/L (ref 22–26)
HCT VFR BLD AUTO: 36.6 % (ref 35–45)
HGB BLD-MCNC: 11.5 G/DL (ref 12–16)
IMM GRANULOCYTES # BLD AUTO: 0.6 K/UL (ref 0–0.04)
IMM GRANULOCYTES NFR BLD AUTO: 3 % (ref 0–0.5)
LACTATE SERPL-SCNC: 1.8 MMOL/L (ref 0.4–2)
LACTATE SERPL-SCNC: 2.3 MMOL/L (ref 0.4–2)
LACTATE SERPL-SCNC: 2.3 MMOL/L (ref 0.4–2)
LACTATE SERPL-SCNC: 2.4 MMOL/L (ref 0.4–2)
LYMPHOCYTES # BLD: 1 K/UL (ref 0.9–3.6)
LYMPHOCYTES NFR BLD: 6 % (ref 21–52)
MAGNESIUM SERPL-MCNC: 2.2 MG/DL (ref 1.6–2.6)
MAGNESIUM SERPL-MCNC: 2.6 MG/DL (ref 1.6–2.6)
MCH RBC QN AUTO: 28.9 PG (ref 24–34)
MCHC RBC AUTO-ENTMCNC: 31.4 G/DL (ref 31–37)
MCV RBC AUTO: 92 FL (ref 78–100)
MONOCYTES # BLD: 0.2 K/UL (ref 0.05–1.2)
MONOCYTES NFR BLD: 1 % (ref 3–10)
NEUTS SEG # BLD: 15.6 K/UL (ref 1.8–8)
NEUTS SEG NFR BLD: 90 % (ref 40–73)
NRBC # BLD: 0.03 K/UL (ref 0–0.01)
NRBC BLD-RTO: 0.2 PER 100 WBC
O2/TOTAL GAS SETTING VFR VENT: 50 %
PCO2 BLD: 64.4 MMHG (ref 35–45)
PEEP RESPIRATORY: 10 CMH2O
PH BLD: 7.37 (ref 7.35–7.45)
PHOSPHATE SERPL-MCNC: 2 MG/DL (ref 2.5–4.9)
PHOSPHATE SERPL-MCNC: 3.4 MG/DL (ref 2.5–4.9)
PLATELET # BLD AUTO: 218 K/UL (ref 135–420)
PMV BLD AUTO: 10.7 FL (ref 9.2–11.8)
PO2 BLD: 96 MMHG (ref 80–100)
POTASSIUM SERPL-SCNC: 4 MMOL/L (ref 3.5–5.5)
POTASSIUM SERPL-SCNC: 4.2 MMOL/L (ref 3.5–5.5)
RBC # BLD AUTO: 3.98 M/UL (ref 4.2–5.3)
SAO2 % BLD: 96.9 % (ref 92–97)
SERVICE CMNT-IMP: ABNORMAL
SERVICE CMNT-IMP: NORMAL
SODIUM SERPL-SCNC: 140 MMOL/L (ref 136–145)
SODIUM SERPL-SCNC: 141 MMOL/L (ref 136–145)
SPECIMEN TYPE: ABNORMAL
TSH SERPL DL<=0.05 MIU/L-ACNC: 0.67 UIU/ML (ref 0.36–3.74)
VENTILATION MODE VENT: ABNORMAL
VT SETTING VENT: 400 ML
WBC # BLD AUTO: 17.4 K/UL (ref 4.6–13.2)

## 2024-08-08 PROCEDURE — 85025 COMPLETE CBC W/AUTO DIFF WBC: CPT

## 2024-08-08 PROCEDURE — 2580000003 HC RX 258: Performed by: INTERNAL MEDICINE

## 2024-08-08 PROCEDURE — 74018 RADEX ABDOMEN 1 VIEW: CPT

## 2024-08-08 PROCEDURE — 94640 AIRWAY INHALATION TREATMENT: CPT

## 2024-08-08 PROCEDURE — 82962 GLUCOSE BLOOD TEST: CPT

## 2024-08-08 PROCEDURE — 6360000002 HC RX W HCPCS

## 2024-08-08 PROCEDURE — 94761 N-INVAS EAR/PLS OXIMETRY MLT: CPT

## 2024-08-08 PROCEDURE — 6370000000 HC RX 637 (ALT 250 FOR IP): Performed by: INTERNAL MEDICINE

## 2024-08-08 PROCEDURE — 6370000000 HC RX 637 (ALT 250 FOR IP): Performed by: NURSE PRACTITIONER

## 2024-08-08 PROCEDURE — 2700000000 HC OXYGEN THERAPY PER DAY

## 2024-08-08 PROCEDURE — 80048 BASIC METABOLIC PNL TOTAL CA: CPT

## 2024-08-08 PROCEDURE — 6370000000 HC RX 637 (ALT 250 FOR IP): Performed by: PHYSICIAN ASSISTANT

## 2024-08-08 PROCEDURE — 2580000003 HC RX 258

## 2024-08-08 PROCEDURE — 94003 VENT MGMT INPAT SUBQ DAY: CPT

## 2024-08-08 PROCEDURE — 2580000003 HC RX 258: Performed by: PHYSICIAN ASSISTANT

## 2024-08-08 PROCEDURE — 83735 ASSAY OF MAGNESIUM: CPT

## 2024-08-08 PROCEDURE — 2500000003 HC RX 250 WO HCPCS: Performed by: INTERNAL MEDICINE

## 2024-08-08 PROCEDURE — 83605 ASSAY OF LACTIC ACID: CPT

## 2024-08-08 PROCEDURE — 36415 COLL VENOUS BLD VENIPUNCTURE: CPT

## 2024-08-08 PROCEDURE — 2500000003 HC RX 250 WO HCPCS: Performed by: PHYSICIAN ASSISTANT

## 2024-08-08 PROCEDURE — 6360000002 HC RX W HCPCS: Performed by: PHYSICIAN ASSISTANT

## 2024-08-08 PROCEDURE — 84100 ASSAY OF PHOSPHORUS: CPT

## 2024-08-08 PROCEDURE — 82803 BLOOD GASES ANY COMBINATION: CPT

## 2024-08-08 PROCEDURE — 6360000002 HC RX W HCPCS: Performed by: NURSE PRACTITIONER

## 2024-08-08 PROCEDURE — 99291 CRITICAL CARE FIRST HOUR: CPT | Performed by: INTERNAL MEDICINE

## 2024-08-08 PROCEDURE — 36600 WITHDRAWAL OF ARTERIAL BLOOD: CPT

## 2024-08-08 PROCEDURE — 2500000003 HC RX 250 WO HCPCS: Performed by: NURSE PRACTITIONER

## 2024-08-08 PROCEDURE — 85730 THROMBOPLASTIN TIME PARTIAL: CPT

## 2024-08-08 PROCEDURE — 84443 ASSAY THYROID STIM HORMONE: CPT

## 2024-08-08 PROCEDURE — 2000000000 HC ICU R&B

## 2024-08-08 PROCEDURE — 71045 X-RAY EXAM CHEST 1 VIEW: CPT

## 2024-08-08 RX ORDER — INSULIN GLARGINE 100 [IU]/ML
30 INJECTION, SOLUTION SUBCUTANEOUS DAILY
Status: DISCONTINUED | OUTPATIENT
Start: 2024-08-09 | End: 2024-08-10

## 2024-08-08 RX ORDER — INSULIN GLARGINE 100 [IU]/ML
5 INJECTION, SOLUTION SUBCUTANEOUS ONCE
Status: COMPLETED | OUTPATIENT
Start: 2024-08-08 | End: 2024-08-08

## 2024-08-08 RX ORDER — INSULIN GLARGINE 100 [IU]/ML
10 INJECTION, SOLUTION SUBCUTANEOUS ONCE
Status: COMPLETED | OUTPATIENT
Start: 2024-08-09 | End: 2024-08-08

## 2024-08-08 RX ADMIN — IPRATROPIUM BROMIDE AND ALBUTEROL SULFATE 1 DOSE: .5; 3 SOLUTION RESPIRATORY (INHALATION) at 03:44

## 2024-08-08 RX ADMIN — METHYLPREDNISOLONE SODIUM SUCCINATE 40 MG: 40 INJECTION, POWDER, LYOPHILIZED, FOR SOLUTION INTRAMUSCULAR; INTRAVENOUS at 05:12

## 2024-08-08 RX ADMIN — 0.12% CHLORHEXIDINE GLUCONATE 15 ML: 1.2 RINSE ORAL at 09:53

## 2024-08-08 RX ADMIN — INSULIN GLARGINE 10 UNITS: 100 INJECTION, SOLUTION SUBCUTANEOUS at 23:47

## 2024-08-08 RX ADMIN — FAMOTIDINE 20 MG: 10 INJECTION, SOLUTION INTRAVENOUS at 09:53

## 2024-08-08 RX ADMIN — Medication 75 MCG/HR: at 17:05

## 2024-08-08 RX ADMIN — HEPARIN SODIUM 12 UNITS/KG/HR: 10000 INJECTION, SOLUTION INTRAVENOUS at 21:38

## 2024-08-08 RX ADMIN — METHYLPREDNISOLONE SODIUM SUCCINATE 40 MG: 40 INJECTION, POWDER, LYOPHILIZED, FOR SOLUTION INTRAMUSCULAR; INTRAVENOUS at 17:15

## 2024-08-08 RX ADMIN — THIAMINE HYDROCHLORIDE 200 MG: 100 INJECTION, SOLUTION INTRAMUSCULAR; INTRAVENOUS at 15:17

## 2024-08-08 RX ADMIN — PIPERACILLIN AND TAZOBACTAM 4500 MG: 4; .5 INJECTION, POWDER, FOR SOLUTION INTRAVENOUS; PARENTERAL at 09:54

## 2024-08-08 RX ADMIN — IPRATROPIUM BROMIDE AND ALBUTEROL SULFATE 1 DOSE: .5; 3 SOLUTION RESPIRATORY (INHALATION) at 07:46

## 2024-08-08 RX ADMIN — FOLIC ACID 1 MG: 1 TABLET ORAL at 09:59

## 2024-08-08 RX ADMIN — INSULIN LISPRO 12 UNITS: 100 INJECTION, SOLUTION INTRAVENOUS; SUBCUTANEOUS at 17:01

## 2024-08-08 RX ADMIN — Medication 100 MCG/HR: at 04:19

## 2024-08-08 RX ADMIN — SODIUM CHLORIDE, PRESERVATIVE FREE 10 ML: 5 INJECTION INTRAVENOUS at 10:00

## 2024-08-08 RX ADMIN — POLYETHYLENE GLYCOL 3350 34 G: 17 POWDER, FOR SOLUTION ORAL at 22:46

## 2024-08-08 RX ADMIN — POLYETHYLENE GLYCOL 3350 34 G: 17 POWDER, FOR SOLUTION ORAL at 10:00

## 2024-08-08 RX ADMIN — PIPERACILLIN AND TAZOBACTAM 4500 MG: 4; .5 INJECTION, POWDER, FOR SOLUTION INTRAVENOUS; PARENTERAL at 01:23

## 2024-08-08 RX ADMIN — INSULIN LISPRO 12 UNITS: 100 INJECTION, SOLUTION INTRAVENOUS; SUBCUTANEOUS at 05:21

## 2024-08-08 RX ADMIN — INSULIN GLARGINE 5 UNITS: 100 INJECTION, SOLUTION SUBCUTANEOUS at 05:22

## 2024-08-08 RX ADMIN — SODIUM CHLORIDE, PRESERVATIVE FREE 10 ML: 5 INJECTION INTRAVENOUS at 22:53

## 2024-08-08 RX ADMIN — THIAMINE HYDROCHLORIDE 200 MG: 100 INJECTION, SOLUTION INTRAMUSCULAR; INTRAVENOUS at 22:46

## 2024-08-08 RX ADMIN — DEXMEDETOMIDINE HYDROCHLORIDE 0.4 MCG/KG/HR: 4 INJECTION, SOLUTION INTRAVENOUS at 11:25

## 2024-08-08 RX ADMIN — INSULIN LISPRO 16 UNITS: 100 INJECTION, SOLUTION INTRAVENOUS; SUBCUTANEOUS at 11:15

## 2024-08-08 RX ADMIN — IPRATROPIUM BROMIDE AND ALBUTEROL SULFATE 1 DOSE: .5; 3 SOLUTION RESPIRATORY (INHALATION) at 00:10

## 2024-08-08 RX ADMIN — HEPARIN SODIUM 12 UNITS/KG/HR: 10000 INJECTION, SOLUTION INTRAVENOUS at 04:26

## 2024-08-08 RX ADMIN — METHYLPREDNISOLONE SODIUM SUCCINATE 40 MG: 40 INJECTION, POWDER, LYOPHILIZED, FOR SOLUTION INTRAMUSCULAR; INTRAVENOUS at 09:53

## 2024-08-08 RX ADMIN — DEXMEDETOMIDINE HYDROCHLORIDE 0.4 MCG/KG/HR: 4 INJECTION, SOLUTION INTRAVENOUS at 04:15

## 2024-08-08 RX ADMIN — PIPERACILLIN AND TAZOBACTAM 4500 MG: 4; .5 INJECTION, POWDER, FOR SOLUTION INTRAVENOUS; PARENTERAL at 17:15

## 2024-08-08 RX ADMIN — THIAMINE HYDROCHLORIDE 200 MG: 100 INJECTION, SOLUTION INTRAMUSCULAR; INTRAVENOUS at 09:53

## 2024-08-08 RX ADMIN — IPRATROPIUM BROMIDE AND ALBUTEROL SULFATE 1 DOSE: .5; 3 SOLUTION RESPIRATORY (INHALATION) at 16:55

## 2024-08-08 RX ADMIN — INSULIN LISPRO 12 UNITS: 100 INJECTION, SOLUTION INTRAVENOUS; SUBCUTANEOUS at 23:48

## 2024-08-08 RX ADMIN — DEXMEDETOMIDINE HYDROCHLORIDE 0.4 MCG/KG/HR: 4 INJECTION, SOLUTION INTRAVENOUS at 17:05

## 2024-08-08 RX ADMIN — INSULIN GLARGINE 20 UNITS: 100 INJECTION, SOLUTION SUBCUTANEOUS at 09:30

## 2024-08-08 RX ADMIN — IPRATROPIUM BROMIDE AND ALBUTEROL SULFATE 1 DOSE: .5; 3 SOLUTION RESPIRATORY (INHALATION) at 11:49

## 2024-08-08 RX ADMIN — IPRATROPIUM BROMIDE AND ALBUTEROL SULFATE 1 DOSE: .5; 3 SOLUTION RESPIRATORY (INHALATION) at 19:52

## 2024-08-08 ASSESSMENT — PULMONARY FUNCTION TESTS
PIF_VALUE: 31
PIF_VALUE: 26
PIF_VALUE: 29
PIF_VALUE: 31
PIF_VALUE: 29
PIF_VALUE: 28

## 2024-08-08 NOTE — PROGRESS NOTES
No SBT at this time-     08/08/24 0746   Weaning Parameters   Spontaneous Breathing Trial Complete (S)  No (comment)  (High PEEP and FiO2)

## 2024-08-08 NOTE — PROGRESS NOTES
VENTILATOR CARE PLAN    Problem: Ventilator Management  Goal: *Adequate oxygenation/ ventilation/ and extubation      Patient:        Jasmin Pacheco     78 y.o.   female     8/8/2024  6:06 PM  Patient Active Problem List   Diagnosis    Acute respiratory failure (HCC)    Acute on chronic diastolic heart failure (HCC)    Hyperlipidemia    Diabetes mellitus type 2 in obese    Acute cystitis    Shortness of breath    COPD exacerbation (HCC)    Acute on chronic respiratory failure with hypoxia and hypercapnia (HCC)    Acute respiratory failure with hypoxia and hypercarbia (HCC)    Congestive heart failure (HCC)       Acute respiratory failure with hypoxia and hypercarbia (HCC) [J96.01, J96.02]    Reason patient intubated: Hypercapnic/ hypoxic respiratory failure.    Ventilator day: 4.    Ventilator settings: PRVC, RR 10, vT 400, PEEP 10, FiO2 45%, tI 0.9 sec.    ETT Size/Placement: 7.5, 23 cm at left upper lip.     Wean Screen Pass (Yes or No): No.  Wean Screen Reason for Failure: High PEEP, sedated.  Duration of Weaning Trial: -  Additional Comments: -        PLAN OF CARE: Maintain adequate ventilation and oxygenation, wean vent settings as tolerated, SBT as tolerated when safe and appropriate.       GOAL: Extubation.      OUTCOME: Progressing towards goal.    ABG:  Date:8/8/2024   Latest Reference Range & Units 08/08/24 03:51   Base Excess mmol/L 9.3   Set Rate bpm 12   DEVICE -   ADULT VENT   Site -   RIGHT RADIAL   Mode -   ASSIST CONTROL   POC pH 7.35 - 7.45   7.37   POC pCO2 35.0 - 45.0 MMHG 64.4 (H)   POC PO2 80 - 100 MMHG 96   POC HCO3 22 - 26 MMOL/L 37.2 (H)   POC O2 SAT 92 - 97 % 96.9   POC Byron's Test -   Positive   POC TIDAL VOLUME ml 400   FIO2 % 50   POC PEEP cmH2O 10   (H): Data is abnormally high    Chest X-ray:  Date:8/8/2024  Report not signed by radiology at this time, image reviewed by RT. Improving aeration throughout with decreased opacities bilaterally. ETT in satisfactory position.     Lab  Test:  Date:8/8/2024  WBC:   Lab Results   Component Value Date/Time    WBC 17.4 08/08/2024 02:45 AM   HGB:   Lab Results   Component Value Date/Time    HGB 11.5 08/08/2024 02:45 AM    PLTS:   Lab Results   Component Value Date/Time     08/08/2024 02:45 AM         Vital Signs:   Patient Vitals for the past 8 hrs:   Temp Pulse Resp BP SpO2   08/08/24 1700 97.9 °F (36.6 °C) 63 21 (!) 142/83 97 %   08/08/24 1659 -- 59 14 -- 97 %   08/08/24 1600 97.7 °F (36.5 °C) 58 16 (!) 147/87 96 %   08/08/24 1500 97.7 °F (36.5 °C) 61 16 (!) 148/72 92 %   08/08/24 1150 -- 74 16 -- 93 %

## 2024-08-08 NOTE — CARE COORDINATION
SW noted patient HH and SNF referrals place in Care. Dispo to be determined.    Nata Granados BSW   Case Management

## 2024-08-09 ENCOUNTER — APPOINTMENT (OUTPATIENT)
Facility: HOSPITAL | Age: 79
DRG: 870 | End: 2024-08-09
Attending: INTERNAL MEDICINE
Payer: MEDICARE

## 2024-08-09 LAB
ANION GAP SERPL CALC-SCNC: 6 MMOL/L (ref 3–18)
APTT PPP: 84.4 SEC (ref 23–36.4)
ARTERIAL PATENCY WRIST A: POSITIVE
BASE EXCESS BLD CALC-SCNC: 6.4 MMOL/L
BASOPHILS # BLD: 0 K/UL (ref 0–0.1)
BASOPHILS NFR BLD: 0 % (ref 0–2)
BDY SITE: ABNORMAL
BUN SERPL-MCNC: 48 MG/DL (ref 7–18)
BUN/CREAT SERPL: 31 (ref 12–20)
CALCIUM SERPL-MCNC: 8.7 MG/DL (ref 8.5–10.1)
CHLORIDE SERPL-SCNC: 104 MMOL/L (ref 100–111)
CO2 SERPL-SCNC: 31 MMOL/L (ref 21–32)
CREAT SERPL-MCNC: 1.56 MG/DL (ref 0.6–1.3)
DIFFERENTIAL METHOD BLD: ABNORMAL
EOSINOPHIL # BLD: 0 K/UL (ref 0–0.4)
EOSINOPHIL NFR BLD: 0 % (ref 0–5)
ERYTHROCYTE [DISTWIDTH] IN BLOOD BY AUTOMATED COUNT: 15.5 % (ref 11.6–14.5)
GAS FLOW.O2 O2 DELIVERY SYS: ABNORMAL
GAS FLOW.O2 SETTING OXYMISER: 14 BPM
GLUCOSE BLD STRIP.AUTO-MCNC: 220 MG/DL (ref 70–110)
GLUCOSE BLD STRIP.AUTO-MCNC: 315 MG/DL (ref 70–110)
GLUCOSE BLD STRIP.AUTO-MCNC: 354 MG/DL (ref 70–110)
GLUCOSE SERPL-MCNC: 331 MG/DL (ref 74–99)
HCO3 BLD-SCNC: 33.1 MMOL/L (ref 22–26)
HCT VFR BLD AUTO: 34.7 % (ref 35–45)
HGB BLD-MCNC: 10.9 G/DL (ref 12–16)
IMM GRANULOCYTES # BLD AUTO: 0.3 K/UL (ref 0–0.04)
IMM GRANULOCYTES NFR BLD AUTO: 2 % (ref 0–0.5)
LYMPHOCYTES # BLD: 0.9 K/UL (ref 0.9–3.6)
LYMPHOCYTES NFR BLD: 6 % (ref 21–52)
MAGNESIUM SERPL-MCNC: 2.5 MG/DL (ref 1.6–2.6)
MCH RBC QN AUTO: 28.2 PG (ref 24–34)
MCHC RBC AUTO-ENTMCNC: 31.4 G/DL (ref 31–37)
MCV RBC AUTO: 89.7 FL (ref 78–100)
MONOCYTES # BLD: 0.3 K/UL (ref 0.05–1.2)
MONOCYTES NFR BLD: 2 % (ref 3–10)
NEUTS SEG # BLD: 14.1 K/UL (ref 1.8–8)
NEUTS SEG NFR BLD: 91 % (ref 40–73)
NRBC # BLD: 0.02 K/UL (ref 0–0.01)
NRBC BLD-RTO: 0.1 PER 100 WBC
O2/TOTAL GAS SETTING VFR VENT: 45 %
PCO2 BLD: 55.4 MMHG (ref 35–45)
PEEP RESPIRATORY: 10 CMH2O
PH BLD: 7.38 (ref 7.35–7.45)
PHOSPHATE SERPL-MCNC: 2.2 MG/DL (ref 2.5–4.9)
PLATELET # BLD AUTO: 204 K/UL (ref 135–420)
PMV BLD AUTO: 10.9 FL (ref 9.2–11.8)
PO2 BLD: 115 MMHG (ref 80–100)
POTASSIUM SERPL-SCNC: 4 MMOL/L (ref 3.5–5.5)
PROCALCITONIN SERPL-MCNC: 1.21 NG/ML
RBC # BLD AUTO: 3.87 M/UL (ref 4.2–5.3)
SAO2 % BLD: 98.3 % (ref 92–97)
SERVICE CMNT-IMP: ABNORMAL
SODIUM SERPL-SCNC: 141 MMOL/L (ref 136–145)
SPECIMEN TYPE: ABNORMAL
VENTILATION MODE VENT: ABNORMAL
VT SETTING VENT: 400 ML
WBC # BLD AUTO: 15.6 K/UL (ref 4.6–13.2)

## 2024-08-09 PROCEDURE — 2580000003 HC RX 258

## 2024-08-09 PROCEDURE — 6360000002 HC RX W HCPCS: Performed by: PHYSICIAN ASSISTANT

## 2024-08-09 PROCEDURE — 84100 ASSAY OF PHOSPHORUS: CPT

## 2024-08-09 PROCEDURE — 85730 THROMBOPLASTIN TIME PARTIAL: CPT

## 2024-08-09 PROCEDURE — 94640 AIRWAY INHALATION TREATMENT: CPT

## 2024-08-09 PROCEDURE — 82962 GLUCOSE BLOOD TEST: CPT

## 2024-08-09 PROCEDURE — 2000000000 HC ICU R&B

## 2024-08-09 PROCEDURE — 2580000003 HC RX 258: Performed by: PHYSICIAN ASSISTANT

## 2024-08-09 PROCEDURE — 36600 WITHDRAWAL OF ARTERIAL BLOOD: CPT

## 2024-08-09 PROCEDURE — 6370000000 HC RX 637 (ALT 250 FOR IP): Performed by: INTERNAL MEDICINE

## 2024-08-09 PROCEDURE — 6360000002 HC RX W HCPCS

## 2024-08-09 PROCEDURE — 94761 N-INVAS EAR/PLS OXIMETRY MLT: CPT

## 2024-08-09 PROCEDURE — 71045 X-RAY EXAM CHEST 1 VIEW: CPT

## 2024-08-09 PROCEDURE — 94003 VENT MGMT INPAT SUBQ DAY: CPT

## 2024-08-09 PROCEDURE — 2580000003 HC RX 258: Performed by: INTERNAL MEDICINE

## 2024-08-09 PROCEDURE — 6370000000 HC RX 637 (ALT 250 FOR IP): Performed by: NURSE PRACTITIONER

## 2024-08-09 PROCEDURE — 6370000000 HC RX 637 (ALT 250 FOR IP): Performed by: PHYSICIAN ASSISTANT

## 2024-08-09 PROCEDURE — 36415 COLL VENOUS BLD VENIPUNCTURE: CPT

## 2024-08-09 PROCEDURE — 82947 ASSAY GLUCOSE BLOOD QUANT: CPT

## 2024-08-09 PROCEDURE — 2500000003 HC RX 250 WO HCPCS

## 2024-08-09 PROCEDURE — 6360000002 HC RX W HCPCS: Performed by: NURSE PRACTITIONER

## 2024-08-09 PROCEDURE — 2500000003 HC RX 250 WO HCPCS: Performed by: PHYSICIAN ASSISTANT

## 2024-08-09 PROCEDURE — 82803 BLOOD GASES ANY COMBINATION: CPT

## 2024-08-09 PROCEDURE — 99291 CRITICAL CARE FIRST HOUR: CPT | Performed by: HOSPITALIST

## 2024-08-09 PROCEDURE — 80048 BASIC METABOLIC PNL TOTAL CA: CPT

## 2024-08-09 PROCEDURE — 85025 COMPLETE CBC W/AUTO DIFF WBC: CPT

## 2024-08-09 PROCEDURE — 2700000000 HC OXYGEN THERAPY PER DAY

## 2024-08-09 PROCEDURE — 83735 ASSAY OF MAGNESIUM: CPT

## 2024-08-09 PROCEDURE — 84145 PROCALCITONIN (PCT): CPT

## 2024-08-09 PROCEDURE — 2500000003 HC RX 250 WO HCPCS: Performed by: INTERNAL MEDICINE

## 2024-08-09 RX ORDER — DIPHENHYDRAMINE HYDROCHLORIDE 50 MG/ML
25 INJECTION INTRAMUSCULAR; INTRAVENOUS ONCE
Status: COMPLETED | OUTPATIENT
Start: 2024-08-09 | End: 2024-08-09

## 2024-08-09 RX ORDER — IPRATROPIUM BROMIDE AND ALBUTEROL SULFATE 2.5; .5 MG/3ML; MG/3ML
1 SOLUTION RESPIRATORY (INHALATION) EVERY 4 HOURS PRN
Status: DISCONTINUED | OUTPATIENT
Start: 2024-08-09 | End: 2024-08-24 | Stop reason: HOSPADM

## 2024-08-09 RX ORDER — FENTANYL CITRATE-0.9 % NACL/PF 10 MCG/ML
25-200 PLASTIC BAG, INJECTION (ML) INTRAVENOUS CONTINUOUS
Status: DISCONTINUED | OUTPATIENT
Start: 2024-08-09 | End: 2024-08-10

## 2024-08-09 RX ADMIN — IPRATROPIUM BROMIDE AND ALBUTEROL SULFATE 1 DOSE: .5; 3 SOLUTION RESPIRATORY (INHALATION) at 07:34

## 2024-08-09 RX ADMIN — INSULIN LISPRO 4 UNITS: 100 INJECTION, SOLUTION INTRAVENOUS; SUBCUTANEOUS at 17:46

## 2024-08-09 RX ADMIN — INSULIN LISPRO 12 UNITS: 100 INJECTION, SOLUTION INTRAVENOUS; SUBCUTANEOUS at 12:35

## 2024-08-09 RX ADMIN — SODIUM PHOSPHATE, MONOBASIC, MONOHYDRATE AND SODIUM PHOSPHATE, DIBASIC, ANHYDROUS 15 MMOL: 142; 276 INJECTION, SOLUTION INTRAVENOUS at 00:08

## 2024-08-09 RX ADMIN — Medication 75 MCG/HR: at 16:21

## 2024-08-09 RX ADMIN — IPRATROPIUM BROMIDE AND ALBUTEROL SULFATE 1 DOSE: .5; 3 SOLUTION RESPIRATORY (INHALATION) at 04:08

## 2024-08-09 RX ADMIN — IPRATROPIUM BROMIDE AND ALBUTEROL SULFATE 1 DOSE: .5; 3 SOLUTION RESPIRATORY (INHALATION) at 00:12

## 2024-08-09 RX ADMIN — METHYLPREDNISOLONE SODIUM SUCCINATE 40 MG: 40 INJECTION, POWDER, LYOPHILIZED, FOR SOLUTION INTRAMUSCULAR; INTRAVENOUS at 00:06

## 2024-08-09 RX ADMIN — PIPERACILLIN AND TAZOBACTAM 4500 MG: 4; .5 INJECTION, POWDER, FOR SOLUTION INTRAVENOUS; PARENTERAL at 17:45

## 2024-08-09 RX ADMIN — FOLIC ACID 1 MG: 1 TABLET ORAL at 08:54

## 2024-08-09 RX ADMIN — 0.12% CHLORHEXIDINE GLUCONATE 15 ML: 1.2 RINSE ORAL at 08:54

## 2024-08-09 RX ADMIN — PIPERACILLIN AND TAZOBACTAM 4500 MG: 4; .5 INJECTION, POWDER, FOR SOLUTION INTRAVENOUS; PARENTERAL at 01:32

## 2024-08-09 RX ADMIN — WATER 40 MG: 1 INJECTION INTRAMUSCULAR; INTRAVENOUS; SUBCUTANEOUS at 20:26

## 2024-08-09 RX ADMIN — FAMOTIDINE 20 MG: 10 INJECTION, SOLUTION INTRAVENOUS at 08:54

## 2024-08-09 RX ADMIN — DIPHENHYDRAMINE HYDROCHLORIDE 25 MG: 50 INJECTION INTRAMUSCULAR; INTRAVENOUS at 12:35

## 2024-08-09 RX ADMIN — 0.12% CHLORHEXIDINE GLUCONATE 15 ML: 1.2 RINSE ORAL at 01:31

## 2024-08-09 RX ADMIN — PIPERACILLIN AND TAZOBACTAM 4500 MG: 4; .5 INJECTION, POWDER, FOR SOLUTION INTRAVENOUS; PARENTERAL at 09:18

## 2024-08-09 RX ADMIN — Medication 75 MCG/HR: at 04:35

## 2024-08-09 RX ADMIN — INSULIN GLARGINE 30 UNITS: 100 INJECTION, SOLUTION SUBCUTANEOUS at 09:13

## 2024-08-09 RX ADMIN — 0.12% CHLORHEXIDINE GLUCONATE 15 ML: 1.2 RINSE ORAL at 20:23

## 2024-08-09 RX ADMIN — THIAMINE HYDROCHLORIDE 200 MG: 100 INJECTION, SOLUTION INTRAMUSCULAR; INTRAVENOUS at 20:25

## 2024-08-09 RX ADMIN — THIAMINE HYDROCHLORIDE 200 MG: 100 INJECTION, SOLUTION INTRAMUSCULAR; INTRAVENOUS at 08:56

## 2024-08-09 RX ADMIN — Medication 75 MCG/HR: at 09:30

## 2024-08-09 RX ADMIN — THIAMINE HYDROCHLORIDE 200 MG: 100 INJECTION, SOLUTION INTRAMUSCULAR; INTRAVENOUS at 15:26

## 2024-08-09 RX ADMIN — INSULIN LISPRO 16 UNITS: 100 INJECTION, SOLUTION INTRAVENOUS; SUBCUTANEOUS at 07:53

## 2024-08-09 RX ADMIN — METHYLPREDNISOLONE SODIUM SUCCINATE 40 MG: 40 INJECTION, POWDER, LYOPHILIZED, FOR SOLUTION INTRAMUSCULAR; INTRAVENOUS at 08:55

## 2024-08-09 RX ADMIN — SODIUM CHLORIDE, PRESERVATIVE FREE 10 ML: 5 INJECTION INTRAVENOUS at 08:56

## 2024-08-09 RX ADMIN — HEPARIN SODIUM 12 UNITS/KG/HR: 10000 INJECTION, SOLUTION INTRAVENOUS at 16:19

## 2024-08-09 RX ADMIN — SODIUM CHLORIDE, PRESERVATIVE FREE 10 ML: 5 INJECTION INTRAVENOUS at 20:25

## 2024-08-09 RX ADMIN — POLYETHYLENE GLYCOL 3350 34 G: 17 POWDER, FOR SOLUTION ORAL at 20:24

## 2024-08-09 ASSESSMENT — PULMONARY FUNCTION TESTS
PIF_VALUE: 28
PIF_VALUE: 24
PIF_VALUE: 28
PIF_VALUE: 26
PIF_VALUE: 16

## 2024-08-09 ASSESSMENT — PAIN SCALES - WONG BAKER
WONGBAKER_NUMERICALRESPONSE: NO HURT
WONGBAKER_NUMERICALRESPONSE: NO HURT

## 2024-08-09 ASSESSMENT — PAIN SCALES - GENERAL
PAINLEVEL_OUTOF10: 0

## 2024-08-09 NOTE — PLAN OF CARE
Problem: Safety - Adult  Goal: Free from fall injury  Outcome: Progressing     Problem: Chronic Conditions and Co-morbidities  Goal: Patient's chronic conditions and co-morbidity symptoms are monitored and maintained or improved  Outcome: Progressing  Flowsheets (Taken 8/9/2024 1200)  Care Plan - Patient's Chronic Conditions and Co-Morbidity Symptoms are Monitored and Maintained or Improved:   Monitor and assess patient's chronic conditions and comorbid symptoms for stability, deterioration, or improvement   Collaborate with multidisciplinary team to address chronic and comorbid conditions and prevent exacerbation or deterioration     Problem: Discharge Planning  Goal: Discharge to home or other facility with appropriate resources  Outcome: Progressing     Problem: Pain  Goal: Verbalizes/displays adequate comfort level or baseline comfort level  Outcome: Progressing  Flowsheets (Taken 8/9/2024 0800)  Verbalizes/displays adequate comfort level or baseline comfort level:   Encourage patient to monitor pain and request assistance   Assess pain using appropriate pain scale   Administer analgesics based on type and severity of pain and evaluate response   Implement non-pharmacological measures as appropriate and evaluate response   Consider cultural and social influences on pain and pain management     Problem: ABCDS Injury Assessment  Goal: Absence of physical injury  Outcome: Progressing     Problem: Nutrition Deficit:  Goal: Optimize nutritional status  Outcome: Progressing     Problem: Skin/Tissue Integrity  Goal: Absence of new skin breakdown  Description: 1.  Monitor for areas of redness and/or skin breakdown  2.  Assess vascular access sites hourly  3.  Every 4-6 hours minimum:  Change oxygen saturation probe site  4.  Every 4-6 hours:  If on nasal continuous positive airway pressure, respiratory therapy assess nares and determine need for appliance change or resting period.  Outcome: Progressing

## 2024-08-09 NOTE — PROGRESS NOTES
[facility].  Pulmonary, Critical Care, and Sleep Medicine    Name: Jasmin Pacheco MRN: [id number]   : 1945 Hospital: [facility]   Date: 2024  Admission Date: 2024     Chart and notes reviewed. Data reviewed. I have evaluated all findings.    [x]I have reviewed the flowsheet and previous day’s notes.    [x]The patient is unable to give any meaningful history or review of systems because the patient is:  [x]Intubated [x]Sedated   []Unresponsive      [x]The patient is critically ill on      [x]Mechanical ventilation []Pressors   []BiPAP []     IMPRESSION:   Acute hypoxic and hypercapnic respiratory failure requiring intubation due to acute heart failure and possibly mild ARDS with a P to F ratio of 250s as patient has been diuresed with minimal improvement in oxygenation.  COVID pneumonia  Severe sepsis due to above, POA, possible bacterial infection superimposed on COVID-pneumonia  Metabolic encephalopathy - likely toxic/metabolic  Acute on chronic diastolic heart failure (ECHO 24), grade 2 diastolic dysfunction with an ejection fraction of 55 to 60%, mod pHTN  Contraction alkalosis due to diuresis  LEA on CKD 3B  Morbid obesity, Body mass index is 48.92 kg/m².  T2DM   Hx PE - on eliSaiguo outpatient w/ VQ scan \"high probability\" of PE 24     Patient Active Problem List   Diagnosis    Acute respiratory failure (HCC)    Acute on chronic diastolic heart failure (HCC)    Hyperlipidemia    Diabetes mellitus type 2 in obese    Acute cystitis    Shortness of breath    COPD exacerbation (HCC)    Acute on chronic respiratory failure with hypoxia and hypercapnia (HCC)    Acute respiratory failure with hypoxia and hypercarbia (HCC)    Congestive heart failure (HCC)        RECOMMENDATIONS:   Neuro: Titrate sedation to RASS of 0 to -1. PRN for breakthrough sedation needs.  On Precedex/fentanyl drips.  Pulm: Titrate FiO2 for goal SPO2> 90%,VAP prevention bundle, head of the  Specialists

## 2024-08-09 NOTE — PROGRESS NOTES
VENTILATOR CARE PLAN    Problem: Ventilator Management  Goal: *Adequate oxygenation/ ventilation/ and extubation      Patient:        Jasmin Pacheco     78 y.o.   female     8/9/2024  9:45 AM  Patient Active Problem List   Diagnosis    Acute respiratory failure (HCC)    Acute on chronic diastolic heart failure (HCC)    Hyperlipidemia    Diabetes mellitus type 2 in obese    Acute cystitis    Shortness of breath    COPD exacerbation (HCC)    Acute on chronic respiratory failure with hypoxia and hypercapnia (HCC)    Acute respiratory failure with hypoxia and hypercarbia (HCC)    Congestive heart failure (HCC)       Acute respiratory failure with hypoxia and hypercarbia (HCC) [J96.01, J96.02]    Reason patient intubated: Hypercapnic/ hypoxic respiratory failure.     Ventilator day: 5.    Ventilator settings:  PRVC, RR 14, vT 400, PEEP 8, FiO2 40%, tI 1.0 sec.     ETT Size/Placement: 7.5, 23 cm at right upper lip.      Wean Screen Pass (Yes or No): No.  Wean Screen Reason for Failure: -  Duration of Weaning Trial: High PEEP requirement; weaning as tolerated.   Additional Comments: Pt awake and alert, follows commands.         PLAN OF CARE: Maintain adequate ventilation and oxygenation, wean vent settings as tolerated, SBT as tolerated when safe and appropriate.        GOAL: Extubation.       OUTCOME:  Progressing towards goal.     ABG:  Date:8/9/2024   Latest Reference Range & Units 08/09/24 04:17   Base Excess mmol/L 6.4   Set Rate bpm 14   DEVICE -   ADULT VENT   Site -   RIGHT RADIAL   Mode -   ASSIST CONTROL   POC pH 7.35 - 7.45   7.38   POC pCO2 35.0 - 45.0 MMHG 55.4 (H)   POC PO2 80 - 100 MMHG 115 (H)   POC HCO3 22 - 26 MMOL/L 33.1 (H)   POC O2 SAT 92 - 97 % 98.3 (H)   POC Byron's Test -   Positive   POC TIDAL VOLUME ml 400   FIO2 % 45   POC PEEP cmH2O 10   (H): Data is abnormally high    Chest X-ray:  Date:8/9/2024  Report not signed by radiology at this time, image reviewed by RT. Increased patchy opacities

## 2024-08-09 NOTE — PROGRESS NOTES
No SBT at this time-     08/09/24 0736   Weaning Parameters   Spontaneous Breathing Trial Complete (S)  No (comment)  (High PEEP requirement; weaning as tolerated.)

## 2024-08-10 ENCOUNTER — APPOINTMENT (OUTPATIENT)
Facility: HOSPITAL | Age: 79
DRG: 870 | End: 2024-08-10
Attending: INTERNAL MEDICINE
Payer: MEDICARE

## 2024-08-10 LAB
ANION GAP BLD CALC-SCNC: ABNORMAL MMOL/L (ref 10–20)
ANION GAP SERPL CALC-SCNC: 6 MMOL/L (ref 3–18)
APTT PPP: 83 SEC (ref 23–36.4)
ARTERIAL PATENCY WRIST A: POSITIVE
BASE EXCESS BLD CALC-SCNC: 6 MMOL/L
BASOPHILS # BLD: 0 K/UL (ref 0–0.1)
BASOPHILS NFR BLD: 0 % (ref 0–2)
BDY SITE: ABNORMAL
BUN SERPL-MCNC: 50 MG/DL (ref 7–18)
BUN/CREAT SERPL: 31 (ref 12–20)
CA-I BLD-MCNC: 1.22 MMOL/L (ref 1.12–1.32)
CALCIUM SERPL-MCNC: 8.5 MG/DL (ref 8.5–10.1)
CHLORIDE BLD-SCNC: 105 MMOL/L (ref 98–107)
CHLORIDE SERPL-SCNC: 106 MMOL/L (ref 100–111)
CO2 BLD-SCNC: 33 MMOL/L (ref 19–24)
CO2 SERPL-SCNC: 30 MMOL/L (ref 21–32)
CREAT BLD-MCNC: 1.47 MG/DL (ref 0.6–1.3)
CREAT SERPL-MCNC: 1.6 MG/DL (ref 0.6–1.3)
DIFFERENTIAL METHOD BLD: ABNORMAL
EOSINOPHIL # BLD: 0 K/UL (ref 0–0.4)
EOSINOPHIL NFR BLD: 0 % (ref 0–5)
ERYTHROCYTE [DISTWIDTH] IN BLOOD BY AUTOMATED COUNT: 15.9 % (ref 11.6–14.5)
FIO2 ON VENT: 40 %
GAS FLOW.O2 O2 DELIVERY SYS: ABNORMAL
GLUCOSE BLD STRIP.AUTO-MCNC: 228 MG/DL (ref 70–110)
GLUCOSE BLD STRIP.AUTO-MCNC: 280 MG/DL (ref 70–110)
GLUCOSE BLD STRIP.AUTO-MCNC: 303 MG/DL (ref 70–110)
GLUCOSE BLD STRIP.AUTO-MCNC: 335 MG/DL (ref 70–110)
GLUCOSE BLD-MCNC: 292 MG/DL (ref 70–110)
GLUCOSE SERPL-MCNC: 271 MG/DL (ref 74–99)
HCO3 BLD-SCNC: 32.3 MMOL/L (ref 22–26)
HCT VFR BLD AUTO: 35.6 % (ref 35–45)
HGB BLD-MCNC: 11 G/DL (ref 12–16)
IMM GRANULOCYTES # BLD AUTO: 0 K/UL (ref 0–0.04)
IMM GRANULOCYTES NFR BLD AUTO: 0 % (ref 0–0.5)
LACTATE BLD-SCNC: 1.44 MMOL/L (ref 0.4–2)
LYMPHOCYTES # BLD: 1.5 K/UL (ref 0.9–3.6)
LYMPHOCYTES NFR BLD: 8 % (ref 21–52)
MAGNESIUM SERPL-MCNC: 2.8 MG/DL (ref 1.6–2.6)
MCH RBC QN AUTO: 28.1 PG (ref 24–34)
MCHC RBC AUTO-ENTMCNC: 30.9 G/DL (ref 31–37)
MCV RBC AUTO: 90.8 FL (ref 78–100)
MONOCYTES # BLD: 0.4 K/UL (ref 0.05–1.2)
MONOCYTES NFR BLD: 2 % (ref 3–10)
NEUTS BAND NFR BLD MANUAL: 1 %
NEUTS SEG # BLD: 17.4 K/UL (ref 1.8–8)
NEUTS SEG NFR BLD: 89 % (ref 40–73)
NRBC # BLD: 0 K/UL (ref 0–0.01)
NRBC BLD-RTO: 0 PER 100 WBC
PCO2 BLD: 54 MMHG (ref 35–45)
PEEP RESPIRATORY: 5
PH BLD: 7.39 (ref 7.35–7.45)
PHOSPHATE SERPL-MCNC: 2.4 MG/DL (ref 2.5–4.9)
PLATELET # BLD AUTO: 207 K/UL (ref 135–420)
PLATELET COMMENT: ABNORMAL
PMV BLD AUTO: 11 FL (ref 9.2–11.8)
PO2 BLD: 85 MMHG (ref 80–100)
POTASSIUM BLD-SCNC: 4 MMOL/L (ref 3.5–5.1)
POTASSIUM SERPL-SCNC: 4.2 MMOL/L (ref 3.5–5.5)
RBC # BLD AUTO: 3.92 M/UL (ref 4.2–5.3)
RBC MORPH BLD: ABNORMAL
RESPIRATORY RATE, POC: 14 (ref 5–40)
SAO2 % BLD: 96 %
SERVICE CMNT-IMP: ABNORMAL
SODIUM BLD-SCNC: 144 MMOL/L (ref 136–145)
SODIUM SERPL-SCNC: 142 MMOL/L (ref 136–145)
SPECIMEN SITE: ABNORMAL
VENTILATION MODE VENT: ABNORMAL
VT SETTING VENT: 400
WBC # BLD AUTO: 19.3 K/UL (ref 4.6–13.2)

## 2024-08-10 PROCEDURE — 6360000002 HC RX W HCPCS: Performed by: PHYSICIAN ASSISTANT

## 2024-08-10 PROCEDURE — 80048 BASIC METABOLIC PNL TOTAL CA: CPT

## 2024-08-10 PROCEDURE — 82803 BLOOD GASES ANY COMBINATION: CPT

## 2024-08-10 PROCEDURE — 6370000000 HC RX 637 (ALT 250 FOR IP): Performed by: PHYSICIAN ASSISTANT

## 2024-08-10 PROCEDURE — 99291 CRITICAL CARE FIRST HOUR: CPT | Performed by: HOSPITALIST

## 2024-08-10 PROCEDURE — 36600 WITHDRAWAL OF ARTERIAL BLOOD: CPT

## 2024-08-10 PROCEDURE — 6360000002 HC RX W HCPCS

## 2024-08-10 PROCEDURE — 94003 VENT MGMT INPAT SUBQ DAY: CPT

## 2024-08-10 PROCEDURE — 84132 ASSAY OF SERUM POTASSIUM: CPT

## 2024-08-10 PROCEDURE — 2580000003 HC RX 258: Performed by: PHYSICIAN ASSISTANT

## 2024-08-10 PROCEDURE — 6370000000 HC RX 637 (ALT 250 FOR IP): Performed by: NURSE PRACTITIONER

## 2024-08-10 PROCEDURE — 36415 COLL VENOUS BLD VENIPUNCTURE: CPT

## 2024-08-10 PROCEDURE — 2500000003 HC RX 250 WO HCPCS

## 2024-08-10 PROCEDURE — 85025 COMPLETE CBC W/AUTO DIFF WBC: CPT

## 2024-08-10 PROCEDURE — 85014 HEMATOCRIT: CPT

## 2024-08-10 PROCEDURE — 84295 ASSAY OF SERUM SODIUM: CPT

## 2024-08-10 PROCEDURE — 2580000003 HC RX 258: Performed by: INTERNAL MEDICINE

## 2024-08-10 PROCEDURE — 2580000003 HC RX 258

## 2024-08-10 PROCEDURE — 85730 THROMBOPLASTIN TIME PARTIAL: CPT

## 2024-08-10 PROCEDURE — 2500000003 HC RX 250 WO HCPCS: Performed by: PHYSICIAN ASSISTANT

## 2024-08-10 PROCEDURE — 82330 ASSAY OF CALCIUM: CPT

## 2024-08-10 PROCEDURE — 94761 N-INVAS EAR/PLS OXIMETRY MLT: CPT

## 2024-08-10 PROCEDURE — 6360000002 HC RX W HCPCS: Performed by: NURSE PRACTITIONER

## 2024-08-10 PROCEDURE — 83605 ASSAY OF LACTIC ACID: CPT

## 2024-08-10 PROCEDURE — 84100 ASSAY OF PHOSPHORUS: CPT

## 2024-08-10 PROCEDURE — 2700000000 HC OXYGEN THERAPY PER DAY

## 2024-08-10 PROCEDURE — 71045 X-RAY EXAM CHEST 1 VIEW: CPT

## 2024-08-10 PROCEDURE — 83735 ASSAY OF MAGNESIUM: CPT

## 2024-08-10 PROCEDURE — 2000000000 HC ICU R&B

## 2024-08-10 RX ORDER — INSULIN GLARGINE 100 [IU]/ML
35 INJECTION, SOLUTION SUBCUTANEOUS DAILY
Status: DISCONTINUED | OUTPATIENT
Start: 2024-08-11 | End: 2024-08-17

## 2024-08-10 RX ORDER — INSULIN GLARGINE 100 [IU]/ML
5 INJECTION, SOLUTION SUBCUTANEOUS ONCE
Status: COMPLETED | OUTPATIENT
Start: 2024-08-10 | End: 2024-08-10

## 2024-08-10 RX ORDER — LORAZEPAM 2 MG/ML
2 INJECTION INTRAMUSCULAR EVERY 8 HOURS PRN
Status: DISCONTINUED | OUTPATIENT
Start: 2024-08-10 | End: 2024-08-24 | Stop reason: HOSPADM

## 2024-08-10 RX ORDER — DEXMEDETOMIDINE HYDROCHLORIDE 4 UG/ML
.1-1.5 INJECTION, SOLUTION INTRAVENOUS CONTINUOUS
Status: DISCONTINUED | OUTPATIENT
Start: 2024-08-10 | End: 2024-08-10

## 2024-08-10 RX ORDER — FUROSEMIDE 10 MG/ML
40 INJECTION INTRAMUSCULAR; INTRAVENOUS ONCE
Status: COMPLETED | OUTPATIENT
Start: 2024-08-10 | End: 2024-08-10

## 2024-08-10 RX ADMIN — POLYETHYLENE GLYCOL 3350 34 G: 17 POWDER, FOR SOLUTION ORAL at 09:59

## 2024-08-10 RX ADMIN — PIPERACILLIN AND TAZOBACTAM 4500 MG: 4; .5 INJECTION, POWDER, FOR SOLUTION INTRAVENOUS; PARENTERAL at 17:17

## 2024-08-10 RX ADMIN — INSULIN LISPRO 12 UNITS: 100 INJECTION, SOLUTION INTRAVENOUS; SUBCUTANEOUS at 17:20

## 2024-08-10 RX ADMIN — FAMOTIDINE 20 MG: 10 INJECTION, SOLUTION INTRAVENOUS at 09:58

## 2024-08-10 RX ADMIN — INSULIN GLARGINE 30 UNITS: 100 INJECTION, SOLUTION SUBCUTANEOUS at 09:58

## 2024-08-10 RX ADMIN — DEXMEDETOMIDINE HYDROCHLORIDE 0.2 MCG/KG/HR: 4 INJECTION, SOLUTION INTRAVENOUS at 15:24

## 2024-08-10 RX ADMIN — 0.12% CHLORHEXIDINE GLUCONATE 15 ML: 1.2 RINSE ORAL at 21:37

## 2024-08-10 RX ADMIN — INSULIN GLARGINE 5 UNITS: 100 INJECTION, SOLUTION SUBCUTANEOUS at 10:28

## 2024-08-10 RX ADMIN — THIAMINE HYDROCHLORIDE 200 MG: 100 INJECTION, SOLUTION INTRAMUSCULAR; INTRAVENOUS at 09:59

## 2024-08-10 RX ADMIN — WATER 40 MG: 1 INJECTION INTRAMUSCULAR; INTRAVENOUS; SUBCUTANEOUS at 09:58

## 2024-08-10 RX ADMIN — INSULIN LISPRO 8 UNITS: 100 INJECTION, SOLUTION INTRAVENOUS; SUBCUTANEOUS at 06:45

## 2024-08-10 RX ADMIN — SODIUM CHLORIDE, PRESERVATIVE FREE 10 ML: 5 INJECTION INTRAVENOUS at 09:59

## 2024-08-10 RX ADMIN — 0.12% CHLORHEXIDINE GLUCONATE 15 ML: 1.2 RINSE ORAL at 09:58

## 2024-08-10 RX ADMIN — Medication 75 MCG/HR: at 03:53

## 2024-08-10 RX ADMIN — FUROSEMIDE 40 MG: 10 INJECTION, SOLUTION INTRAMUSCULAR; INTRAVENOUS at 10:28

## 2024-08-10 RX ADMIN — INSULIN LISPRO 8 UNITS: 100 INJECTION, SOLUTION INTRAVENOUS; SUBCUTANEOUS at 12:31

## 2024-08-10 RX ADMIN — HEPARIN SODIUM 12 UNITS/KG/HR: 10000 INJECTION, SOLUTION INTRAVENOUS at 21:45

## 2024-08-10 RX ADMIN — HEPARIN SODIUM 12 UNITS/KG/HR: 10000 INJECTION, SOLUTION INTRAVENOUS at 06:57

## 2024-08-10 RX ADMIN — THIAMINE HYDROCHLORIDE 200 MG: 100 INJECTION, SOLUTION INTRAMUSCULAR; INTRAVENOUS at 15:23

## 2024-08-10 RX ADMIN — PIPERACILLIN AND TAZOBACTAM 4500 MG: 4; .5 INJECTION, POWDER, FOR SOLUTION INTRAVENOUS; PARENTERAL at 02:27

## 2024-08-10 RX ADMIN — PIPERACILLIN AND TAZOBACTAM 4500 MG: 4; .5 INJECTION, POWDER, FOR SOLUTION INTRAVENOUS; PARENTERAL at 10:16

## 2024-08-10 RX ADMIN — INSULIN LISPRO 4 UNITS: 100 INJECTION, SOLUTION INTRAVENOUS; SUBCUTANEOUS at 02:28

## 2024-08-10 RX ADMIN — FOLIC ACID 1 MG: 1 TABLET ORAL at 09:59

## 2024-08-10 ASSESSMENT — PULMONARY FUNCTION TESTS
PIF_VALUE: 24
PIF_VALUE: 18
PIF_VALUE: 25
PIF_VALUE: 32
PIF_VALUE: 24
PIF_VALUE: 26

## 2024-08-10 ASSESSMENT — PAIN SCALES - GENERAL: PAINLEVEL_OUTOF10: 0

## 2024-08-10 NOTE — PROGRESS NOTES
[facility].  Pulmonary, Critical Care, and Sleep Medicine    Name: Jasmin Pacheco MRN: [id number]   : 1945 Hospital: [facility]   Date: 8/10/2024  Admission Date: 2024     Chart and notes reviewed. Data reviewed. I have evaluated all findings.    [x]I have reviewed the flowsheet and previous day’s notes.    [x]The patient is unable to give any meaningful history or review of systems because the patient is:  [x]Intubated [x]Sedated   []Unresponsive      [x]The patient is critically ill on      [x]Mechanical ventilation []Pressors   []BiPAP []     IMPRESSION:   Acute hypoxic and hypercapnic respiratory failure requiring intubation due to acute heart failure and likely mild ARDS with a P to F ratio of 250s as patient has been diuresed with minimal improvement in oxygenation.  COVID pneumonia  Severe sepsis due to above, POA, possible bacterial infection superimposed on COVID-pneumonia  Metabolic encephalopathy - likely toxic/metabolic  Acute on chronic diastolic heart failure (ECHO 24), grade 2 diastolic dysfunction with an ejection fraction of 55 to 60%, mod pHTN  Contraction alkalosis due to diuresis  LEA on CKD 3B  Morbid obesity, Body mass index is 48.92 kg/m².  T2DM   Hx PE - on [employer] outpatient w/ VQ scan \"high probability\" of PE 24     Patient Active Problem List   Diagnosis    Acute respiratory failure (HCC)    Acute on chronic diastolic heart failure (HCC)    Hyperlipidemia    Diabetes mellitus type 2 in obese    Acute cystitis    Shortness of breath    COPD exacerbation (HCC)    Acute on chronic respiratory failure with hypoxia and hypercapnia (HCC)    Acute respiratory failure with hypoxia and hypercarbia (HCC)    Congestive heart failure (HCC)        RECOMMENDATIONS:   Neuro: Titrate sedation to RASS of 0 to -1. PRN for breakthrough sedation needs.  Weaned off sedation this AM  Pulm: Titrate FiO2 for goal SPO2> 90%,VAP prevention bundle, head of the

## 2024-08-10 NOTE — PROGRESS NOTES
VENTILATOR CARE PLAN    Problem: Ventilator Management  Goal: *Adequate oxygenation/ ventilation/ and extubation      Patient:        Jasmin Pacheco     78 y.o.   female     8/10/2024  10:06 AM  Patient Active Problem List   Diagnosis    Acute respiratory failure (HCC)    Acute on chronic diastolic heart failure (HCC)    Hyperlipidemia    Diabetes mellitus type 2 in obese    Acute cystitis    Shortness of breath    COPD exacerbation (HCC)    Acute on chronic respiratory failure with hypoxia and hypercapnia (HCC)    Acute respiratory failure with hypoxia and hypercarbia (HCC)    Congestive heart failure (HCC)       Acute respiratory failure with hypoxia and hypercarbia (HCC) [J96.01, J96.02]    Reason patient intubated: Hypercapnic/ hypoxic respiratory failure.     Ventilator day: 6.    Ventilator settings: PRVC, RR 14, vT 400, PEEP 5, FiO2 40%, tI 1.0 sec.    ETT Size/Placement: 7.5, 23 cm at left upper lip.      Wean Screen Pass (Yes or No): Yes.  Wean Screen Reason for Failure: -  Duration of Weaning Trial: Ongoing.  Additional Comments: Plan is to extubate to BiPAP for 1 hour, then HFNC if SBT successful.        PLAN OF CARE:  Maintain adequate ventilation and oxygenation, wean vent settings as tolerated, SBT as tolerated when safe and appropriate.        GOAL: Extubation.       OUTCOME:  Progressing towards goal.     ABG:  Date:8/10/2024   Latest Reference Range & Units 08/10/24 03:17   Base Excess mmol/L 6.0   Byron Test -   Positive   FIO2 Arterial % 40   DEVICE -   ADULT VENT   Site -   RIGHT RADIAL   Mode -   Pressure regulated volume control   Respiratory Rate -   14   POC pH 7.35 - 7.45   7.39   POC pCO2 35.0 - 45.0 MMHG 54.0 (H)   POC PO2 80 - 100 MMHG 85   POC HCO3 22 - 26 MMOL/L 32.3 (H)   POC O2 SAT % 96   POC TCO2 19 - 24 MMOL/L 33 (H)   POC PEEP/CPA -   5   POC TIDAL VOLUME -   400   POC Ionized Calcium 1.12 - 1.32 mmol/L 1.22   POC Potassium 3.5 - 5.1 mmol/L 4.0   POC Sodium 136 - 145 mmol/L 144   (H):

## 2024-08-10 NOTE — FLOWSHEET NOTE
Pt awake most of night.  HR average 60-70 B/P stable.  Follows simple commands. Incont of stool x 2-purewick changed out while cleaning pt who was incontinent of stool as well as leaking small amounts around the purewick.      0600-Pt has occasionally HR 55-60 range and when falls asleep will drop to 40-50 range.  Appears aymptomatic, updated ABDOUL Salazar.  B/P remains WNL.

## 2024-08-10 NOTE — PROGRESS NOTES
VENTILATOR CARE PLAN    Problem: Ventilator Management  Goal: *Adequate oxygenation/ ventilation/ and extubation      Patient:        Jasmin Pacheco     78 y.o.   female     8/10/2024  5:37 AM  Patient Active Problem List   Diagnosis    Acute respiratory failure (HCC)    Acute on chronic diastolic heart failure (HCC)    Hyperlipidemia    Diabetes mellitus type 2 in obese    Acute cystitis    Shortness of breath    COPD exacerbation (HCC)    Acute on chronic respiratory failure with hypoxia and hypercapnia (HCC)    Acute respiratory failure with hypoxia and hypercarbia (HCC)    Congestive heart failure (HCC)       Acute respiratory failure with hypoxia and hypercarbia (HCC) [J96.01, J96.02]    Reason patient intubated: Hypercapnic/ hypoxic respiratory failure.      Ventilator day: 6     Ventilator settings:  PRVC, RR 14, vT 400, PEEP 8, FiO2 40%, tI 1.0 sec.      ETT Size/Placement: 7.5, 23 cm at right upper lip.      Wean Screen Pass (Yes or No): No.  Wean Screen Reason for Failure: -  Duration of Weaning Trial: High PEEP requirement; weaning as tolerated.   Additional Comments: Pt awake and alert, follows commands.          PLAN OF CARE: Maintain adequate ventilation and oxygenation, wean vent settings as tolerated, SBT as tolerated when safe and appropriate.         GOAL: Extubation.         OUTCOME:  Progressing towards goal.     AB.385  54  85  32.3  6  96%  Date:8/10/2024  @[id number](ph,phi,pco2,pco2i,po2,po2i,hco3,hco3i,fio2,fio2i)@    Chest X-ray:  Date:8/10/2024  @BSHSILASTIMGCAT(RUL1853:1)@    Lab Test:  Date:8/10/2024  WBC:   Lab Results   Component Value Date/Time    WBC 19.3 08/10/2024 03:59 AM   HGB:   Lab Results   Component Value Date/Time    HGB 11.0 08/10/2024 03:59 AM    PLTS:   Lab Results   Component Value Date/Time     08/10/2024 03:59 AM         Vital Signs:   Patient Vitals for the past 8 hrs:   Temp Pulse Resp BP SpO2   08/10/24 0313 -- 64 15 -- 100 %   08/10/24 0301 -- 54 14 -- 100

## 2024-08-11 ENCOUNTER — APPOINTMENT (OUTPATIENT)
Facility: HOSPITAL | Age: 79
DRG: 870 | End: 2024-08-11
Attending: INTERNAL MEDICINE
Payer: MEDICARE

## 2024-08-11 LAB
ANION GAP SERPL CALC-SCNC: 5 MMOL/L (ref 3–18)
APTT PPP: 113.9 SEC (ref 23–36.4)
APTT PPP: 72.1 SEC (ref 23–36.4)
ARTERIAL PATENCY WRIST A: POSITIVE
BACTERIA SPEC CULT: NORMAL
BACTERIA SPEC CULT: NORMAL
BASE EXCESS BLD CALC-SCNC: 9 MMOL/L
BASOPHILS # BLD: 0 K/UL (ref 0–0.1)
BASOPHILS NFR BLD: 0 % (ref 0–2)
BDY SITE: ABNORMAL
BUN SERPL-MCNC: 62 MG/DL (ref 7–18)
BUN/CREAT SERPL: 37 (ref 12–20)
CALCIUM SERPL-MCNC: 9 MG/DL (ref 8.5–10.1)
CHLORIDE SERPL-SCNC: 104 MMOL/L (ref 100–111)
CO2 SERPL-SCNC: 33 MMOL/L (ref 21–32)
CREAT SERPL-MCNC: 1.67 MG/DL (ref 0.6–1.3)
DIFFERENTIAL METHOD BLD: ABNORMAL
EOSINOPHIL # BLD: 0.1 K/UL (ref 0–0.4)
EOSINOPHIL NFR BLD: 1 % (ref 0–5)
ERYTHROCYTE [DISTWIDTH] IN BLOOD BY AUTOMATED COUNT: 16 % (ref 11.6–14.5)
GAS FLOW.O2 O2 DELIVERY SYS: ABNORMAL
GAS FLOW.O2 SETTING OXYMISER: 14 BPM
GLUCOSE BLD STRIP.AUTO-MCNC: 123 MG/DL (ref 70–110)
GLUCOSE BLD STRIP.AUTO-MCNC: 135 MG/DL (ref 70–110)
GLUCOSE BLD STRIP.AUTO-MCNC: 144 MG/DL (ref 70–110)
GLUCOSE BLD STRIP.AUTO-MCNC: 145 MG/DL (ref 70–110)
GLUCOSE SERPL-MCNC: 153 MG/DL (ref 74–99)
HCO3 BLD-SCNC: 36.3 MMOL/L (ref 22–26)
HCT VFR BLD AUTO: 39.1 % (ref 35–45)
HGB BLD-MCNC: 12.1 G/DL (ref 12–16)
IMM GRANULOCYTES # BLD AUTO: 0.3 K/UL (ref 0–0.04)
IMM GRANULOCYTES NFR BLD AUTO: 1 % (ref 0–0.5)
LYMPHOCYTES # BLD: 1.9 K/UL (ref 0.9–3.6)
LYMPHOCYTES NFR BLD: 9 % (ref 21–52)
MAGNESIUM SERPL-MCNC: 3 MG/DL (ref 1.6–2.6)
MCH RBC QN AUTO: 28.3 PG (ref 24–34)
MCHC RBC AUTO-ENTMCNC: 30.9 G/DL (ref 31–37)
MCV RBC AUTO: 91.6 FL (ref 78–100)
MONOCYTES # BLD: 0.9 K/UL (ref 0.05–1.2)
MONOCYTES NFR BLD: 5 % (ref 3–10)
NEUTS SEG # BLD: 16.4 K/UL (ref 1.8–8)
NEUTS SEG NFR BLD: 84 % (ref 40–73)
NRBC # BLD: 0.03 K/UL (ref 0–0.01)
NRBC BLD-RTO: 0.2 PER 100 WBC
O2/TOTAL GAS SETTING VFR VENT: 40 %
PCO2 BLD: 60.9 MMHG (ref 35–45)
PEEP RESPIRATORY: 5 CMH2O
PH BLD: 7.38 (ref 7.35–7.45)
PHOSPHATE SERPL-MCNC: 2.3 MG/DL (ref 2.5–4.9)
PLATELET # BLD AUTO: 208 K/UL (ref 135–420)
PMV BLD AUTO: 11 FL (ref 9.2–11.8)
PO2 BLD: 56 MMHG (ref 80–100)
POTASSIUM SERPL-SCNC: 3.9 MMOL/L (ref 3.5–5.5)
RBC # BLD AUTO: 4.27 M/UL (ref 4.2–5.3)
SAO2 % BLD: 87.2 % (ref 92–97)
SERVICE CMNT-IMP: ABNORMAL
SERVICE CMNT-IMP: NORMAL
SERVICE CMNT-IMP: NORMAL
SODIUM SERPL-SCNC: 142 MMOL/L (ref 136–145)
SPECIMEN TYPE: ABNORMAL
VENTILATION MODE VENT: ABNORMAL
VT SETTING VENT: 400 ML
WBC # BLD AUTO: 19.6 K/UL (ref 4.6–13.2)

## 2024-08-11 PROCEDURE — 2700000000 HC OXYGEN THERAPY PER DAY

## 2024-08-11 PROCEDURE — 6360000002 HC RX W HCPCS: Performed by: PHYSICIAN ASSISTANT

## 2024-08-11 PROCEDURE — 2580000003 HC RX 258: Performed by: PHYSICIAN ASSISTANT

## 2024-08-11 PROCEDURE — 85025 COMPLETE CBC W/AUTO DIFF WBC: CPT

## 2024-08-11 PROCEDURE — 5A0935A ASSISTANCE WITH RESPIRATORY VENTILATION, LESS THAN 24 CONSECUTIVE HOURS, HIGH NASAL FLOW/VELOCITY: ICD-10-PCS | Performed by: HOSPITALIST

## 2024-08-11 PROCEDURE — 80048 BASIC METABOLIC PNL TOTAL CA: CPT

## 2024-08-11 PROCEDURE — 6360000002 HC RX W HCPCS: Performed by: NURSE PRACTITIONER

## 2024-08-11 PROCEDURE — 94660 CPAP INITIATION&MGMT: CPT

## 2024-08-11 PROCEDURE — 84100 ASSAY OF PHOSPHORUS: CPT

## 2024-08-11 PROCEDURE — 99291 CRITICAL CARE FIRST HOUR: CPT | Performed by: HOSPITALIST

## 2024-08-11 PROCEDURE — 2000000000 HC ICU R&B

## 2024-08-11 PROCEDURE — 36415 COLL VENOUS BLD VENIPUNCTURE: CPT

## 2024-08-11 PROCEDURE — 6370000000 HC RX 637 (ALT 250 FOR IP): Performed by: NURSE PRACTITIONER

## 2024-08-11 PROCEDURE — 94761 N-INVAS EAR/PLS OXIMETRY MLT: CPT

## 2024-08-11 PROCEDURE — 83735 ASSAY OF MAGNESIUM: CPT

## 2024-08-11 PROCEDURE — 82962 GLUCOSE BLOOD TEST: CPT

## 2024-08-11 PROCEDURE — 82803 BLOOD GASES ANY COMBINATION: CPT

## 2024-08-11 PROCEDURE — 85730 THROMBOPLASTIN TIME PARTIAL: CPT

## 2024-08-11 PROCEDURE — 2500000003 HC RX 250 WO HCPCS: Performed by: PHYSICIAN ASSISTANT

## 2024-08-11 PROCEDURE — 2580000003 HC RX 258: Performed by: INTERNAL MEDICINE

## 2024-08-11 PROCEDURE — 71045 X-RAY EXAM CHEST 1 VIEW: CPT

## 2024-08-11 PROCEDURE — 6370000000 HC RX 637 (ALT 250 FOR IP): Performed by: PHYSICIAN ASSISTANT

## 2024-08-11 PROCEDURE — 36600 WITHDRAWAL OF ARTERIAL BLOOD: CPT

## 2024-08-11 PROCEDURE — 94003 VENT MGMT INPAT SUBQ DAY: CPT

## 2024-08-11 RX ADMIN — WATER 40 MG: 1 INJECTION INTRAMUSCULAR; INTRAVENOUS; SUBCUTANEOUS at 09:40

## 2024-08-11 RX ADMIN — PIPERACILLIN AND TAZOBACTAM 4500 MG: 4; .5 INJECTION, POWDER, FOR SOLUTION INTRAVENOUS; PARENTERAL at 10:11

## 2024-08-11 RX ADMIN — FAMOTIDINE 20 MG: 10 INJECTION, SOLUTION INTRAVENOUS at 09:40

## 2024-08-11 RX ADMIN — INSULIN GLARGINE 35 UNITS: 100 INJECTION, SOLUTION SUBCUTANEOUS at 09:41

## 2024-08-11 RX ADMIN — THIAMINE HYDROCHLORIDE 200 MG: 100 INJECTION, SOLUTION INTRAMUSCULAR; INTRAVENOUS at 20:44

## 2024-08-11 RX ADMIN — FOLIC ACID 1 MG: 1 TABLET ORAL at 09:40

## 2024-08-11 RX ADMIN — SODIUM CHLORIDE, PRESERVATIVE FREE 10 ML: 5 INJECTION INTRAVENOUS at 00:16

## 2024-08-11 RX ADMIN — POLYETHYLENE GLYCOL 3350 34 G: 17 POWDER, FOR SOLUTION ORAL at 00:01

## 2024-08-11 RX ADMIN — HEPARIN SODIUM 5000 UNITS: 1000 INJECTION INTRAVENOUS; SUBCUTANEOUS at 07:40

## 2024-08-11 RX ADMIN — LORAZEPAM 2 MG: 2 INJECTION INTRAMUSCULAR; INTRAVENOUS at 01:52

## 2024-08-11 RX ADMIN — SODIUM CHLORIDE, PRESERVATIVE FREE 10 ML: 5 INJECTION INTRAVENOUS at 20:44

## 2024-08-11 RX ADMIN — HEPARIN SODIUM 14 UNITS/KG/HR: 10000 INJECTION, SOLUTION INTRAVENOUS at 12:04

## 2024-08-11 RX ADMIN — PIPERACILLIN AND TAZOBACTAM 4500 MG: 4; .5 INJECTION, POWDER, FOR SOLUTION INTRAVENOUS; PARENTERAL at 18:19

## 2024-08-11 RX ADMIN — SODIUM CHLORIDE, PRESERVATIVE FREE 10 ML: 5 INJECTION INTRAVENOUS at 09:42

## 2024-08-11 RX ADMIN — THIAMINE HYDROCHLORIDE 200 MG: 100 INJECTION, SOLUTION INTRAMUSCULAR; INTRAVENOUS at 15:36

## 2024-08-11 RX ADMIN — 0.12% CHLORHEXIDINE GLUCONATE 15 ML: 1.2 RINSE ORAL at 09:40

## 2024-08-11 RX ADMIN — PIPERACILLIN AND TAZOBACTAM 4500 MG: 4; .5 INJECTION, POWDER, FOR SOLUTION INTRAVENOUS; PARENTERAL at 02:38

## 2024-08-11 RX ADMIN — THIAMINE HYDROCHLORIDE 200 MG: 100 INJECTION, SOLUTION INTRAMUSCULAR; INTRAVENOUS at 00:17

## 2024-08-11 RX ADMIN — THIAMINE HYDROCHLORIDE 200 MG: 100 INJECTION, SOLUTION INTRAMUSCULAR; INTRAVENOUS at 09:40

## 2024-08-11 ASSESSMENT — PAIN SCALES - WONG BAKER: WONGBAKER_NUMERICALRESPONSE: NO HURT

## 2024-08-11 ASSESSMENT — PULMONARY FUNCTION TESTS
PIF_VALUE: 34
PIF_VALUE: 24
PIF_VALUE: 23

## 2024-08-11 ASSESSMENT — PAIN SCALES - GENERAL
PAINLEVEL_OUTOF10: 0

## 2024-08-11 NOTE — PROGRESS NOTES
VENTILATOR CARE PLAN    Problem: Ventilator Management  Goal: *Adequate oxygenation/ ventilation/ and extubation      Patient:        Jasmin Pacheco     78 y.o.   female     8/11/2024  9:30 AM  Patient Active Problem List   Diagnosis    Acute respiratory failure (HCC)    Acute on chronic diastolic heart failure (HCC)    Hyperlipidemia    Diabetes mellitus type 2 in obese    Acute cystitis    Shortness of breath    COPD exacerbation (HCC)    Acute on chronic respiratory failure with hypoxia and hypercapnia (HCC)    Acute respiratory failure with hypoxia and hypercarbia (HCC)    Congestive heart failure (HCC)       Acute respiratory failure with hypoxia and hypercarbia (HCC) [J96.01, J96.02]    Reason patient intubated: Hypercapnic/ hypoxic respiratory failure.     Ventilator day: 7.    Ventilator settings: Currently on SBT, PS 8, PEEP 5, FiO2 40%. Rest settings PRVC, RR 14, vT 400, PEEP 5, FiO2 40%, tI 1.0 sec.     ETT Size/Placement: 7.5 JARRED, 23 cm at right upper lip.     Wean Screen Pass (Yes or No): Yes.  Wean Screen Reason for Failure: -  Duration of Weaning Trial: Ongoing.  Additional Comments: Pt tolerating PS better this morning. Plan is to extubate to BiPAP for 1 hour, then HFNC if SBT successful.         PLAN OF CARE: Maintain adequate ventilation and oxygenation, wean vent settings as tolerated, SBT as tolerated when safe and appropriate        GOAL: Extubation.       OUTCOME: Progressing towards goal.     ABG:  Date:8/11/2024   Latest Reference Range & Units 08/11/24 05:10   Base Excess mmol/L 9.0   Set Rate bpm 14   DEVICE -   ADULT VENT   Site -   RIGHT RADIAL   Mode -   BILEVEL   POC pH 7.35 - 7.45   7.38   POC pCO2 35.0 - 45.0 MMHG 60.9 (H)   POC PO2 80 - 100 MMHG 56 (L)   POC HCO3 22 - 26 MMOL/L 36.3 (H)   POC O2 SAT 92 - 97 % 87.2 (L)   POC Byron's Test -   Positive   POC TIDAL VOLUME ml 400   FIO2 % 40   POC PEEP cmH2O 5   (H): Data is abnormally high  (L): Data is abnormally low    Chest  X-ray:  Date:8/11/2024  XR CHEST PORTABLE  Order: [id number]  Status: Final result       Visible to patient: Yes (not seen)       Next appt: None    0 Result Notes  Details    Reading Physician Reading Date Result Priority   Devendra Obrien MD  [phone] 8/11/2024      Narrative & Impression  EXAM: XR CHEST PORTABLE     CLINICAL HISTORY/INDICATION: daily while intubated     COMPARISON: 8/10/2024     TECHNIQUE: 1 view(s)     FINDINGS:   Patient is rotated. Orotracheal tube is in good position above the waqar. An OG  tube is present with the distal tip below the field-of-view, seen to the level  of the distal esophagus. Cardiomegaly with pulmonary vascular congestion and  interstitial opacities are unchanged. Linear opacity left lung base probably  superimposed atelectasis. Increased density right lung base could be due to  overlying soft tissue shadows from rotation but pleural effusion or superimposed  atelectasis not excluded. No pneumothorax.        IMPRESSION:  Cardiomegaly, pulmonary vascular congestion and interstitial edema pattern is  overall similar. There is increasing atelectasis left lung base. Suspect small  right pleural effusion and adjacent atelectasis.              Electronically signed by Devendra Obrien           Exam Ended: 08/11/24 01:36 EDT Last Resulted: 08/11/24 08:35 EDT          Lab Test:  Date:8/11/2024  WBC:   Lab Results   Component Value Date/Time    WBC 19.6 08/11/2024 05:24 AM   HGB:   Lab Results   Component Value Date/Time    HGB 12.1 08/11/2024 05:24 AM    PLTS:   Lab Results   Component Value Date/Time     08/11/2024 05:24 AM         Vital Signs:   Patient Vitals for the past 8 hrs:   Pulse Resp BP SpO2   08/11/24 0725 54 14 -- 100 %   08/11/24 0700 56 14 -- 100 %   08/11/24 0630 59 14 137/64 99 %   08/11/24 0600 65 14 118/65 100 %   08/11/24 0530 79 15 (!) 146/81 99 %   08/11/24 0500 71 18 (!) 118/103 --   08/11/24 0430 57 16 -- --   08/11/24 0400 51 14 112/61 --   08/11/24

## 2024-08-11 NOTE — PROGRESS NOTES
Pt extubated to BiPAP following successful SBT and positive cuff- leak test. Suctioned airway pre and post procedure. No complications, no signs of stridor or distress.     08/11/24 1114   NIV Type   $NIV $Daily Charge   NIV Started/Stopped On   Equipment Type V60   Mode Bilevel   Mask Type Full face mask   Mask Size Medium   Assessment   Pulse 82   Respirations 18   SpO2 100 %   Level of Consciousness 0   Comfort Level Fair   Using Accessory Muscles No   Mask Compliance Good   Skin Assessment Clean, dry, & intact   Skin Protection for O2 Device Yes   Orientation Anterior;Bilateral   Location Cheek;Forehead;Nose   Breath Sounds   Respiratory Pattern Regular   Upper Airway Sounds Other (comment)  (Clear)   Breath Sounds Bilateral Coarse crackles   Settings/Measurements   PIP Observed 17 cm H20   IPAP 16 cmH20   CPAP/EPAP 8 cmH2O   Vt (Measured) 362 mL   Rate Ordered 12   FiO2  40 %   Minute Volume (L/min) 10.1 Liters   Mask Leak (lpm) 48 lpm   Patient's Home Machine No   Alarm Settings   Alarms On Y   Low Pressure (cmH2O) 6 cmH2O   High Pressure (cmH2O) 40 cmH2O   Apnea (secs) 20 secs   RR Low (bpm) 8   RR High (bpm) 40 br/min   Oxygen Therapy   O2 Device (S)  PAP (positive airway pressure)  (V60)       Tolerating. Will monitor for signs of distress. Plan is to convert to HFNC in one hour if tolerated well.

## 2024-08-11 NOTE — PROGRESS NOTES
SBT initiated, PS 8, PEEP 5, FIO2 40%. Pt awake and alert, follows commands.     08/11/24 0725   Weaning Parameters   Spontaneous Breathing Trial Complete (S)  Yes   Respiratory Rate Observed 18   Ve 4.52      RSBI 71       Tolerating. Will monitor for signs of apnea or distress.

## 2024-08-11 NOTE — PROGRESS NOTES
[facility].  Pulmonary, Critical Care, and Sleep Medicine    Name: Jasmin Pacheco MRN: [id number]   : 1945 Hospital: [facility]   Date: 2024  Admission Date: 2024     Chart and notes reviewed. Data reviewed. I have evaluated all findings.    [x]I have reviewed the flowsheet and previous day’s notes.    [x]The patient is unable to give any meaningful history or review of systems because the patient is:  [x]Intubated []Sedated   []Unresponsive      [x]The patient is critically ill on      [x]Mechanical ventilation []Pressors   []BiPAP []     IMPRESSION:   Acute hypoxic and hypercapnic respiratory failure requiring intubation due to acute heart failure and likely mild ARDS with a P to F ratio of 250s as patient has been diuresed with minimal improvement in oxygenation.  COVID pneumonia  Severe sepsis due to above, POA, possible bacterial infection superimposed on COVID-pneumonia  Metabolic encephalopathy - likely toxic/metabolic  Acute on chronic diastolic heart failure (ECHO 24), grade 2 diastolic dysfunction with an ejection fraction of 55 to 60%, mod pHTN  Contraction alkalosis due to diuresis  LEA on CKD 3B  Morbid obesity, Body mass index is 48.92 kg/m².  T2DM   Hx PE - on [employer] outpatient w/ VQ scan \"high probability\" of PE 24     Patient Active Problem List   Diagnosis    Acute respiratory failure (HCC)    Acute on chronic diastolic heart failure (HCC)    Hyperlipidemia    Diabetes mellitus type 2 in obese    Acute cystitis    Shortness of breath    COPD exacerbation (HCC)    Acute on chronic respiratory failure with hypoxia and hypercapnia (HCC)    Acute respiratory failure with hypoxia and hypercarbia (HCC)    Congestive heart failure (HCC)        RECOMMENDATIONS:   Neuro: Titrate sedation to RASS of 0 to -1. PRN for breakthrough sedation needs.  Weaned off sedation this AM  Pulm: Titrate FiO2 for goal SPO2> 90%,VAP prevention bundle, head of the  bed at 30' all times.Daily sedation holiday and assessment for weaning with SBT as tolerated.    Did well on SBT 8/5 this AM.  Plan extubate to BiPAP.  Monitor on 1 hour following extubation and transition to HFNC if doing well  Solu-medrol to 40 mg daily  CVS: Echo reviewed, intermittent diuresis.    GI: SUP, Trend LFTs, Zofran PRN for N/V, Diet NPO.  Swallow eval following extubation  Renal:  Trend Renal indices, Strict Is/Os, Hoffman removed hypernatremia improved; FWF decreased to 50 every 4 hours..   Hem/Onc: Monitor for s/o active bleeding.   I/D: Sepsis bundle per hospital protocol, Blood, Sputum, and Urine cultures drawn and will be followed.   Lactate normalized.  Trend WBCs and temperature curve.  Antibiotics: .  Procal intermediate at 1.21.  Can continue zosyn for now for 5 day planned duration.  Off vanc as MRSA nares neg  COVID (+) 8/05/24  Endocrine: Q6 glucoses, SSI. Check TSH level  Metabolic:  Daily BMP, mag, phos. Trend lytes, replace as needed. BG q3hrs for hypoglycemia. Increasing Lantus dose.  This plus lowering steroid should help with hyperglycemia  Musc/Skin: no acute issues, wound care  Palliative: Full Code   MPOA: Familia Pacheco (child), updated bedside 8/5  Palliative: Full Code  Discussed in interdisciplinary rounds     Best practice:    Glycemic control  IHI ICU bundles:   Vent Bundle Followed, Vent Day 3  [facility] Vent patients-    VAP bundle-Yanelis tube to suction at 20-30 cm Hg, Maintain [place] tube with 5-10ml air every 4 hours, Routine oral care every 4 hours, Elevation of head > 45 degree, Daily sedation holiday and SBT evaluation starting at 6.00am.  Stress ulcer prophylaxis.   Pepcid  DVT prophylaxis.   Heparin gtt  Need for Lines, hoffman assessed.  Palliative care evaluation.  Restraints need.  Attending Non-violent Restraint Reevaluation     I have reevaluated the patient one hour after initiation of intervention. The patient is comfortable, uninjured, but continues to pose an imminent

## 2024-08-11 NOTE — FLOWSHEET NOTE
Pt remains intubated, 40% fio2.  VSS, heartrate 50-60 range  at rest.  Will continue to monitor, sedation remains off . Pt becomes anxious and fidgeting at times. Attempted to distract pt-repositoned, mouth care completed.         Remains awake and restless, rec'd ativan 2mg IV.  Is incont of stool and some urine from around pure wick.  All linen changed, pt bathed .

## 2024-08-11 NOTE — PROGRESS NOTES
Weaned from BiPAP to Vapotherm HVNI/ HFNC, 30 LPM, 40% FiO2.     08/11/24 1254   Oxygen Therapy/Pulse Ox   O2 Therapy Oxygen humidified   O2 Device (S)  Heated high flow cannula  (Vapotherm)   O2 Flow Rate (L/min) 30 L/min   FiO2  40 %   Pulse 71   Respirations 17   SpO2 98 %   Skin Assessment Clean, dry, & intact   Skin Protection for O2 Device Yes   Orientation Anterior;Bilateral   Location Ear;Nose;Lip   Intervention(s) Reposition Device   Humidification Source Heated wire   Humidification Temp Measured 36   Circuit Condensation Drained       Tolerating well. Will monitor for signs of hypoxia or distress.

## 2024-08-12 ENCOUNTER — APPOINTMENT (OUTPATIENT)
Facility: HOSPITAL | Age: 79
DRG: 870 | End: 2024-08-12
Attending: INTERNAL MEDICINE
Payer: MEDICARE

## 2024-08-12 LAB
ANION GAP SERPL CALC-SCNC: 1 MMOL/L (ref 3–18)
ANION GAP SERPL CALC-SCNC: 1 MMOL/L (ref 3–18)
APTT PPP: 109.6 SEC (ref 23–36.4)
APTT PPP: 138.9 SEC (ref 23–36.4)
APTT PPP: 80.2 SEC (ref 23–36.4)
BASOPHILS # BLD: 0 K/UL (ref 0–0.1)
BASOPHILS NFR BLD: 0 % (ref 0–2)
BUN SERPL-MCNC: 49 MG/DL (ref 7–18)
BUN SERPL-MCNC: 49 MG/DL (ref 7–18)
BUN/CREAT SERPL: 33 (ref 12–20)
BUN/CREAT SERPL: 34 (ref 12–20)
CALCIUM SERPL-MCNC: 8.5 MG/DL (ref 8.5–10.1)
CALCIUM SERPL-MCNC: 8.7 MG/DL (ref 8.5–10.1)
CHLORIDE SERPL-SCNC: 107 MMOL/L (ref 100–111)
CHLORIDE SERPL-SCNC: 107 MMOL/L (ref 100–111)
CO2 SERPL-SCNC: 35 MMOL/L (ref 21–32)
CO2 SERPL-SCNC: 36 MMOL/L (ref 21–32)
CREAT SERPL-MCNC: 1.46 MG/DL (ref 0.6–1.3)
CREAT SERPL-MCNC: 1.5 MG/DL (ref 0.6–1.3)
DIFFERENTIAL METHOD BLD: ABNORMAL
EOSINOPHIL # BLD: 0.3 K/UL (ref 0–0.4)
EOSINOPHIL NFR BLD: 2 % (ref 0–5)
ERYTHROCYTE [DISTWIDTH] IN BLOOD BY AUTOMATED COUNT: 15.8 % (ref 11.6–14.5)
GLUCOSE BLD STRIP.AUTO-MCNC: 134 MG/DL (ref 70–110)
GLUCOSE BLD STRIP.AUTO-MCNC: 85 MG/DL (ref 70–110)
GLUCOSE SERPL-MCNC: 76 MG/DL (ref 74–99)
GLUCOSE SERPL-MCNC: 80 MG/DL (ref 74–99)
HCT VFR BLD AUTO: 41.2 % (ref 35–45)
HGB BLD-MCNC: 12.5 G/DL (ref 12–16)
IMM GRANULOCYTES # BLD AUTO: 0.2 K/UL (ref 0–0.04)
IMM GRANULOCYTES NFR BLD AUTO: 1 % (ref 0–0.5)
LYMPHOCYTES # BLD: 2.2 K/UL (ref 0.9–3.6)
LYMPHOCYTES NFR BLD: 12 % (ref 21–52)
MAGNESIUM SERPL-MCNC: 2.6 MG/DL (ref 1.6–2.6)
MAGNESIUM SERPL-MCNC: 2.9 MG/DL (ref 1.6–2.6)
MCH RBC QN AUTO: 28.3 PG (ref 24–34)
MCHC RBC AUTO-ENTMCNC: 30.3 G/DL (ref 31–37)
MCV RBC AUTO: 93.2 FL (ref 78–100)
MONOCYTES # BLD: 1.3 K/UL (ref 0.05–1.2)
MONOCYTES NFR BLD: 7 % (ref 3–10)
NEUTS SEG # BLD: 14.1 K/UL (ref 1.8–8)
NEUTS SEG NFR BLD: 78 % (ref 40–73)
NRBC # BLD: 0.03 K/UL (ref 0–0.01)
NRBC BLD-RTO: 0.2 PER 100 WBC
PHOSPHATE SERPL-MCNC: 3.3 MG/DL (ref 2.5–4.9)
PHOSPHATE SERPL-MCNC: 3.6 MG/DL (ref 2.5–4.9)
PLATELET # BLD AUTO: 192 K/UL (ref 135–420)
PMV BLD AUTO: 11.1 FL (ref 9.2–11.8)
POTASSIUM SERPL-SCNC: 4 MMOL/L (ref 3.5–5.5)
POTASSIUM SERPL-SCNC: 5.9 MMOL/L (ref 3.5–5.5)
RBC # BLD AUTO: 4.42 M/UL (ref 4.2–5.3)
SODIUM SERPL-SCNC: 143 MMOL/L (ref 136–145)
SODIUM SERPL-SCNC: 144 MMOL/L (ref 136–145)
WBC # BLD AUTO: 18.2 K/UL (ref 4.6–13.2)

## 2024-08-12 PROCEDURE — 97162 PT EVAL MOD COMPLEX 30 MIN: CPT

## 2024-08-12 PROCEDURE — 85730 THROMBOPLASTIN TIME PARTIAL: CPT

## 2024-08-12 PROCEDURE — 97535 SELF CARE MNGMENT TRAINING: CPT

## 2024-08-12 PROCEDURE — 82962 GLUCOSE BLOOD TEST: CPT

## 2024-08-12 PROCEDURE — 2580000003 HC RX 258: Performed by: INTERNAL MEDICINE

## 2024-08-12 PROCEDURE — 6360000002 HC RX W HCPCS: Performed by: PHYSICIAN ASSISTANT

## 2024-08-12 PROCEDURE — 94761 N-INVAS EAR/PLS OXIMETRY MLT: CPT

## 2024-08-12 PROCEDURE — 2580000003 HC RX 258: Performed by: PHYSICIAN ASSISTANT

## 2024-08-12 PROCEDURE — 6370000000 HC RX 637 (ALT 250 FOR IP): Performed by: PHYSICIAN ASSISTANT

## 2024-08-12 PROCEDURE — 92526 ORAL FUNCTION THERAPY: CPT

## 2024-08-12 PROCEDURE — 6370000000 HC RX 637 (ALT 250 FOR IP): Performed by: NURSE PRACTITIONER

## 2024-08-12 PROCEDURE — 97110 THERAPEUTIC EXERCISES: CPT

## 2024-08-12 PROCEDURE — 6360000002 HC RX W HCPCS: Performed by: NURSE PRACTITIONER

## 2024-08-12 PROCEDURE — 92610 EVALUATE SWALLOWING FUNCTION: CPT

## 2024-08-12 PROCEDURE — 94660 CPAP INITIATION&MGMT: CPT

## 2024-08-12 PROCEDURE — 2000000000 HC ICU R&B

## 2024-08-12 PROCEDURE — 71045 X-RAY EXAM CHEST 1 VIEW: CPT

## 2024-08-12 PROCEDURE — 83735 ASSAY OF MAGNESIUM: CPT

## 2024-08-12 PROCEDURE — 97166 OT EVAL MOD COMPLEX 45 MIN: CPT

## 2024-08-12 PROCEDURE — 84100 ASSAY OF PHOSPHORUS: CPT

## 2024-08-12 PROCEDURE — 36415 COLL VENOUS BLD VENIPUNCTURE: CPT

## 2024-08-12 PROCEDURE — APPSS30 APP SPLIT SHARED TIME 16-30 MINUTES

## 2024-08-12 PROCEDURE — 80048 BASIC METABOLIC PNL TOTAL CA: CPT

## 2024-08-12 PROCEDURE — 85025 COMPLETE CBC W/AUTO DIFF WBC: CPT

## 2024-08-12 PROCEDURE — 2700000000 HC OXYGEN THERAPY PER DAY

## 2024-08-12 PROCEDURE — 2500000003 HC RX 250 WO HCPCS: Performed by: PHYSICIAN ASSISTANT

## 2024-08-12 RX ORDER — PREDNISONE 20 MG/1
40 TABLET ORAL DAILY
Status: COMPLETED | OUTPATIENT
Start: 2024-08-13 | End: 2024-08-17

## 2024-08-12 RX ADMIN — POLYETHYLENE GLYCOL 3350 34 G: 17 POWDER, FOR SOLUTION ORAL at 09:27

## 2024-08-12 RX ADMIN — WATER 40 MG: 1 INJECTION INTRAMUSCULAR; INTRAVENOUS; SUBCUTANEOUS at 09:26

## 2024-08-12 RX ADMIN — THIAMINE HYDROCHLORIDE 200 MG: 100 INJECTION, SOLUTION INTRAMUSCULAR; INTRAVENOUS at 09:26

## 2024-08-12 RX ADMIN — THIAMINE HYDROCHLORIDE 200 MG: 100 INJECTION, SOLUTION INTRAMUSCULAR; INTRAVENOUS at 21:54

## 2024-08-12 RX ADMIN — SODIUM CHLORIDE, PRESERVATIVE FREE 10 ML: 5 INJECTION INTRAVENOUS at 19:51

## 2024-08-12 RX ADMIN — HEPARIN SODIUM 10 UNITS/KG/HR: 10000 INJECTION, SOLUTION INTRAVENOUS at 19:45

## 2024-08-12 RX ADMIN — PIPERACILLIN AND TAZOBACTAM 4500 MG: 4; .5 INJECTION, POWDER, FOR SOLUTION INTRAVENOUS; PARENTERAL at 02:56

## 2024-08-12 RX ADMIN — THIAMINE HYDROCHLORIDE 200 MG: 100 INJECTION, SOLUTION INTRAMUSCULAR; INTRAVENOUS at 15:30

## 2024-08-12 RX ADMIN — FAMOTIDINE 20 MG: 10 INJECTION, SOLUTION INTRAVENOUS at 09:27

## 2024-08-12 RX ADMIN — PIPERACILLIN AND TAZOBACTAM 4500 MG: 4; .5 INJECTION, POWDER, FOR SOLUTION INTRAVENOUS; PARENTERAL at 09:36

## 2024-08-12 RX ADMIN — FOLIC ACID 1 MG: 1 TABLET ORAL at 09:28

## 2024-08-12 RX ADMIN — PIPERACILLIN AND TAZOBACTAM 4500 MG: 4; .5 INJECTION, POWDER, FOR SOLUTION INTRAVENOUS; PARENTERAL at 17:24

## 2024-08-12 RX ADMIN — INSULIN GLARGINE 35 UNITS: 100 INJECTION, SOLUTION SUBCUTANEOUS at 09:27

## 2024-08-12 RX ADMIN — HEPARIN SODIUM 12 UNITS/KG/HR: 10000 INJECTION, SOLUTION INTRAVENOUS at 02:58

## 2024-08-12 RX ADMIN — SODIUM CHLORIDE, PRESERVATIVE FREE 10 ML: 5 INJECTION INTRAVENOUS at 09:28

## 2024-08-12 ASSESSMENT — PAIN SCALES - GENERAL
PAINLEVEL_OUTOF10: 0
PAINLEVEL_OUTOF10: 0

## 2024-08-12 ASSESSMENT — PAIN SCALES - WONG BAKER
WONGBAKER_NUMERICALRESPONSE: NO HURT
WONGBAKER_NUMERICALRESPONSE: NO HURT

## 2024-08-12 NOTE — PROGRESS NOTES
Speech Pathology    Bedside swallow eval completed with recs of easy to chew solids and thin liquids. D/w RN. Full report to follow.     Thank you for this referral.    Kusum Vitale M.S. CCC-SLP  Speech Language Pathologist

## 2024-08-12 NOTE — PLAN OF CARE
Problem: Physical Therapy - Adult  Goal: By Discharge: Performs mobility at highest level of function for planned discharge setting.  See evaluation for individualized goals.  Description: Physical Therapy Goals  Initiated 8/12/2024 and to be accomplished within 7 day(s)  1.  Patient will move from supine to sit and sit to supine  in bed with minimal assistance/contact guard assist.    2.  Patient will transfer from bed to chair and chair to bed with minimal assistance/contact guard assist using the least restrictive device.  3.  Patient will perform sit to stand with minimal assistance/contact guard assist.  4.  Patient will ambulate with minimal assistance/contact guard assist for 50 feet with the least restrictive device.   5.  Patient will maintain seated EOB 8 minutes with supervision.    PLOF: Lives with sons. One story home with 2 steps and ramp entry. Amb with rollator. Sleeps in recliner.   Outcome: Progressing   PHYSICAL THERAPY EVALUATION    Patient: Jasmin Pacheco (78 y.o. female)  Date: 8/12/2024  Primary Diagnosis: Acute respiratory failure with hypoxia and hypercarbia (HCC) [J96.01, J96.02]  Precautions: Contact Precautions, Isolation (droplet +)  ASSESSMENT :  Droplet plus isolation. On 30L hiflow fiO2 40%. O2 saturation fluctuates between mid 80s-mid 90s. Cues for breathing through nose; preference of mouth breather. Max A for long sit in bed. Fatigues easily with noted shortness of breath. Deferred EOB d/t SOB with bed level activity. Educated on need for RN assistance with mobility; verbalized understanding. Call bell in reach.     DEFICITS/IMPAIRMENTS:   Body Structures, Functions, Activity Limitations Requiring Skilled Therapeutic Intervention: Decreased functional mobility ;Decreased safe awareness;Decreased cognition;Decreased balance;Decreased strength;Decreased ROM;Decreased endurance    Patient will benefit from skilled intervention to address the above impairments.  Patient's rehabilitation

## 2024-08-12 NOTE — PROGRESS NOTES
[facility].  Pulmonary, Critical Care, and Sleep Medicine    Name: Jasmin Pacheco MRN: [id number]   : 1945 Hospital: [facility]   Date: 2024  Admission Date: 2024     Chart and notes reviewed. Data reviewed. I have evaluated all findings.    [x]I have reviewed the flowsheet and previous day’s notes.    [x]The patient is unable to give any meaningful history or review of systems because the patient is:  [x]Intubated []Sedated   []Unresponsive      [x]The patient is critically ill on      [x]Mechanical ventilation []Pressors   []BiPAP []     IMPRESSION:   Acute hypoxic and hypercapnic respiratory failure requiring intubation due to acute heart failure and likely mild ARDS with a P to F ratio of 250s as patient has been diuresed with minimal improvement in oxygenation.  COVID pneumonia  Severe sepsis due to above, POA, possible bacterial infection superimposed on COVID-pneumonia  Metabolic encephalopathy - likely toxic/metabolic  Acute on chronic diastolic heart failure (ECHO 24), grade 2 diastolic dysfunction with an ejection fraction of 55 to 60%, mod pHTN  Contraction alkalosis due to diuresis  LEA on CKD 3B  Morbid obesity, Body mass index is 48.92 kg/m².  T2DM   Hx PE - on [employer] outpatient w/ VQ scan \"high probability\" of PE 24     Patient Active Problem List   Diagnosis    Acute respiratory failure (HCC)    Acute on chronic diastolic heart failure (HCC)    Hyperlipidemia    Diabetes mellitus type 2 in obese    Acute cystitis    Shortness of breath    COPD exacerbation (HCC)    Acute on chronic respiratory failure with hypoxia and hypercapnia (HCC)    Acute respiratory failure with hypoxia and hypercarbia (HCC)    Congestive heart failure (HCC)        RECOMMENDATIONS:   Neuro: Keep off sedation. Routine neuro checks.   Pulm: Con't HFNC, titrate prn SpO2 >90%. BiPAP at night.   Prednisone 40mg daily x5 days.   CVS: Echo reviewed, intermittent diuresis.  that could result in the failure of one or more body systems and/or organ systems due to respiratory distress, hypoxia, cardiac dysrhythmia.       RYANN STOKES PA-C  08/12/24  Pulmonary, Critical Care Medicine  [facility]       Chart and note reviewed. Data reviewed. Seen on rounds today - [unfilled]. I have independently evaluated and examined the patient. I agree with the exam, assessment and plans outlined by NP/PA.     In brief, my findings, evaluation and recommendations are as stated below:    Doing well on HFNC following extubation  Tolerating diet  Finish zosyn today  Adjusted solu-medrol to prednisone 40 mg daily for 5 day course  Downgrade to stepdown  Pulmonary will follow on floor              Rest of details and diagnostic/treatment plans per NP/PA note.     I have personally reviewed all pertinent data including labs, imaging and recommendations of treatment team providers.  Aggregate critical care time was 35 minutes, of which >50% was provided by myself, which includes only time during which I was engaged in work directly related to the patient's care evaluation, management and care decisions, and ordering appropriate treatment related to critical care problems exclusive of procedures. This time was independent of other practitioners.     The reason for providing this level of medical care for this critically ill patient was due a critical illness that impaired one or more vital organ systems such that there was a high probability of imminent or life threatening deterioration in the patients condition. This care involved high complexity decision making to assess, manipulate, and support vital system functions, to treat this degree vital organ system failure and to prevent further life threatening deterioration of the patient’s condition.                            Bautista Tomlin MD  8/8/2024   Pulmonary, Critical Care Medicine  [facility]

## 2024-08-12 NOTE — PLAN OF CARE
Problem: Safety - Adult  Goal: Free from fall injury  Outcome: Progressing     Problem: Chronic Conditions and Co-morbidities  Goal: Patient's chronic conditions and co-morbidity symptoms are monitored and maintained or improved  Outcome: Progressing     Problem: Discharge Planning  Goal: Discharge to home or other facility with appropriate resources  Outcome: Progressing     Problem: Pain  Goal: Verbalizes/displays adequate comfort level or baseline comfort level  Outcome: Progressing     Problem: Safety - Medical Restraint  Goal: Remains free of injury from restraints (Restraint for Interference with Medical Device)  Description: INTERVENTIONS:  1. Determine that other, less restrictive measures have been tried or would not be effective before applying the restraint  2. Evaluate the patient's condition at the time of restraint application  3. Inform patient/family regarding the reason for restraint  4. Q2H: Monitor safety, psychosocial status, comfort, nutrition and hydration  Outcome: Progressing     Problem: ABCDS Injury Assessment  Goal: Absence of physical injury  Outcome: Progressing     Problem: Nutrition Deficit:  Goal: Optimize nutritional status  Outcome: Progressing  Flowsheets (Taken 8/12/2024 0952 by Dockweiler, Megan, RD)  Nutrient intake appropriate for improving, restoring, or maintaining nutritional needs:   Assess nutritional status and recommend course of action   Monitor oral intake, labs, and treatment plans   Recommend appropriate diets, oral nutritional supplements, and vitamin/mineral supplements     Problem: Skin/Tissue Integrity  Goal: Absence of new skin breakdown  Description: 1.  Monitor for areas of redness and/or skin breakdown  2.  Assess vascular access sites hourly  3.  Every 4-6 hours minimum:  Change oxygen saturation probe site  4.  Every 4-6 hours:  If on nasal continuous positive airway pressure, respiratory therapy assess nares and determine need for appliance change or

## 2024-08-12 NOTE — PROGRESS NOTES
INTERIM UPDATE - 1043 EST on 8/12/2024    Procedures performed in [facility] ER/ICU:  [x][]  Endotracheal Intubation  [][]  Reintubation  [][]  Tracheostomy  [][]  Chest Tube(s)  [][]  PEG Tube    Agents required:  [x][]  IV Pressor(s) [][]  IV Inotropic agent(s)  [][]  IV Diuretic gtt  [][]  IV Antihypertensive gtt  [][]  IV Rate-Controlling gtt  [][]  IV-Rhythm Controlling Infusion  [][]  IV Insulin gtt  [][x]  IV Heparin gtt  [][]  IV Dexmedetomidine (a.k.a. Precedex) gtt (while Extubated)     [][]  Blood Products    Interventions required:   [][]  Lo Hugger  [][]  Restraints (while Extubated)  [][]  Sitter  [][]  CRRT       Current FiO2 requirement:   [][]  Room Air  [][]  Nasal Cannula [][]  Salter [][x]  CPAP/BiPAP qHS [][x]  High Flow NC    Followed by:  [x] Pulmonology  [] Nephrology  [] Cardiology  [] Gastroenterology  [] Neurology   [x] Infectious Disease  [] Hematology/Oncology  [] Interventional Radiology    [] Cardiothoracic Surgery  [] Orthopedic [Spinal] Surgery  [] General Surgery  [] Vascular Surgery   [] Bariatric Surgery  [] ENT  [] Palliative      *First box indicates this procedure/intervention was performed in [facility] ICU  **Second box indicates that the procedure/intervention is still in place and/or on-going at time of Transition of service      Intensivist services state that Patient is being down-graded from their service (and, thus, Transferred to Hospitalist service) and will be physically Transferred to [facility] Stepdown Unit, as is possible.    Per [facility] Intensivist Clinician, Patient presented to [facility] on 7/30/2024 and was admitted to [facility] ICU on 7/31/2024 for Acute on Chronic Hypoxic Hypercapnic Respiratory Failure requiring BiPAP, Acute on Chronic Heart Failure with Preserved Ejection Fraction (a.k.a. HFpEF), Acute Kidney Injury (a.k.a. LEA) Stage 1 on CKD Stage III.  Patient was Transferred to [facility] ICU on 8/05/2024 for Continuous Need for BiPAP and lack of

## 2024-08-12 NOTE — PLAN OF CARE
Problem: Occupational Therapy - Adult  Goal: By Discharge: Performs self-care activities at highest level of function for planned discharge setting.  See evaluation for individualized goals.  Description: Occupational Therapy Goals:  Initiated 8/12/2024 to be met within 7-10 days.    1.  Patient will perform bed mobility in preparation for ADLs with maximal assistance x 1.   2.  Patient will perform grooming with supervision/set-up.  3.  Patient will perform self-feeding with modified independence following set-up.  4.  Patient will perform upper body dressing with minimal assistance/contact guard assist.  5.  Patient will perform functional activity sitting EOB for approx. 2 minutes with supervision/set-up, F+ balance in preparation for progression to standing goals.  6.  Patient will participate in upper extremity therapeutic exercise/activities with supervision/set-up for 8-10 minutes to increase strength/endurance for ADLs.    7.  Patient will utilize energy conservation techniques during functional activities with verbal cues.      PL0F: Pt reports she needed assist with self-care and used a rollator for functional mobility.  Outcome: Progressing       OCCUPATIONAL THERAPY EVALUATION    Patient: Jasmin Pacheco (78 y.o. female)  Date: 8/12/2024  Primary Diagnosis: Acute respiratory failure with hypoxia and hypercarbia (HCC) [J96.01, J96.02]  Precautions:Contact Precautions, Isolation (droplet +),  ,  ,  ,  ,  ,  ,      ASSESSMENT :  Pt cleared to participate in OT evaluation by RN with 30L HFNC donned. Pt supine in bed, alert, and agreeable to participate. Pt desat to mid 80s during session, educated on energy conservation techniques, pursed lip breathing, and self pacing during daily activities. Pt with limited bilateral shoulder flexion, min assist needed for washing face, max assist x 2 for supine <> longsit. Patient unable to maintain sitting balance, repositioned back comfortably. Pt with right lean preference  interdisciplinary team recommendations to determine the most appropriate discharge setting. Patient's social support, diagnosis, medical stability, and prior level of function should also be taken into consideration.     SUBJECTIVE:   Patient stated, “I'm just short.” referring to breathing    OBJECTIVE DATA SUMMARY:     Past Medical History:   Diagnosis Date    CHF (congestive heart failure) (HCC)     Diabetes (HCC)     Hyperlipemia     Sleep apnea      Past Surgical History:   Procedure Laterality Date    CATARACT REMOVAL Bilateral     TOTAL HIP ARTHROPLASTY Bilateral     TOTAL KNEE ARTHROPLASTY Bilateral        Home Situation:   Social/Functional History  Lives With: Son, Family  Type of Home: House  Home Layout: One level  Home Access: Ramped entrance  Bathroom Shower/Tub: Walk-in shower  Bathroom Toilet: Standard  Bathroom Equipment: Built-in shower seat, Grab bars in shower, Grab bars around toilet  Bathroom Accessibility: Accessible  Home Equipment: Walker - Rolling, Rollator, Wheelchair - Manual  Receives Help From: Family  ADL Assistance: Needs assistance  Bath: Maximum assistance  Dressing: Maximum assistance  Grooming: Maximum assistance  Feeding: Maximum assistance  Toileting: Needs assistance  Homemaking Assistance: Needs assistance  Meal Prep: Total  Laundry: Total  Vacuuming: Total  Cleaning: Total  Gardening: Total  Yard Work: Total  Driving: Total  Shopping: Total  Homemaking Responsibilities: No  Ambulation Assistance: Needs assistance  Transfer Assistance: Needs assistance  Active : No  Patient's  Info: Relies on Others for transportation, has Medicaid Transport.  Occupation: Retired  [x]  Right hand dominant   []  Left hand dominant    Cognitive/Behavioral Status:  Orientation  Orientation Level: Oriented to time;Oriented to person;Disoriented to place (reports place as [facility])  Cognition  Overall Cognitive Status: WFL    Skin: Intact  Edema: mild swelling noted in B

## 2024-08-12 NOTE — PLAN OF CARE
Problem: SLP Adult - Impaired Swallowing  Goal: By Discharge: Advance to least restrictive diet without signs or symptoms of aspiration for planned discharge setting.  See evaluation for individualized goals.  Description: Patient will:  1. Tolerate PO trials with 0 s/s overt distress in 4/5 trials  2. Utilize compensatory swallow strategies/maneuvers (decrease bite/sip, size/rate, alt. liq/sol) with min cues in 4/5 trials  3. Perform oral-motor/laryngeal exercises to increase oropharyngeal swallow function with min cues  4. Complete an objective swallow study (i.e., MBSS) to assess swallow integrity, r/o aspiration, and determine of safest LRD, min A as indicated/ordered by MD     Rec:     Easy to chew diet with thin liquids  Aspiration precautions  HOB >45 during po intake, remain >30 for 30-45 minutes after po   Small bites/sips; alternate liquid/solid with slow feeding rate   Oral care TID  Meds per pt preference    Outcome: Progressing  SPEECH LANGUAGE PATHOLOGY BEDSIDE SWALLOW EVALUATION/TREATMENT    Patient: Jasmin Pacheco (78 y.o. female)  Date: 8/12/2024  Primary Diagnosis: Acute respiratory failure with hypoxia and hypercarbia (HCC) [J96.01, J96.02]       Precautions: Aspiration  PLOF: As per H&P  ASSESSMENT:  Based on the objective data described below, the patient presents with suspected mild pharyngeal dysphagia s/p extubation on 8/11/24 intubated for ~7 days. Pt on 35L 40% FiO2 HFNC. Pt tolerated reg solids, puree, ice chips, and thin liquids via tsp and + straw consecutive swallows without overt s/s of aspiration. Labored mastication with reg solids and mild oral residue cleared with thin liquid wash. Slightly delayed swallow initiation and reduced laryngeal elevation to palpation with all PO. Recommend easy to chew solids and thin liquids, aspiration precautions, oral care TID, and meds in puree. Will continue to follow to ensure diet tolerance.    TREATMENT:  Skilled therapy initiated; Educated patient    Procedure Laterality Date    CATARACT REMOVAL Bilateral     TOTAL HIP ARTHROPLASTY Bilateral     TOTAL KNEE ARTHROPLASTY Bilateral      Prior Level of Function/Home Situation:  Social/Functional History  Lives With: Son, Family  Type of Home: House  Home Layout: One level  Home Access: Ramped entrance  Bathroom Shower/Tub: Walk-in shower  Bathroom Toilet: Standard  Bathroom Equipment: Built-in shower seat, Grab bars in shower, Grab bars around toilet  Bathroom Accessibility: Accessible  Home Equipment: Walker - Rolling, Rollator, Wheelchair - Manual  Receives Help From: Family  ADL Assistance: Needs assistance  Bath: Maximum assistance  Dressing: Maximum assistance  Grooming: Maximum assistance  Feeding: Maximum assistance  Toileting: Needs assistance  Homemaking Assistance: Needs assistance  Meal Prep: Total  Laundry: Total  Vacuuming: Total  Cleaning: Total  Gardening: Total  Yard Work: Total  Driving: Total  Shopping: Total  Homemaking Responsibilities: No  Ambulation Assistance: Needs assistance  Transfer Assistance: Needs assistance  Active : No  Patient's  Info: Relies on Others for transportation, has Medicaid Transport.  Occupation: Retired    Daily Assessment:  Baseline Assessment  Respiratory Status: O2 via nasual cannula  O2 Device: High flow nasal cannula  Liters of Oxygen:  (35L 40% FiO2)  Communication Observation: Functional  Follows Directions: Simple  Current Diet : NPO  Current Liquid Diet : NPO  Dentition: Adequate  Patient Positioning: Upright in bed  Baseline Vocal Quality: Hoarse  Volitional Cough: Strong    Orientation:  Person and Place    Oral Assessment:  Oral Motor   Labial: No impairment  Dentition: Natural  Oral Hygiene: Moist, Clean  Lingual: No impairment  Mandible: No impairment        P.O. Trials:  PO Trials  Assessment Method(s): Observation, Palpation  Patient Position: Partially reclined in bed  Vocal Quality: Hoarse  Consistency Presented: Regular, Pureed, Thin, Ice

## 2024-08-12 NOTE — PROGRESS NOTES
Comprehensive Nutrition Assessment    Type and Reason for Visit:  Reassess    Nutrition Recommendations/Plan:   Continue current diet as tolerated per SLP recs. Added CCHO4 Restriction.  Order/trial Glucerna (each provides 220 kcal, 10g protein) BID and Gelatein (each provides 80 kcal, 20g protein) once daily.   Daily wts.  Continue to monitor tolerance of PO, compliance of oral supplements, weight, labs, and plan of care during admission.     Malnutrition Assessment:  Malnutrition Status:  At risk for malnutrition (Comment) (s/p ext, COVID) (08/12/24 1131)    Context:  Acute Illness       Nutrition Assessment:    Admitted from [facility] ER for dyspnea, found to have COVID 7/31. Transferred to [facility], requiring intubation 8/5. +COVID. Per ICU, pt was at [facility] ER on BIPAP x 5 days; presumed NPO status. TF initiated 8/7, was advancing towards goal. S/p extubation 8/11. SLP following, last eval 8/12, advanced PO diet to easy to chew, thin liquids. Will add oral supplements to increase calorie/protein intake opportunity and continue to monitor PO intake s/p extubation.    Nutrition Related Findings:    Pertinent Meds: pepcid, folic acid, lantus, humalog, solumedrol, zosyn, glycolax, thiamine, heparin  Pertinent Labs: BUN/Cr 49/1.46, GFR 37   Last BM: 08/11/24  Skin: Wound Type: None  Edema: generalized, extremity +1-2     Current Nutrition Intake & Therapies:    Average Meal Intake:  (PO diet advanced)     ADULT DIET; Easy to Chew    Anthropometric Measures:  Height: 170.2 cm (5' 7\")  Ideal Body Weight (IBW): 135 lbs (61 kg)    Admission Body Weight: 143.8 kg (317 lb 0.3 oz)  Current Body Weight: 141.5 kg (311 lb 15.2 oz) (Pt +4L overall per I&Os), 234.8 % IBW.    Current BMI (kg/m2): 48.8  Usual Body Weight: 147.9 kg (326 lb) (5/12/24)  % Weight Change (Calculated): -2.8  BMI Categories: Obese Class 3 (BMI 40.0 or greater)    Estimated Daily Nutrient Needs:  Energy Requirements Based On: Formula  Weight Used for  Energy Requirements: Current  Energy (kcal/day): 1938-2325 (MSJ 1-1.2)  Weight Used for Protein Requirements: Ideal    Protein (g/day): 123-153 (2-2.5)  Method Used for Fluid Requirements: 1 ml/kcal  Fluid (ml/day): 1938-2325    Nutrition Diagnosis:   Inadequate protein-energy intake related to acute injury/trauma, impaired respiratory function as evidenced by intake 0-25%, intake 26-50% (s/p extubation, PO diet just advanced)    Nutrition Interventions:   Food and/or Nutrient Delivery: Modify Current Diet, Start Oral Nutrition Supplement  Nutrition Education/Counseling: No recommendation at this time  Coordination of Nutrition Care: Continue to monitor while inpatient  Plan of Care discussed with: SLP    Goals:  Previous Goal Met: Progressing toward Goal(s) (slowly)  Goals: Meet at least 75% of estimated needs, by next RD assessment       Nutrition Monitoring and Evaluation:   Behavioral-Environmental Outcomes: None Identified  Food/Nutrient Intake Outcomes: Food and Nutrient Intake, Supplement Intake, Vitamin/Mineral Intake  Physical Signs/Symptoms Outcomes: Biochemical Data, Chewing or Swallowing, GI Status, Nausea or Vomiting, Fluid Status or Edema, Hemodynamic Status, Meal Time Behavior, Weight, Skin    Discharge Planning:    Continue current diet     Megan Dockweiler, MS, RD, CNSC  Contact: [phone]

## 2024-08-13 PROBLEM — J80 ARDS (ADULT RESPIRATORY DISTRESS SYNDROME) (HCC): Status: ACTIVE | Noted: 2024-08-13

## 2024-08-13 PROBLEM — U07.1 COVID: Status: ACTIVE | Noted: 2024-08-13

## 2024-08-13 PROBLEM — I26.99 PULMONARY EMBOLISM (HCC): Status: ACTIVE | Noted: 2024-08-13

## 2024-08-13 LAB
ANION GAP SERPL CALC-SCNC: 4 MMOL/L (ref 3–18)
APTT PPP: 144.9 SEC (ref 23–36.4)
APTT PPP: 65.1 SEC (ref 23–36.4)
BASOPHILS # BLD: 0 K/UL (ref 0–0.1)
BASOPHILS NFR BLD: 0 % (ref 0–2)
BUN SERPL-MCNC: 37 MG/DL (ref 7–18)
BUN/CREAT SERPL: 28 (ref 12–20)
CALCIUM SERPL-MCNC: 8.5 MG/DL (ref 8.5–10.1)
CHLORIDE SERPL-SCNC: 108 MMOL/L (ref 100–111)
CO2 SERPL-SCNC: 31 MMOL/L (ref 21–32)
CREAT SERPL-MCNC: 1.31 MG/DL (ref 0.6–1.3)
DIFFERENTIAL METHOD BLD: ABNORMAL
EOSINOPHIL # BLD: 0.3 K/UL (ref 0–0.4)
EOSINOPHIL NFR BLD: 2 % (ref 0–5)
ERYTHROCYTE [DISTWIDTH] IN BLOOD BY AUTOMATED COUNT: 15.8 % (ref 11.6–14.5)
GLUCOSE BLD STRIP.AUTO-MCNC: 118 MG/DL (ref 70–110)
GLUCOSE BLD STRIP.AUTO-MCNC: 150 MG/DL (ref 70–110)
GLUCOSE BLD STRIP.AUTO-MCNC: 200 MG/DL (ref 70–110)
GLUCOSE BLD STRIP.AUTO-MCNC: 267 MG/DL (ref 70–110)
GLUCOSE BLD STRIP.AUTO-MCNC: 44 MG/DL (ref 70–110)
GLUCOSE BLD STRIP.AUTO-MCNC: 45 MG/DL (ref 70–110)
GLUCOSE BLD STRIP.AUTO-MCNC: 51 MG/DL (ref 70–110)
GLUCOSE BLD STRIP.AUTO-MCNC: 60 MG/DL (ref 70–110)
GLUCOSE BLD STRIP.AUTO-MCNC: 60 MG/DL (ref 70–110)
GLUCOSE BLD STRIP.AUTO-MCNC: 68 MG/DL (ref 70–110)
GLUCOSE BLD STRIP.AUTO-MCNC: 69 MG/DL (ref 70–110)
GLUCOSE SERPL-MCNC: 51 MG/DL (ref 74–99)
HCT VFR BLD AUTO: 39.5 % (ref 35–45)
HGB BLD-MCNC: 11.9 G/DL (ref 12–16)
IMM GRANULOCYTES # BLD AUTO: 0.2 K/UL (ref 0–0.04)
IMM GRANULOCYTES NFR BLD AUTO: 1 % (ref 0–0.5)
LYMPHOCYTES # BLD: 1.9 K/UL (ref 0.9–3.6)
LYMPHOCYTES NFR BLD: 11 % (ref 21–52)
MAGNESIUM SERPL-MCNC: 2.5 MG/DL (ref 1.6–2.6)
MCH RBC QN AUTO: 28.2 PG (ref 24–34)
MCHC RBC AUTO-ENTMCNC: 30.1 G/DL (ref 31–37)
MCV RBC AUTO: 93.6 FL (ref 78–100)
MONOCYTES # BLD: 0.7 K/UL (ref 0.05–1.2)
MONOCYTES NFR BLD: 4 % (ref 3–10)
NEUTS SEG # BLD: 14.4 K/UL (ref 1.8–8)
NEUTS SEG NFR BLD: 82 % (ref 40–73)
NRBC # BLD: 0.02 K/UL (ref 0–0.01)
NRBC BLD-RTO: 0.1 PER 100 WBC
PHOSPHATE SERPL-MCNC: 3.2 MG/DL (ref 2.5–4.9)
PLATELET # BLD AUTO: 218 K/UL (ref 135–420)
PMV BLD AUTO: 11.8 FL (ref 9.2–11.8)
POTASSIUM SERPL-SCNC: 4.4 MMOL/L (ref 3.5–5.5)
PROCALCITONIN SERPL-MCNC: 0.16 NG/ML
RBC # BLD AUTO: 4.22 M/UL (ref 4.2–5.3)
SODIUM SERPL-SCNC: 143 MMOL/L (ref 136–145)
WBC # BLD AUTO: 17.6 K/UL (ref 4.6–13.2)

## 2024-08-13 PROCEDURE — 94660 CPAP INITIATION&MGMT: CPT

## 2024-08-13 PROCEDURE — 6360000002 HC RX W HCPCS: Performed by: PHYSICIAN ASSISTANT

## 2024-08-13 PROCEDURE — 94761 N-INVAS EAR/PLS OXIMETRY MLT: CPT

## 2024-08-13 PROCEDURE — 2580000003 HC RX 258: Performed by: INTERNAL MEDICINE

## 2024-08-13 PROCEDURE — 6370000000 HC RX 637 (ALT 250 FOR IP): Performed by: NURSE PRACTITIONER

## 2024-08-13 PROCEDURE — 84145 PROCALCITONIN (PCT): CPT

## 2024-08-13 PROCEDURE — 2700000000 HC OXYGEN THERAPY PER DAY

## 2024-08-13 PROCEDURE — 85730 THROMBOPLASTIN TIME PARTIAL: CPT

## 2024-08-13 PROCEDURE — 6370000000 HC RX 637 (ALT 250 FOR IP)

## 2024-08-13 PROCEDURE — 82962 GLUCOSE BLOOD TEST: CPT

## 2024-08-13 PROCEDURE — 36415 COLL VENOUS BLD VENIPUNCTURE: CPT

## 2024-08-13 PROCEDURE — 84100 ASSAY OF PHOSPHORUS: CPT

## 2024-08-13 PROCEDURE — 99232 SBSQ HOSP IP/OBS MODERATE 35: CPT | Performed by: STUDENT IN AN ORGANIZED HEALTH CARE EDUCATION/TRAINING PROGRAM

## 2024-08-13 PROCEDURE — 2000000000 HC ICU R&B

## 2024-08-13 PROCEDURE — 80048 BASIC METABOLIC PNL TOTAL CA: CPT

## 2024-08-13 PROCEDURE — 85025 COMPLETE CBC W/AUTO DIFF WBC: CPT

## 2024-08-13 PROCEDURE — 83735 ASSAY OF MAGNESIUM: CPT

## 2024-08-13 PROCEDURE — 6370000000 HC RX 637 (ALT 250 FOR IP): Performed by: STUDENT IN AN ORGANIZED HEALTH CARE EDUCATION/TRAINING PROGRAM

## 2024-08-13 PROCEDURE — 2580000003 HC RX 258: Performed by: NURSE PRACTITIONER

## 2024-08-13 PROCEDURE — 6360000002 HC RX W HCPCS: Performed by: NURSE PRACTITIONER

## 2024-08-13 RX ORDER — LACTOBACILLUS RHAMNOSUS GG 10B CELL
1 CAPSULE ORAL
Status: DISCONTINUED | OUTPATIENT
Start: 2024-08-13 | End: 2024-08-24 | Stop reason: HOSPADM

## 2024-08-13 RX ADMIN — INSULIN LISPRO 4 UNITS: 100 INJECTION, SOLUTION INTRAVENOUS; SUBCUTANEOUS at 18:58

## 2024-08-13 RX ADMIN — HEPARIN SODIUM 12 UNITS/KG/HR: 10000 INJECTION, SOLUTION INTRAVENOUS at 14:08

## 2024-08-13 RX ADMIN — THIAMINE HYDROCHLORIDE 200 MG: 100 INJECTION, SOLUTION INTRAMUSCULAR; INTRAVENOUS at 14:30

## 2024-08-13 RX ADMIN — Medication 1 CAPSULE: at 17:59

## 2024-08-13 RX ADMIN — DEXTROSE MONOHYDRATE: 100 INJECTION, SOLUTION INTRAVENOUS at 08:54

## 2024-08-13 RX ADMIN — THIAMINE HYDROCHLORIDE 200 MG: 100 INJECTION, SOLUTION INTRAMUSCULAR; INTRAVENOUS at 08:41

## 2024-08-13 RX ADMIN — FOLIC ACID 1 MG: 1 TABLET ORAL at 08:41

## 2024-08-13 RX ADMIN — THIAMINE HYDROCHLORIDE 200 MG: 100 INJECTION, SOLUTION INTRAMUSCULAR; INTRAVENOUS at 20:20

## 2024-08-13 RX ADMIN — SODIUM CHLORIDE, PRESERVATIVE FREE 10 ML: 5 INJECTION INTRAVENOUS at 20:20

## 2024-08-13 RX ADMIN — HEPARIN SODIUM 5000 UNITS: 1000 INJECTION INTRAVENOUS; SUBCUTANEOUS at 06:52

## 2024-08-13 RX ADMIN — SODIUM CHLORIDE, PRESERVATIVE FREE 10 ML: 5 INJECTION INTRAVENOUS at 08:43

## 2024-08-13 RX ADMIN — PREDNISONE 40 MG: 20 TABLET ORAL at 08:41

## 2024-08-13 RX ADMIN — INSULIN LISPRO 8 UNITS: 100 INJECTION, SOLUTION INTRAVENOUS; SUBCUTANEOUS at 22:03

## 2024-08-13 RX ADMIN — Medication 16 G: at 02:41

## 2024-08-13 ASSESSMENT — PAIN SCALES - GENERAL
PAINLEVEL_OUTOF10: 0

## 2024-08-13 NOTE — PROGRESS NOTES
[facility] Hospitalist Group  Progress Note  Date:2024       Room:[facility]  Patient Name:Jasmin Pacheco     YOB: 1945     Age:78 y.o.        Subjective    Subjective   Review of Systems    Patient had BiPAP on.  She was comfortable.  She had no complaints.    Disposition: Patient is still intermittently on high flow nasal cannula versus BiPAP.  Once oxygen requirement is weaned down.  Patient would likely be stable for discharge to SNF on 8/15    Objective         Vitals Last 24 Hours:  TEMPERATURE:  Temp  Av °F (36.7 °C)  Min: 98 °F (36.7 °C)  Max: 98 °F (36.7 °C)  RESPIRATIONS RANGE: Resp  Av.4  Min: 13  Max: 25  PULSE OXIMETRY RANGE: SpO2  Av %  Min: 93 %  Max: 100 %  PULSE RANGE: Pulse  Av.1  Min: 0  Max: 86  BLOOD PRESSURE RANGE: Systolic (24hrs), Av , Min:115 , Max:166     ; Diastolic (24hrs), Av, Min:63, Max:110      I/O (24Hr):    Intake/Output Summary (Last 24 hours) at 2024 1239  Last data filed at 2024 1100  Gross per 24 hour   Intake 359.31 ml   Output 700 ml   Net -340.69 ml     Objective:  General Appearance:  Comfortable.    Vital signs: (most recent): Blood pressure (!) 154/79, pulse 60, temperature 98 °F (36.7 °C), temperature source Axillary, resp. rate 18, height 1.702 m (5' 7\"), weight (!) 140.3 kg (309 lb 4.9 oz), SpO2 94%.    Output: Producing urine.    HEENT: Normal HEENT exam.  (Bipap  )    Lungs:  Normal effort and normal respiratory rate.  Breath sounds clear to auscultation.    Heart: Normal rate.  Regular rhythm.    Abdomen: Abdomen is soft and flat.  Bowel sounds are normal.   There is no abdominal tenderness.     Extremities: Normal range of motion.    Pulses: Distal pulses are intact.    Neurological: Patient is alert and oriented to person, place and time.    Skin:  Warm and dry.          Labs/Imaging/Diagnostics    Labs:  CBC:  Recent Labs     24  0524 24  0310 24  0157   WBC 19.6* 18.2*  scan shows high probability of PE on 8/4).     Plan:   Administer medications as ordered.   (    -Wean off oxygen as tolerated  - Continue p.o. steroids.End date 8/17  - Status post course of Zosyn.  Repeat procalcitonin is negative  - Continue heparin drip.  Will transition to Eliquis prior to discharge  - Due to loose bowels, ordered FMS and lactobacillus  - Due to nighttime hypoglycemia, hold glargine on 8/13  - N.p.o. while on BiPAP).       Case discussed with:  [x]Patient  []Family  [x]Nursing  []Case Management  DVT Prophylaxis:  []Lovenox  []Hep SQ  []SCDs  []Coumadin   [x]Heparin gtt   []Xarelto   []Eliquis    Rc Rangel DO, MBA, MS [facility]  Hospitalist

## 2024-08-13 NOTE — PROGRESS NOTES
Pt alert and oriented x4.  Glucometer reading 45 and 51 on blood sugar check.  Gave pt 2 glasses of orange juice to increase blood sugar at this time.  Will reassess pt in 15min.

## 2024-08-13 NOTE — PLAN OF CARE
0330 woke up and called her son, she was startled and confused, thought no one had been with her in a while, son reoriented her that she is here in the hospital and then he called the phone here and I spoke with him. She had been woken up by the lab to get blood specimens then fallen back asleep for a few minutes and then woke up again. I reassured her that her vitals were good and that I was watching her and that she was doing good. She stated \"Ok good\" and closed her eyes.    79 Y/o female full code  Isolation Droplet plus, Covid  Allergies: Azithromycin  Arrived: 07/30/24 EMS to [facility] with c/o SOB, reported she wears a CPAP at night and 1 LPM NC during the day,     Diagnosis: Acute on chronic respiratory failure, COVID, pneumonia, CHF    History: COPD, CHF    Neuro alert and oriented very pleasant, no fevers and no pain noted  Respiratory: high flow nasal cannula and bipap  Cardiac NSR to sinus bradycardia, on a heparin drip at 11 with next PTT due at 1000 am  GI 4 carb easy to chew diet with thin liquids   fms and pure wick  Skin  lines    1920 bedside turnover given to me by VINAYAK Isabel. Pt is in bed on heated high flow oxygen and cardiac telemetry monitor. All vitals are WNL. The bed is in the lowest position with the wheels locked and call bell on her bed side table within reach with all of her personal effects.    1945 asked to have her dinner heated, given dinner and a ginger ale.     2005 pt's two sons at bedside in appropriate isolation gear.    2100 vitals pain and needs assessed, pt has no pain no needs  2200 medications given per MAR< pain and needs reassessed    Blood glucose assessed and insulin given accordingly      Problem: Safety - Adult  Goal: Free from fall injury  Outcome: Progressing     Problem: Chronic Conditions and Co-morbidities  Goal: Patient's chronic conditions and co-morbidity symptoms are monitored and maintained or improved  Outcome: Progressing     Problem:

## 2024-08-14 LAB
ANION GAP SERPL CALC-SCNC: 5 MMOL/L (ref 3–18)
APTT PPP: 108.6 SEC (ref 23–36.4)
APTT PPP: 117 SEC (ref 23–36.4)
APTT PPP: 60.8 SEC (ref 23–36.4)
BASOPHILS # BLD: 0 K/UL (ref 0–0.1)
BASOPHILS NFR BLD: 0 % (ref 0–2)
BUN SERPL-MCNC: 32 MG/DL (ref 7–18)
BUN/CREAT SERPL: 27 (ref 12–20)
CALCIUM SERPL-MCNC: 8.1 MG/DL (ref 8.5–10.1)
CHLORIDE SERPL-SCNC: 103 MMOL/L (ref 100–111)
CO2 SERPL-SCNC: 32 MMOL/L (ref 21–32)
CREAT SERPL-MCNC: 1.19 MG/DL (ref 0.6–1.3)
DIFFERENTIAL METHOD BLD: ABNORMAL
EOSINOPHIL # BLD: 0.2 K/UL (ref 0–0.4)
EOSINOPHIL NFR BLD: 1 % (ref 0–5)
ERYTHROCYTE [DISTWIDTH] IN BLOOD BY AUTOMATED COUNT: 15.5 % (ref 11.6–14.5)
GLUCOSE BLD STRIP.AUTO-MCNC: 105 MG/DL (ref 70–110)
GLUCOSE BLD STRIP.AUTO-MCNC: 164 MG/DL (ref 70–110)
GLUCOSE BLD STRIP.AUTO-MCNC: 218 MG/DL (ref 70–110)
GLUCOSE BLD STRIP.AUTO-MCNC: 232 MG/DL (ref 70–110)
GLUCOSE SERPL-MCNC: 160 MG/DL (ref 74–99)
HCT VFR BLD AUTO: 34.3 % (ref 35–45)
HGB BLD-MCNC: 10.5 G/DL (ref 12–16)
IMM GRANULOCYTES # BLD AUTO: 0.2 K/UL (ref 0–0.04)
IMM GRANULOCYTES NFR BLD AUTO: 1 % (ref 0–0.5)
LYMPHOCYTES # BLD: 2 K/UL (ref 0.9–3.6)
LYMPHOCYTES NFR BLD: 11 % (ref 21–52)
MAGNESIUM SERPL-MCNC: 2.5 MG/DL (ref 1.6–2.6)
MCH RBC QN AUTO: 28.6 PG (ref 24–34)
MCHC RBC AUTO-ENTMCNC: 30.6 G/DL (ref 31–37)
MCV RBC AUTO: 93.5 FL (ref 78–100)
MONOCYTES # BLD: 1 K/UL (ref 0.05–1.2)
MONOCYTES NFR BLD: 5 % (ref 3–10)
NEUTS SEG # BLD: 15.5 K/UL (ref 1.8–8)
NEUTS SEG NFR BLD: 82 % (ref 40–73)
NRBC # BLD: 0.04 K/UL (ref 0–0.01)
NRBC BLD-RTO: 0.2 PER 100 WBC
PHOSPHATE SERPL-MCNC: 2.8 MG/DL (ref 2.5–4.9)
PLATELET # BLD AUTO: 245 K/UL (ref 135–420)
PMV BLD AUTO: 11.1 FL (ref 9.2–11.8)
POTASSIUM SERPL-SCNC: 4.2 MMOL/L (ref 3.5–5.5)
RBC # BLD AUTO: 3.67 M/UL (ref 4.2–5.3)
SODIUM SERPL-SCNC: 140 MMOL/L (ref 136–145)
WBC # BLD AUTO: 18.8 K/UL (ref 4.6–13.2)

## 2024-08-14 PROCEDURE — 83735 ASSAY OF MAGNESIUM: CPT

## 2024-08-14 PROCEDURE — 85730 THROMBOPLASTIN TIME PARTIAL: CPT

## 2024-08-14 PROCEDURE — 92526 ORAL FUNCTION THERAPY: CPT

## 2024-08-14 PROCEDURE — 6370000000 HC RX 637 (ALT 250 FOR IP)

## 2024-08-14 PROCEDURE — 94761 N-INVAS EAR/PLS OXIMETRY MLT: CPT

## 2024-08-14 PROCEDURE — 6370000000 HC RX 637 (ALT 250 FOR IP): Performed by: NURSE PRACTITIONER

## 2024-08-14 PROCEDURE — 6370000000 HC RX 637 (ALT 250 FOR IP): Performed by: STUDENT IN AN ORGANIZED HEALTH CARE EDUCATION/TRAINING PROGRAM

## 2024-08-14 PROCEDURE — 6360000002 HC RX W HCPCS: Performed by: PHYSICIAN ASSISTANT

## 2024-08-14 PROCEDURE — 85025 COMPLETE CBC W/AUTO DIFF WBC: CPT

## 2024-08-14 PROCEDURE — 36415 COLL VENOUS BLD VENIPUNCTURE: CPT

## 2024-08-14 PROCEDURE — 6360000002 HC RX W HCPCS: Performed by: NURSE PRACTITIONER

## 2024-08-14 PROCEDURE — 2000000000 HC ICU R&B

## 2024-08-14 PROCEDURE — 80048 BASIC METABOLIC PNL TOTAL CA: CPT

## 2024-08-14 PROCEDURE — 94660 CPAP INITIATION&MGMT: CPT

## 2024-08-14 PROCEDURE — 99232 SBSQ HOSP IP/OBS MODERATE 35: CPT | Performed by: STUDENT IN AN ORGANIZED HEALTH CARE EDUCATION/TRAINING PROGRAM

## 2024-08-14 PROCEDURE — 2580000003 HC RX 258: Performed by: INTERNAL MEDICINE

## 2024-08-14 PROCEDURE — 84100 ASSAY OF PHOSPHORUS: CPT

## 2024-08-14 PROCEDURE — 82962 GLUCOSE BLOOD TEST: CPT

## 2024-08-14 PROCEDURE — 2700000000 HC OXYGEN THERAPY PER DAY

## 2024-08-14 RX ORDER — INSULIN LISPRO 100 [IU]/ML
0-16 INJECTION, SOLUTION INTRAVENOUS; SUBCUTANEOUS
Status: DISCONTINUED | OUTPATIENT
Start: 2024-08-14 | End: 2024-08-24 | Stop reason: HOSPADM

## 2024-08-14 RX ADMIN — PREDNISONE 40 MG: 20 TABLET ORAL at 08:30

## 2024-08-14 RX ADMIN — SODIUM CHLORIDE, PRESERVATIVE FREE 10 ML: 5 INJECTION INTRAVENOUS at 08:40

## 2024-08-14 RX ADMIN — HEPARIN SODIUM 5000 UNITS: 1000 INJECTION INTRAVENOUS; SUBCUTANEOUS at 03:58

## 2024-08-14 RX ADMIN — THIAMINE HYDROCHLORIDE 200 MG: 100 INJECTION, SOLUTION INTRAMUSCULAR; INTRAVENOUS at 08:30

## 2024-08-14 RX ADMIN — Medication 1 CAPSULE: at 08:30

## 2024-08-14 RX ADMIN — SODIUM CHLORIDE, PRESERVATIVE FREE 10 ML: 5 INJECTION INTRAVENOUS at 20:48

## 2024-08-14 RX ADMIN — INSULIN LISPRO 4 UNITS: 100 INJECTION, SOLUTION INTRAVENOUS; SUBCUTANEOUS at 18:05

## 2024-08-14 RX ADMIN — INSULIN LISPRO 4 UNITS: 100 INJECTION, SOLUTION INTRAVENOUS; SUBCUTANEOUS at 11:55

## 2024-08-14 RX ADMIN — THIAMINE HYDROCHLORIDE 200 MG: 100 INJECTION, SOLUTION INTRAMUSCULAR; INTRAVENOUS at 16:31

## 2024-08-14 RX ADMIN — THIAMINE HYDROCHLORIDE 200 MG: 100 INJECTION, SOLUTION INTRAMUSCULAR; INTRAVENOUS at 20:48

## 2024-08-14 RX ADMIN — FOLIC ACID 1 MG: 1 TABLET ORAL at 08:30

## 2024-08-14 ASSESSMENT — PAIN SCALES - GENERAL
PAINLEVEL_OUTOF10: 0

## 2024-08-14 ASSESSMENT — PAIN SCALES - WONG BAKER: WONGBAKER_NUMERICALRESPONSE: NO HURT

## 2024-08-14 NOTE — PLAN OF CARE
Problem: SLP Adult - Impaired Swallowing  Goal: By Discharge: Advance to least restrictive diet without signs or symptoms of aspiration for planned discharge setting.  See evaluation for individualized goals.  Description: Patient will:  1. Tolerate PO trials with 0 s/s overt distress in 4/5 trials  2. Utilize compensatory swallow strategies/maneuvers (decrease bite/sip, size/rate, alt. liq/sol) with min cues in 4/5 trials  3. Perform oral-motor/laryngeal exercises to increase oropharyngeal swallow function with min cues  4. Complete an objective swallow study (i.e., MBSS) to assess swallow integrity, r/o aspiration, and determine of safest LRD, min A as indicated/ordered by MD     Rec:     Easy to chew diet with nectar-thick liquids  Aspiration precautions  HOB >45 during po intake, remain >30 for 30-45 minutes after po   Small bites/sips; alternate liquid/solid with slow feeding rate   Oral care TID  Meds per pt preference      Outcome: Not Progressing   SPEECH LANGUAGE PATHOLOGY DYSPHAGIA TREATMENT    Patient: Jasmin Pacheco (78 y.o. female)  Date: 8/14/2024  Diagnosis: Acute respiratory failure with hypoxia and hypercarbia (HCC) [J96.01, J96.02] Acute respiratory failure with hypoxia and hypercarbia (HCC)      Precautions: Standard, aspiration  PLOF: As per H&P      ASSESSMENT:  Pt was seen at bedside for follow up dysphagia management. Pt on 30L 40% FiO2 HFNC. She demo increase in RR and throat clear/cough s/p thin liquid trials. Improved tolerance of reg solid, puree, and nectar-thick liquid trials; laryngeal elevation was decreased to palpation. Pt demo labored mastication of solids with positive oral clearance during cyclic ingestion. Recommend diet change to easy to chew with nectar-thick liquids, aspiration precautions, oral care TID, and meds as tolerated. ST will continue to follow to ensure safety of PO.     Progression toward goals:  []         Improving appropriately and progressing toward goals  []

## 2024-08-15 LAB
ANION GAP SERPL CALC-SCNC: 2 MMOL/L (ref 3–18)
APTT PPP: 134 SEC (ref 23–36.4)
APTT PPP: 57.5 SEC (ref 23–36.4)
APTT PPP: 82 SEC (ref 23–36.4)
BASOPHILS # BLD: 0 K/UL (ref 0–0.1)
BASOPHILS NFR BLD: 0 % (ref 0–2)
BUN SERPL-MCNC: 25 MG/DL (ref 7–18)
BUN/CREAT SERPL: 23 (ref 12–20)
CALCIUM SERPL-MCNC: 8.4 MG/DL (ref 8.5–10.1)
CHLORIDE SERPL-SCNC: 105 MMOL/L (ref 100–111)
CO2 SERPL-SCNC: 32 MMOL/L (ref 21–32)
CREAT SERPL-MCNC: 1.07 MG/DL (ref 0.6–1.3)
DIFFERENTIAL METHOD BLD: ABNORMAL
EOSINOPHIL # BLD: 0.2 K/UL (ref 0–0.4)
EOSINOPHIL NFR BLD: 1 % (ref 0–5)
ERYTHROCYTE [DISTWIDTH] IN BLOOD BY AUTOMATED COUNT: 15.5 % (ref 11.6–14.5)
GLUCOSE BLD STRIP.AUTO-MCNC: 173 MG/DL (ref 70–110)
GLUCOSE BLD STRIP.AUTO-MCNC: 204 MG/DL (ref 70–110)
GLUCOSE BLD STRIP.AUTO-MCNC: 213 MG/DL (ref 70–110)
GLUCOSE BLD STRIP.AUTO-MCNC: 232 MG/DL (ref 70–110)
GLUCOSE SERPL-MCNC: 139 MG/DL (ref 74–99)
HCT VFR BLD AUTO: 33.5 % (ref 35–45)
HGB BLD-MCNC: 10.2 G/DL (ref 12–16)
IMM GRANULOCYTES # BLD AUTO: 0.1 K/UL (ref 0–0.04)
IMM GRANULOCYTES NFR BLD AUTO: 1 % (ref 0–0.5)
LYMPHOCYTES # BLD: 2.1 K/UL (ref 0.9–3.6)
LYMPHOCYTES NFR BLD: 13 % (ref 21–52)
MAGNESIUM SERPL-MCNC: 2.2 MG/DL (ref 1.6–2.6)
MCH RBC QN AUTO: 28.3 PG (ref 24–34)
MCHC RBC AUTO-ENTMCNC: 30.4 G/DL (ref 31–37)
MCV RBC AUTO: 93.1 FL (ref 78–100)
MONOCYTES # BLD: 1.1 K/UL (ref 0.05–1.2)
MONOCYTES NFR BLD: 7 % (ref 3–10)
NEUTS SEG # BLD: 12.8 K/UL (ref 1.8–8)
NEUTS SEG NFR BLD: 78 % (ref 40–73)
NRBC # BLD: 0 K/UL (ref 0–0.01)
NRBC BLD-RTO: 0 PER 100 WBC
PHOSPHATE SERPL-MCNC: 2.8 MG/DL (ref 2.5–4.9)
PLATELET # BLD AUTO: 275 K/UL (ref 135–420)
PMV BLD AUTO: 10.9 FL (ref 9.2–11.8)
POTASSIUM SERPL-SCNC: 4.4 MMOL/L (ref 3.5–5.5)
RBC # BLD AUTO: 3.6 M/UL (ref 4.2–5.3)
SODIUM SERPL-SCNC: 139 MMOL/L (ref 136–145)
WBC # BLD AUTO: 16.4 K/UL (ref 4.6–13.2)

## 2024-08-15 PROCEDURE — 6370000000 HC RX 637 (ALT 250 FOR IP): Performed by: PHYSICIAN ASSISTANT

## 2024-08-15 PROCEDURE — 2580000003 HC RX 258: Performed by: INTERNAL MEDICINE

## 2024-08-15 PROCEDURE — 94761 N-INVAS EAR/PLS OXIMETRY MLT: CPT

## 2024-08-15 PROCEDURE — 2000000000 HC ICU R&B

## 2024-08-15 PROCEDURE — 6370000000 HC RX 637 (ALT 250 FOR IP): Performed by: STUDENT IN AN ORGANIZED HEALTH CARE EDUCATION/TRAINING PROGRAM

## 2024-08-15 PROCEDURE — 94660 CPAP INITIATION&MGMT: CPT

## 2024-08-15 PROCEDURE — 6370000000 HC RX 637 (ALT 250 FOR IP): Performed by: NURSE PRACTITIONER

## 2024-08-15 PROCEDURE — 99232 SBSQ HOSP IP/OBS MODERATE 35: CPT | Performed by: STUDENT IN AN ORGANIZED HEALTH CARE EDUCATION/TRAINING PROGRAM

## 2024-08-15 PROCEDURE — 6370000000 HC RX 637 (ALT 250 FOR IP): Performed by: INTERNAL MEDICINE

## 2024-08-15 PROCEDURE — 85025 COMPLETE CBC W/AUTO DIFF WBC: CPT

## 2024-08-15 PROCEDURE — 83735 ASSAY OF MAGNESIUM: CPT

## 2024-08-15 PROCEDURE — 85730 THROMBOPLASTIN TIME PARTIAL: CPT

## 2024-08-15 PROCEDURE — 6360000002 HC RX W HCPCS: Performed by: PHYSICIAN ASSISTANT

## 2024-08-15 PROCEDURE — 36415 COLL VENOUS BLD VENIPUNCTURE: CPT

## 2024-08-15 PROCEDURE — 84100 ASSAY OF PHOSPHORUS: CPT

## 2024-08-15 PROCEDURE — 82962 GLUCOSE BLOOD TEST: CPT

## 2024-08-15 PROCEDURE — 6360000002 HC RX W HCPCS: Performed by: NURSE PRACTITIONER

## 2024-08-15 PROCEDURE — 80048 BASIC METABOLIC PNL TOTAL CA: CPT

## 2024-08-15 PROCEDURE — 2700000000 HC OXYGEN THERAPY PER DAY

## 2024-08-15 PROCEDURE — 6370000000 HC RX 637 (ALT 250 FOR IP)

## 2024-08-15 RX ADMIN — THIAMINE HYDROCHLORIDE 200 MG: 100 INJECTION, SOLUTION INTRAMUSCULAR; INTRAVENOUS at 17:55

## 2024-08-15 RX ADMIN — SODIUM CHLORIDE, PRESERVATIVE FREE 10 ML: 5 INJECTION INTRAVENOUS at 20:47

## 2024-08-15 RX ADMIN — Medication 1 CAPSULE: at 08:06

## 2024-08-15 RX ADMIN — FOLIC ACID 1 MG: 1 TABLET ORAL at 08:06

## 2024-08-15 RX ADMIN — SODIUM CHLORIDE, PRESERVATIVE FREE 10 ML: 5 INJECTION INTRAVENOUS at 08:06

## 2024-08-15 RX ADMIN — ACETAMINOPHEN 325MG 650 MG: 325 TABLET ORAL at 12:52

## 2024-08-15 RX ADMIN — HEPARIN SODIUM 5000 UNITS: 1000 INJECTION INTRAVENOUS; SUBCUTANEOUS at 12:57

## 2024-08-15 RX ADMIN — INSULIN LISPRO 4 UNITS: 100 INJECTION, SOLUTION INTRAVENOUS; SUBCUTANEOUS at 17:17

## 2024-08-15 RX ADMIN — POLYETHYLENE GLYCOL 3350 34 G: 17 POWDER, FOR SOLUTION ORAL at 09:00

## 2024-08-15 RX ADMIN — PREDNISONE 40 MG: 20 TABLET ORAL at 08:05

## 2024-08-15 RX ADMIN — HEPARIN SODIUM 9 UNITS/KG/HR: 10000 INJECTION, SOLUTION INTRAVENOUS at 02:57

## 2024-08-15 RX ADMIN — THIAMINE HYDROCHLORIDE 200 MG: 100 INJECTION, SOLUTION INTRAMUSCULAR; INTRAVENOUS at 20:47

## 2024-08-15 RX ADMIN — INSULIN LISPRO 4 UNITS: 100 INJECTION, SOLUTION INTRAVENOUS; SUBCUTANEOUS at 21:03

## 2024-08-15 RX ADMIN — THIAMINE HYDROCHLORIDE 200 MG: 100 INJECTION, SOLUTION INTRAMUSCULAR; INTRAVENOUS at 08:06

## 2024-08-15 RX ADMIN — HEPARIN SODIUM 11 UNITS/KG/HR: 10000 INJECTION, SOLUTION INTRAVENOUS at 20:53

## 2024-08-15 RX ADMIN — INSULIN LISPRO 4 UNITS: 100 INJECTION, SOLUTION INTRAVENOUS; SUBCUTANEOUS at 12:51

## 2024-08-15 ASSESSMENT — PAIN SCALES - GENERAL
PAINLEVEL_OUTOF10: 0
PAINLEVEL_OUTOF10: 0
PAINLEVEL_OUTOF10: 6
PAINLEVEL_OUTOF10: 0
PAINLEVEL_OUTOF10: 0

## 2024-08-15 ASSESSMENT — PAIN DESCRIPTION - LOCATION: LOCATION: BACK

## 2024-08-15 ASSESSMENT — PAIN SCALES - WONG BAKER
WONGBAKER_NUMERICALRESPONSE: NO HURT
WONGBAKER_NUMERICALRESPONSE: NO HURT

## 2024-08-15 ASSESSMENT — PAIN DESCRIPTION - DESCRIPTORS: DESCRIPTORS: ACHING

## 2024-08-15 NOTE — PLAN OF CARE
Problem: Safety - Adult  Goal: Free from fall injury  Outcome: Progressing     Problem: Chronic Conditions and Co-morbidities  Goal: Patient's chronic conditions and co-morbidity symptoms are monitored and maintained or improved  Outcome: Progressing     Problem: Pain  Goal: Verbalizes/displays adequate comfort level or baseline comfort level  Outcome: Progressing     Problem: ABCDS Injury Assessment  Goal: Absence of physical injury  Outcome: Progressing     Problem: Nutrition Deficit:  Goal: Optimize nutritional status  Outcome: Progressing  Flowsheets (Taken 8/15/2024 1006 by Dockweiler, Megan, RD)  Nutrient intake appropriate for improving, restoring, or maintaining nutritional needs:   Assess nutritional status and recommend course of action   Monitor oral intake, labs, and treatment plans   Recommend appropriate diets, oral nutritional supplements, and vitamin/mineral supplements     Problem: Skin/Tissue Integrity  Goal: Absence of new skin breakdown  Description: 1.  Monitor for areas of redness and/or skin breakdown  2.  Assess vascular access sites hourly  3.  Every 4-6 hours minimum:  Change oxygen saturation probe site  4.  Every 4-6 hours:  If on nasal continuous positive airway pressure, respiratory therapy assess nares and determine need for appliance change or resting period.  Outcome: Progressing

## 2024-08-15 NOTE — PROGRESS NOTES
Holding PT d/t increased respiratory distress. Presents on bipap on two attempts (5580, 1007). Will follow up.

## 2024-08-15 NOTE — PROGRESS NOTES
[facility] Hospitalist [facility]  Progress Note  Date:2024       Room:[facility]  Patient Name:Jasmin Pacheco     YOB: 1945     Age:78 y.o.        Subjective    Subjective   Review of Systems    Patient on BIPAP.  She was comfortable.  She had no complaints.    Disposition: Patient is still intermittently on high flow nasal cannula versus BiPAP.  Once oxygen requirement is weaned down.  Patient would likely be stable for discharge to SNF on     Objective         Vitals Last 24 Hours:  TEMPERATURE:  Temp  Av.7 °F (36.5 °C)  Min: 97.1 °F (36.2 °C)  Max: 98.1 °F (36.7 °C)  RESPIRATIONS RANGE: Resp  Av  Min: 14  Max: 27  PULSE OXIMETRY RANGE: SpO2  Av.5 %  Min: 89 %  Max: 100 %  PULSE RANGE: Pulse  Av.9  Min: 53  Max: 106  BLOOD PRESSURE RANGE: Systolic (24hrs), Av , Min:113 , Max:168     ; Diastolic (24hrs), Av, Min:64, Max:151      I/O (24Hr):    Intake/Output Summary (Last 24 hours) at 2024  Last data filed at 2024 1900  Gross per 24 hour   Intake 601.66 ml   Output 1100 ml   Net -498.34 ml     Objective:  General Appearance:  Comfortable.    Vital signs: (most recent): Blood pressure (!) 153/72, pulse 64, temperature 97.7 °F (36.5 °C), temperature source Axillary, resp. rate 22, height 1.702 m (5' 7\"), weight (!) 140.3 kg (309 lb 4.9 oz), SpO2 97%.    Output: Producing urine.    HEENT: Normal HEENT exam.  (Bipap  )    Lungs:  Normal effort and normal respiratory rate.  Breath sounds clear to auscultation.    Heart: Normal rate.  Regular rhythm.    Abdomen: Abdomen is soft and flat.  Bowel sounds are normal.   There is no abdominal tenderness.     Extremities: Normal range of motion.    Pulses: Distal pulses are intact.    Neurological: Patient is alert and oriented to person, place and time.    Skin:  Warm and dry.          Labs/Imaging/Diagnostics    Labs:  CBC:  Recent Labs     24  0310 24  0157 24  0130   WBC 18.2*

## 2024-08-15 NOTE — PROGRESS NOTES
[facility]  Progress Note  Date:8/15/2024       Room:[facility], [facility]  Patient Name:Jasmin Pacheco     YOB: 1945     Age:78 y.o.        Subjective    Subjective   Review of Systems    Patient on BIPAP.  She was comfortable.  She had no complaints.    Disposition: Patient is still intermittently on high flow nasal cannula versus BiPAP.  Once oxygen requirement is weaned down.  Patient would likely be stable for discharge to SNF on     Objective         Vitals Last 24 Hours:  TEMPERATURE:  Temp  Av.6 °F (36.4 °C)  Min: 97.5 °F (36.4 °C)  Max: 97.7 °F (36.5 °C)  RESPIRATIONS RANGE: Resp  Av.7  Min: 17  Max: 23  PULSE OXIMETRY RANGE: SpO2  Av.2 %  Min: 92 %  Max: 100 %  PULSE RANGE: Pulse  Av.3  Min: 53  Max: 89  BLOOD PRESSURE RANGE: Systolic (24hrs), Av , Min:95 , Max:152     ; Diastolic (24hrs), Av, Min:63, Max:77      I/O (24Hr):    Intake/Output Summary (Last 24 hours) at 8/15/2024 1940  Last data filed at 8/15/2024 1855  Gross per 24 hour   Intake 812.74 ml   Output 700 ml   Net 112.74 ml     Objective:  General Appearance:  Comfortable.    Vital signs: (most recent): Blood pressure (!) 151/73, pulse 59, temperature 97.5 °F (36.4 °C), temperature source Axillary, resp. rate 20, height 1.702 m (5' 7\"), weight (!) 140.3 kg (309 lb 4.9 oz), SpO2 92%.    Output: Producing urine.    HEENT: Normal HEENT exam.  (Bipap  )    Lungs:  Normal effort and normal respiratory rate.  Breath sounds clear to auscultation.    Heart: Normal rate.  Regular rhythm.    Abdomen: Abdomen is soft and flat.  Bowel sounds are normal.   There is no abdominal tenderness.     Extremities: Normal range of motion.    Pulses: Distal pulses are intact.    Neurological: Patient is alert and oriented to person, place and time.    Skin:  Warm and dry.          Labs/Imaging/Diagnostics    Labs:  CBC:  Recent Labs     24  0157 24  0130 08/15/24  0459   WBC 17.6*

## 2024-08-15 NOTE — PROGRESS NOTES
NUTRITION FOLLOW UP NOTE    Admitted from [facility] for dyspnea, found to have COVID 7/31. Transferred to [facility], requiring intubation 8/5. +COVID. Per ICU, pt was at [facility] on BIPAP x 5 days; presumed NPO status. TF initiated 8/7, was advancing towards goal. S/p extubation 8/11. Downgraded out of ICU status. On/off BIPAP. SLP following, last eval 8/14, advanced PO diet to easy to chew, mildly thick liquids. Per flowsheet, pt ate 26-50% x 2 entries, last filed 8/14. Per RN, pt is eating well, ate ~75% of breakfast. Will add oral supplements to increase calorie/protein intake opportunity.     Nutrition Related Findings:   Pertinent Meds: folic acid, lactulose, humalog, prednisone, glycolax, thiamine  Pertinent Labs: POC Glucose 105-232 mg/dl x 24 hrs  Last BM: 08/14/24  Skin: Wound Type: None  Edema: generalized, extremity +1-2      Estimated Nutrition Needs   Energy Requirements Based On: Formula  Weight Used for Energy Requirements: Current  Energy (kcal/day): 8815-5903 (MSJ 1-1.2)  Weight Used for Protein Requirements: Ideal  Protein (g/day): 123-153 (2-2.5)  Method Used for Fluid Requirements: 1 ml/kcal  Fluid (ml/day): 5017-8736    Current Nutrition Therapies:  Average Meal Intake:  (PO diet advanced)     ADULT ORAL NUTRITION SUPPLEMENT; Lunch; Fortified Gelatin Oral Supplement  ADULT ORAL NUTRITION SUPPLEMENT; Breakfast, Dinner; Diabetic Oral Supplement  ADULT DIET; Easy to Chew; 4 carb choices (60 gm/meal); Mildly Thick (Nectar)   Meal Intake:   Patient Vitals for the past 168 hrs:   PO Meals Eaten (%)   08/14/24 0900 26 - 50%   08/13/24 2000 26 - 50%       Nutrition Recommendations/Plan:   Continue current diet as tolerated per SLP recs.  Modify oral supplements - rec Gelatein (each provides 80 kcal, 20g protein) BID and order Magic Cup (each provides 290 kcal, 9g protein) once daily due to thickened liquids.   Recommend/order MVI/min supplementation due to inadequate PO intake.  Daily wts.   Continue

## 2024-08-15 NOTE — PROGRESS NOTES
Patient has been on BIPAP most of the day. When at rest, Patient is on HFNC 30L / 40%         Patient is awake and alert, requests to be on BIPAP and will take off to HFNC to eat.

## 2024-08-16 LAB
ANION GAP SERPL CALC-SCNC: 4 MMOL/L (ref 3–18)
APTT PPP: 100.4 SEC (ref 23–36.4)
APTT PPP: 97.9 SEC (ref 23–36.4)
BASOPHILS # BLD: 0 K/UL (ref 0–0.1)
BASOPHILS NFR BLD: 0 % (ref 0–2)
BUN SERPL-MCNC: 22 MG/DL (ref 7–18)
BUN/CREAT SERPL: 21 (ref 12–20)
CALCIUM SERPL-MCNC: 8.5 MG/DL (ref 8.5–10.1)
CHLORIDE SERPL-SCNC: 105 MMOL/L (ref 100–111)
CO2 SERPL-SCNC: 32 MMOL/L (ref 21–32)
CREAT SERPL-MCNC: 1.03 MG/DL (ref 0.6–1.3)
DIFFERENTIAL METHOD BLD: ABNORMAL
EOSINOPHIL # BLD: 0.1 K/UL (ref 0–0.4)
EOSINOPHIL NFR BLD: 1 % (ref 0–5)
ERYTHROCYTE [DISTWIDTH] IN BLOOD BY AUTOMATED COUNT: 15.6 % (ref 11.6–14.5)
GLUCOSE BLD STRIP.AUTO-MCNC: 161 MG/DL (ref 70–110)
GLUCOSE BLD STRIP.AUTO-MCNC: 182 MG/DL (ref 70–110)
GLUCOSE BLD STRIP.AUTO-MCNC: 254 MG/DL (ref 70–110)
GLUCOSE SERPL-MCNC: 123 MG/DL (ref 74–99)
HCT VFR BLD AUTO: 31.9 % (ref 35–45)
HGB BLD-MCNC: 9.7 G/DL (ref 12–16)
IMM GRANULOCYTES # BLD AUTO: 0.1 K/UL (ref 0–0.04)
IMM GRANULOCYTES NFR BLD AUTO: 1 % (ref 0–0.5)
LYMPHOCYTES # BLD: 1.9 K/UL (ref 0.9–3.6)
LYMPHOCYTES NFR BLD: 12 % (ref 21–52)
MAGNESIUM SERPL-MCNC: 1.9 MG/DL (ref 1.6–2.6)
MCH RBC QN AUTO: 28.1 PG (ref 24–34)
MCHC RBC AUTO-ENTMCNC: 30.4 G/DL (ref 31–37)
MCV RBC AUTO: 92.5 FL (ref 78–100)
MONOCYTES # BLD: 1.1 K/UL (ref 0.05–1.2)
MONOCYTES NFR BLD: 7 % (ref 3–10)
NEUTS SEG # BLD: 13.3 K/UL (ref 1.8–8)
NEUTS SEG NFR BLD: 80 % (ref 40–73)
NRBC # BLD: 0 K/UL (ref 0–0.01)
NRBC BLD-RTO: 0 PER 100 WBC
PHOSPHATE SERPL-MCNC: 2.4 MG/DL (ref 2.5–4.9)
PLATELET # BLD AUTO: 326 K/UL (ref 135–420)
PMV BLD AUTO: 10.4 FL (ref 9.2–11.8)
POTASSIUM SERPL-SCNC: 4.6 MMOL/L (ref 3.5–5.5)
RBC # BLD AUTO: 3.45 M/UL (ref 4.2–5.3)
SODIUM SERPL-SCNC: 141 MMOL/L (ref 136–145)
WBC # BLD AUTO: 16.6 K/UL (ref 4.6–13.2)

## 2024-08-16 PROCEDURE — 2140000001 HC CVICU INTERMEDIATE R&B

## 2024-08-16 PROCEDURE — 92526 ORAL FUNCTION THERAPY: CPT

## 2024-08-16 PROCEDURE — 6370000000 HC RX 637 (ALT 250 FOR IP): Performed by: PHYSICIAN ASSISTANT

## 2024-08-16 PROCEDURE — 94761 N-INVAS EAR/PLS OXIMETRY MLT: CPT

## 2024-08-16 PROCEDURE — 6360000002 HC RX W HCPCS: Performed by: NURSE PRACTITIONER

## 2024-08-16 PROCEDURE — 99232 SBSQ HOSP IP/OBS MODERATE 35: CPT | Performed by: STUDENT IN AN ORGANIZED HEALTH CARE EDUCATION/TRAINING PROGRAM

## 2024-08-16 PROCEDURE — 6370000000 HC RX 637 (ALT 250 FOR IP): Performed by: STUDENT IN AN ORGANIZED HEALTH CARE EDUCATION/TRAINING PROGRAM

## 2024-08-16 PROCEDURE — 6370000000 HC RX 637 (ALT 250 FOR IP)

## 2024-08-16 PROCEDURE — 85025 COMPLETE CBC W/AUTO DIFF WBC: CPT

## 2024-08-16 PROCEDURE — 2700000000 HC OXYGEN THERAPY PER DAY

## 2024-08-16 PROCEDURE — 97110 THERAPEUTIC EXERCISES: CPT

## 2024-08-16 PROCEDURE — 82962 GLUCOSE BLOOD TEST: CPT

## 2024-08-16 PROCEDURE — 85730 THROMBOPLASTIN TIME PARTIAL: CPT

## 2024-08-16 PROCEDURE — 6370000000 HC RX 637 (ALT 250 FOR IP): Performed by: NURSE PRACTITIONER

## 2024-08-16 PROCEDURE — 94660 CPAP INITIATION&MGMT: CPT

## 2024-08-16 PROCEDURE — 83735 ASSAY OF MAGNESIUM: CPT

## 2024-08-16 PROCEDURE — 84100 ASSAY OF PHOSPHORUS: CPT

## 2024-08-16 PROCEDURE — 80048 BASIC METABOLIC PNL TOTAL CA: CPT

## 2024-08-16 PROCEDURE — 2580000003 HC RX 258: Performed by: INTERNAL MEDICINE

## 2024-08-16 PROCEDURE — 36415 COLL VENOUS BLD VENIPUNCTURE: CPT

## 2024-08-16 PROCEDURE — 97535 SELF CARE MNGMENT TRAINING: CPT

## 2024-08-16 RX ADMIN — THIAMINE HYDROCHLORIDE 200 MG: 100 INJECTION, SOLUTION INTRAMUSCULAR; INTRAVENOUS at 10:05

## 2024-08-16 RX ADMIN — APIXABAN 5 MG: 5 TABLET, FILM COATED ORAL at 13:47

## 2024-08-16 RX ADMIN — THIAMINE HYDROCHLORIDE 200 MG: 100 INJECTION, SOLUTION INTRAMUSCULAR; INTRAVENOUS at 22:35

## 2024-08-16 RX ADMIN — PREDNISONE 40 MG: 20 TABLET ORAL at 10:05

## 2024-08-16 RX ADMIN — SODIUM CHLORIDE, PRESERVATIVE FREE 10 ML: 5 INJECTION INTRAVENOUS at 10:09

## 2024-08-16 RX ADMIN — SODIUM CHLORIDE, PRESERVATIVE FREE 10 ML: 5 INJECTION INTRAVENOUS at 22:36

## 2024-08-16 RX ADMIN — Medication 1 CAPSULE: at 10:05

## 2024-08-16 RX ADMIN — POLYETHYLENE GLYCOL 3350 34 G: 17 POWDER, FOR SOLUTION ORAL at 10:06

## 2024-08-16 RX ADMIN — FOLIC ACID 1 MG: 1 TABLET ORAL at 10:05

## 2024-08-16 RX ADMIN — APIXABAN 5 MG: 5 TABLET, FILM COATED ORAL at 21:12

## 2024-08-16 RX ADMIN — THIAMINE HYDROCHLORIDE 200 MG: 100 INJECTION, SOLUTION INTRAMUSCULAR; INTRAVENOUS at 16:18

## 2024-08-16 RX ADMIN — POLYETHYLENE GLYCOL 3350 34 G: 17 POWDER, FOR SOLUTION ORAL at 21:12

## 2024-08-16 RX ADMIN — INSULIN LISPRO 8 UNITS: 100 INJECTION, SOLUTION INTRAVENOUS; SUBCUTANEOUS at 16:51

## 2024-08-16 ASSESSMENT — PAIN SCALES - GENERAL
PAINLEVEL_OUTOF10: 0

## 2024-08-16 NOTE — PROGRESS NOTES
[facility] Hospitalist [facility]  Progress Note  Date:2024       Room:[facility]  Patient Name:Jasmin Pacheco     YOB: 1945     Age:78 y.o.        Subjective    Subjective   Review of Systems    Patient on HFNC. Patient wants to be repositioned. Also the high flow pressure is too much.    Disposition: Patient is still intermittently on high flow nasal cannula versus BiPAP.  Once oxygen requirement is weaned down.  Patient would likely be stable for discharge to SNF on     Objective         Vitals Last 24 Hours:  TEMPERATURE:  Temp  Av.7 °F (36.5 °C)  Min: 97.5 °F (36.4 °C)  Max: 97.9 °F (36.6 °C)  RESPIRATIONS RANGE: Resp  Av.5  Min: 14  Max: 26  PULSE OXIMETRY RANGE: SpO2  Av %  Min: 92 %  Max: 100 %  PULSE RANGE: Pulse  Av.3  Min: 56  Max: 74  BLOOD PRESSURE RANGE: Systolic (24hrs), Av , Min:119 , Max:151     ; Diastolic (24hrs), Av, Min:60, Max:88      I/O (24Hr):    Intake/Output Summary (Last 24 hours) at 2024 1250  Last data filed at 2024 1201  Gross per 24 hour   Intake 1035.01 ml   Output 800 ml   Net 235.01 ml     Objective:  General Appearance:  Comfortable.    Vital signs: (most recent): Blood pressure 126/64, pulse 67, temperature 97.8 °F (36.6 °C), temperature source Axillary, resp. rate 25, height 1.702 m (5' 7\"), weight (!) 140 kg (308 lb 10.3 oz), SpO2 100%.    Output: Producing urine.    HEENT: Normal HEENT exam.  (Bipap  )    Lungs:  Normal effort and normal respiratory rate.  Breath sounds clear to auscultation.    Heart: Normal rate.  Regular rhythm.    Abdomen: Abdomen is soft and flat.  Bowel sounds are normal.   There is no abdominal tenderness.     Extremities: Normal range of motion.    Pulses: Distal pulses are intact.    Neurological: Patient is alert and oriented to person, place and time.    Skin:  Warm and dry.          Labs/Imaging/Diagnostics    Labs:  CBC:  Recent Labs     24  0130 08/15/24  0459  pneumonia  #Metabolic encephalopathy, resolved  #Acute on chronic diastolic heart failure  #LEA on CKD stage IIIb  #Morbid obesity.  BMI 40.9 to  #Type 2 diabetes  #PE.  VQ scan shows high probability of PE on 8/4).     Plan:   Administer medications as ordered.   (    - Wean off oxygen as tolerated  - Continue p.o. steroids.End date 8/17  - Status post course of Zosyn.  Repeat procalcitonin is negative  - Discontinue heparin. Start eliquis on 8/16  - Due to loose bowels, ordered FMS and lactobacillus  - Due to nighttime hypoglycemia, hold glargine on 8/13  - N.p.o. while on BiPAP).       Case discussed with:  [x]Patient  []Family  [x]Nursing  []Case Management  DVT Prophylaxis:  []Lovenox  []Hep SQ  []SCDs  []Coumadin   []Heparin gtt   []Xarelto   [x]Eliquis    Rc Rangel DO, LEATHA, MS  [facility]  Hospitalist

## 2024-08-16 NOTE — PROGRESS NOTES
Patient has been on BIPAP all day and HFNC during rest her face. No respiratory distress noted. Plan is to monitor and continue current respiratory care.

## 2024-08-16 NOTE — PROGRESS NOTES
Physician Progress Note      PATIENT:               SAMEER MCALLISTER  CSN #:                  [id number]  :                       1945  ADMIT DATE:       2024 4:44 PM  DISCH DATE:  RESPONDING  PROVIDER #:        Haris Smart DO          QUERY TEXT:    Good Morning    This patient admitted for Acute Hypoxic Respiratory failure due to COVID 19   Pneumonia, and now requiring intubation mechanical ventilation.    If possible, please document in progress notes and discharge summary if you   are evaluating and/or treating:    The medical record reflects the following:  Risk Factors: DM, Moribd obesity, COVID Viral infection  Clinical Indicators: Transferred from OSH for worsening respiraotry failure,   requiring vent. WBC on  noted @ 17.7---Hr in the 90s---documented temp on   2024- @  @ 101.1, 100.6 (axillary)-lactic acid @ 3.9  CXR on   --Persistent right base patchy opacities with obscuration of the right   hemidiaphragm. Right hilar opacity suggest atelectasis or consolidation.  Treatment: ICU level care, [facility] ventilation, Droplet plus isolation, lactic   acid, Blood cx on -Covid PCR assessed on 2024- IVF    Thank you  FLOR CarrollN,RN, CPHQ, CCDS, SMART  Options provided:  -- Yes, Severe Sepsis was present at the time of the order to admit to the   hospital  -- No, Severe Sepsis was not present on admission and developed during the   inpatient stay  -- Other - I will add my own diagnosis  -- Disagree - Not applicable / Not valid  -- Disagree - Clinically unable to determine / Unknown  -- Refer to Clinical Documentation Reviewer    PROVIDER RESPONSE TEXT:    Yes, Severe Sepsis was present at the time of the order to admit to the   hospital.    Query created by: January Bob on 2024 12:57 PM      Electronically signed by:  Haris Smart DO 8/15/2024 11:18 PM

## 2024-08-16 NOTE — PLAN OF CARE
Problem: Occupational Therapy - Adult  Goal: By Discharge: Performs self-care activities at highest level of function for planned discharge setting.  See evaluation for individualized goals.  Description: Occupational Therapy Goals:  Initiated 8/12/2024 to be met within 7-10 days.    1.  Patient will perform bed mobility in preparation for ADLs with maximal assistance x 1.   2.  Patient will perform grooming with supervision/set-up.  3.  Patient will perform self-feeding with modified independence following set-up.  4.  Patient will perform upper body dressing with minimal assistance/contact guard assist.  5.  Patient will perform functional activity sitting EOB for approx. 2 minutes with supervision/set-up, F+ balance in preparation for progression to standing goals.  6.  Patient will participate in upper extremity therapeutic exercise/activities with supervision/set-up for 8-10 minutes to increase strength/endurance for ADLs.    7.  Patient will utilize energy conservation techniques during functional activities with verbal cues.    PLOF: Pt reports she needed assist with self-care and used a rollator for functional mobility.  Outcome: Progressing      OCCUPATIONAL THERAPY TREATMENT    Patient: Jasmin Pacheco (78 y.o. female)  Date: 8/16/2024  Diagnosis: Acute respiratory failure with hypoxia and hypercarbia (HCC) [J96.01, J96.02] Acute respiratory failure with hypoxia and hypercarbia (HCC)  Precautions: Contact Precautions, Isolation (droplet +)      Chart, occupational therapy assessment, plan of care, and goals were reviewed.  ASSESSMENT:  Spoke with RT prior to entering room regarding BiPAP to HFNC. RT reports he just exited room with pt requesting to remain on BiPAP at this time despite her being able to change to HFNC. Upon entering room, patient supine in bed with wedged on right, alert, and agreeable to participate, requesting to be repositioned. Pt found soiled from puriwick leakage and was min assist for  Aching  Intervention(s): Nurse notified, Repositioning , and Rest    Activity Tolerance:   Activity Tolerance: Patient limited by fatigue  Please refer to the flowsheet for vital signs taken during this treatment.    After treatment:   [] Patient left in no apparent distress sitting up in chair  [] Patient left in no apparent distress in bed  [x] Call bell left within reach  [x] Nursing notified  [] Caregiver present  [] Bed alarm activated    COMMUNICATION/EDUCATION:   Patient Education  Education Given To: Patient  Education Provided: Role of Therapy;Plan of Care;Energy Conservation;Fall Prevention Strategies  Education Method: Demonstration;Verbal;Teach Back  Barriers to Learning: None  Education Outcome: Verbalized understanding;Continued education needed    Thank you for this referral.  Jonatan Sharma OTR/L  Minutes: 13    Eval Complexity: Decision Making: Medium Complexity

## 2024-08-16 NOTE — PLAN OF CARE
Problem: SLP Adult - Impaired Swallowing  Goal: By Discharge: Advance to least restrictive diet without signs or symptoms of aspiration for planned discharge setting.  See evaluation for individualized goals.  Description: Patient will:  1. Tolerate PO trials with 0 s/s overt distress in 4/5 trials  2. Utilize compensatory swallow strategies/maneuvers (decrease bite/sip, size/rate, alt. liq/sol) with min cues in 4/5 trials  3. Perform oral-motor/laryngeal exercises to increase oropharyngeal swallow function with min cues  4. Complete an objective swallow study (i.e., MBSS) to assess swallow integrity, r/o aspiration, and determine of safest LRD, min A as indicated/ordered by MD     Rec:     Easy to chew diet with thin liquids  Aspiration precautions  HOB >45 during po intake, remain >30 for 30-45 minutes after po   Small bites/sips; alternate liquid/solid with slow feeding rate   Oral care TID  Meds per pt preference    Outcome: Progressing   SPEECH LANGUAGE PATHOLOGY DYSPHAGIA TREATMENT    Patient: Jasmin Pacheco (78 y.o. female)  Date: 8/16/2024  Diagnosis: Acute respiratory failure with hypoxia and hypercarbia (HCC) [J96.01, J96.02] Acute respiratory failure with hypoxia and hypercarbia (HCC)      Precautions: Standard, aspiration  PLOF: As per H&P      ASSESSMENT:  Pt seen at bedside for follow up dysphagia management. Pt on BiPap for comfort while sleeping upon SLP arrival, RN switched her to HFNC 30L 40% FiO2. Pt tolerated easy to chew solids, nectar thick liquids +/- straw consecutive swallows, and thin liquids + straw consecutive swallows without any overt s/sx of aspiration across all PO trials. Strength, ROM, and coordination of orofacial musculature found to be WNL. Mastication was appropriate with timely a-p transit and positive oral clearance with all PO trials. Recommend easy to chew solids and thin liquids, aspiration precautions, oral care TID, and meds as tolerated. Will continue to follow for  Place, Time, and Situation    Dysphagia Treatment:  Oral Assessment:  Oral Motor   Labial: No impairment  Dentition: Natural, Limited  Oral Hygiene: Moist, Clean  Lingual: No impairment  Velum: Unable to visualize  Mandible: No impairment        P.O. Trials:   PO Trials  Neuromuscular Estim Used: No  Assessment Method(s): Observation  Patient Position: 70 HOB  Vocal Quality: No Impairment  Consistency Presented: Easy to chew, Thin, Mildly Thick  How Presented: Self-fed/presented, Cup/sip, Spoon, Straw, Successive Swallows  Bolus Acceptance: No impairment  Bolus Formation/Control: No impairment  Type of Impairment: Delayed, Mastication  Propulsion: No impairment  Oral Residue: Less than 10% of bolus  Initiation of Swallow: No impairment  Laryngeal Elevation: Functional  Aspiration Signs/Symptoms: None  Pharyngeal Phase Characteristics: No impairment, issues, or problems  Effective Modifications: Alternate liquids/solids, Small sips and bites  Cues for Modifications: None    DYSPHAGIA DIAGNOSIS: Dysphagia Diagnosis: Mild pharyngeal stage dysphagia    PAIN:  Start of Tx: 0  End of Tx: 0     After treatment:   []              Patient left in no apparent distress sitting up in chair  [x]              Patient left in no apparent distress in bed  [x]              Call bell left within reach  [x]              Nursing notified  []              Family present  []              Caregiver present  []              Bed alarm activated      COMMUNICATION/EDUCATION:   [x]            Aspiration precautions; swallow safety; compensatory techniques provided via demonstration, verbalization and teach back of comprehension  [x]         Patient/family have participated as able in goal setting and plan of care.  []            Patient/family agree to work toward stated goals and plan of care.  []            Patient understands intent and goals of therapy, neutral about participation.  []            Patient unable to participate in goal

## 2024-08-16 NOTE — PLAN OF CARE
Problem: Physical Therapy - Adult  Goal: By Discharge: Performs mobility at highest level of function for planned discharge setting.  See evaluation for individualized goals.  Description: Physical Therapy Goals  Initiated 8/12/2024 and to be accomplished within 7 day(s)  1.  Patient will move from supine to sit and sit to supine  in bed with minimal assistance/contact guard assist.    2.  Patient will transfer from bed to chair and chair to bed with minimal assistance/contact guard assist using the least restrictive device.  3.  Patient will perform sit to stand with minimal assistance/contact guard assist.  4.  Patient will ambulate with minimal assistance/contact guard assist for 50 feet with the least restrictive device.   5.  Patient will maintain seated EOB 8 minutes with supervision.    PLOF: Lives with sons. One story home with 2 steps and ramp entry. Amb with rollator. Sleeps in recliner.   Outcome: Progressing   PHYSICAL THERAPY TREATMENT    Patient: Jasmin Pacheco (78 y.o. female)  Date: 8/16/2024  Diagnosis: Acute respiratory failure with hypoxia and hypercarbia (HCC) [J96.01, J96.02] Acute respiratory failure with hypoxia and hypercarbia (HCC)  Precautions: Isolation(droplet plus), Aspiration Risk  ASSESSMENT:  Droplet plus isolation. On BiPAP. Noted to be soiled of urine. Min A for rolling to left, mod A for rolling to right. Verbal cues for mobility. Additional time for mobility. Seated in bed with HOB elevated. Educated on need for RN assistance with mobility; verbalized understanding. Call bell in reach.     Progression toward goals:   []      Improving appropriately and progressing toward goals  [x]      Improving slowly and progressing toward goals  []      Not making progress toward goals and plan of care will be adjusted     PLAN:  Patient continues to benefit from skilled intervention to address the above impairments.  Continue treatment per established plan of care.    Further Equipment

## 2024-08-16 NOTE — PLAN OF CARE
Problem: Safety - Adult  Goal: Free from fall injury  8/15/2024 2035 by Brissa Byers RN  Outcome: Progressing  8/15/2024 1946 by Gini Caldwell RN  Outcome: Progressing     Problem: Chronic Conditions and Co-morbidities  Goal: Patient's chronic conditions and co-morbidity symptoms are monitored and maintained or improved  8/15/2024 2035 by Brissa Byers RN  Outcome: Progressing  8/15/2024 1946 by Gini Caldwell RN  Outcome: Progressing     Problem: Discharge Planning  Goal: Discharge to home or other facility with appropriate resources  8/15/2024 2035 by Brissa Byers RN  Outcome: Progressing  8/15/2024 1946 by Gini Caldwell RN  Outcome: Progressing     Problem: Pain  Goal: Verbalizes/displays adequate comfort level or baseline comfort level  8/15/2024 2035 by Brissa Byers RN  Outcome: Progressing  8/15/2024 1946 by Gini Caldwell RN  Outcome: Progressing     Problem: Safety - Medical Restraint  Goal: Remains free of injury from restraints (Restraint for Interference with Medical Device)  Description: INTERVENTIONS:  1. Determine that other, less restrictive measures have been tried or would not be effective before applying the restraint  2. Evaluate the patient's condition at the time of restraint application  3. Inform patient/family regarding the reason for restraint  4. Q2H: Monitor safety, psychosocial status, comfort, nutrition and hydration  8/15/2024 2035 by Brissa Byers RN  Outcome: Progressing  8/15/2024 1946 by Gini Caldwell RN  Outcome: Progressing     Problem: ABCDS Injury Assessment  Goal: Absence of physical injury  8/15/2024 2035 by Brissa Byers RN  Outcome: Progressing  8/15/2024 1946 by Gini Caldwell RN  Outcome: Progressing     Problem: Nutrition Deficit:  Goal: Optimize nutritional status  8/15/2024 2035 by Brissa Byers RN  Outcome: Progressing  8/15/2024 1946 by Gini Caldwell RN  Outcome:

## 2024-08-16 NOTE — PLAN OF CARE
Problem: Safety - Adult  Goal: Free from fall injury  Outcome: Progressing     Problem: Chronic Conditions and Co-morbidities  Goal: Patient's chronic conditions and co-morbidity symptoms are monitored and maintained or improved  Outcome: Progressing  Flowsheets (Taken 8/16/2024 0800)  Care Plan - Patient's Chronic Conditions and Co-Morbidity Symptoms are Monitored and Maintained or Improved:   Monitor and assess patient's chronic conditions and comorbid symptoms for stability, deterioration, or improvement   Collaborate with multidisciplinary team to address chronic and comorbid conditions and prevent exacerbation or deterioration   Update acute care plan with appropriate goals if chronic or comorbid symptoms are exacerbated and prevent overall improvement and discharge     Problem: Discharge Planning  Goal: Discharge to home or other facility with appropriate resources  Outcome: Progressing  Flowsheets (Taken 8/16/2024 0800)  Discharge to home or other facility with appropriate resources:   Identify barriers to discharge with patient and caregiver   Identify discharge learning needs (meds, wound care, etc)   Refer to discharge planning if patient needs post-hospital services based on physician order or complex needs related to functional status, cognitive ability or social support system     Problem: Pain  Goal: Verbalizes/displays adequate comfort level or baseline comfort level  Outcome: Progressing  Flowsheets (Taken 8/16/2024 0800)  Verbalizes/displays adequate comfort level or baseline comfort level:   Encourage patient to monitor pain and request assistance   Assess pain using appropriate pain scale   Consider cultural and social influences on pain and pain management   Notify Licensed Independent Practitioner if interventions unsuccessful or patient reports new pain     Problem: ABCDS Injury Assessment  Goal: Absence of physical injury  Outcome: Progressing     Problem: Nutrition Deficit:  Goal: Optimize  nutritional status  Outcome: Progressing     Problem: Skin/Tissue Integrity  Goal: Absence of new skin breakdown  Description: 1.  Monitor for areas of redness and/or skin breakdown  2.  Assess vascular access sites hourly  3.  Every 4-6 hours minimum:  Change oxygen saturation probe site  4.  Every 4-6 hours:  If on nasal continuous positive airway pressure, respiratory therapy assess nares and determine need for appliance change or resting period.  Outcome: Progressing     Problem: SLP Adult - Impaired Swallowing  Goal: By Discharge: Advance to least restrictive diet without signs or symptoms of aspiration for planned discharge setting.  See evaluation for individualized goals.  Description: Patient will:  1. Tolerate PO trials with 0 s/s overt distress in 4/5 trials  2. Utilize compensatory swallow strategies/maneuvers (decrease bite/sip, size/rate, alt. liq/sol) with min cues in 4/5 trials  3. Perform oral-motor/laryngeal exercises to increase oropharyngeal swallow function with min cues  4. Complete an objective swallow study (i.e., MBSS) to assess swallow integrity, r/o aspiration, and determine of safest LRD, min A as indicated/ordered by MD     Rec:     Easy to chew diet with thin liquids  Aspiration precautions  HOB >45 during po intake, remain >30 for 30-45 minutes after po   Small bites/sips; alternate liquid/solid with slow feeding rate   Oral care TID  Meds per pt preference      8/16/2024 1032 by Janine Farr, SLP  Outcome: Progressing     Problem: Occupational Therapy - Adult  Goal: By Discharge: Performs self-care activities at highest level of function for planned discharge setting.  See evaluation for individualized goals.  Description: Occupational Therapy Goals:  Initiated 8/12/2024 to be met within 7-10 days.    1.  Patient will perform bed mobility in preparation for ADLs with maximal assistance x 1.   2.  Patient will perform grooming with supervision/set-up.  3.  Patient will perform

## 2024-08-17 ENCOUNTER — APPOINTMENT (OUTPATIENT)
Facility: HOSPITAL | Age: 79
DRG: 870 | End: 2024-08-17
Attending: INTERNAL MEDICINE
Payer: MEDICARE

## 2024-08-17 LAB
ANION GAP SERPL CALC-SCNC: 4 MMOL/L (ref 3–18)
BASOPHILS # BLD: 0 K/UL (ref 0–0.1)
BASOPHILS NFR BLD: 0 % (ref 0–2)
BUN SERPL-MCNC: 23 MG/DL (ref 7–18)
BUN/CREAT SERPL: 23 (ref 12–20)
CALCIUM SERPL-MCNC: 8.6 MG/DL (ref 8.5–10.1)
CHLORIDE SERPL-SCNC: 104 MMOL/L (ref 100–111)
CO2 SERPL-SCNC: 30 MMOL/L (ref 21–32)
CREAT SERPL-MCNC: 1.02 MG/DL (ref 0.6–1.3)
DIFFERENTIAL METHOD BLD: ABNORMAL
EOSINOPHIL # BLD: 0.1 K/UL (ref 0–0.4)
EOSINOPHIL NFR BLD: 1 % (ref 0–5)
ERYTHROCYTE [DISTWIDTH] IN BLOOD BY AUTOMATED COUNT: 15.5 % (ref 11.6–14.5)
GLUCOSE BLD STRIP.AUTO-MCNC: 132 MG/DL (ref 70–110)
GLUCOSE BLD STRIP.AUTO-MCNC: 176 MG/DL (ref 70–110)
GLUCOSE BLD STRIP.AUTO-MCNC: 222 MG/DL (ref 70–110)
GLUCOSE BLD STRIP.AUTO-MCNC: 229 MG/DL (ref 70–110)
GLUCOSE SERPL-MCNC: 135 MG/DL (ref 74–99)
HCT VFR BLD AUTO: 29.2 % (ref 35–45)
HGB BLD-MCNC: 9.2 G/DL (ref 12–16)
IMM GRANULOCYTES # BLD AUTO: 0.1 K/UL (ref 0–0.04)
IMM GRANULOCYTES NFR BLD AUTO: 1 % (ref 0–0.5)
LYMPHOCYTES # BLD: 1.8 K/UL (ref 0.9–3.6)
LYMPHOCYTES NFR BLD: 11 % (ref 21–52)
MAGNESIUM SERPL-MCNC: 2 MG/DL (ref 1.6–2.6)
MCH RBC QN AUTO: 29.1 PG (ref 24–34)
MCHC RBC AUTO-ENTMCNC: 31.5 G/DL (ref 31–37)
MCV RBC AUTO: 92.4 FL (ref 78–100)
MONOCYTES # BLD: 1.1 K/UL (ref 0.05–1.2)
MONOCYTES NFR BLD: 7 % (ref 3–10)
NEUTS SEG # BLD: 12.8 K/UL (ref 1.8–8)
NEUTS SEG NFR BLD: 80 % (ref 40–73)
NRBC # BLD: 0 K/UL (ref 0–0.01)
NRBC BLD-RTO: 0 PER 100 WBC
PHOSPHATE SERPL-MCNC: 2.7 MG/DL (ref 2.5–4.9)
PLATELET # BLD AUTO: 356 K/UL (ref 135–420)
PMV BLD AUTO: 10.8 FL (ref 9.2–11.8)
POTASSIUM SERPL-SCNC: 4.6 MMOL/L (ref 3.5–5.5)
RBC # BLD AUTO: 3.16 M/UL (ref 4.2–5.3)
SODIUM SERPL-SCNC: 138 MMOL/L (ref 136–145)
WBC # BLD AUTO: 15.9 K/UL (ref 4.6–13.2)

## 2024-08-17 PROCEDURE — 99232 SBSQ HOSP IP/OBS MODERATE 35: CPT | Performed by: INTERNAL MEDICINE

## 2024-08-17 PROCEDURE — 6360000002 HC RX W HCPCS: Performed by: NURSE PRACTITIONER

## 2024-08-17 PROCEDURE — 6370000000 HC RX 637 (ALT 250 FOR IP): Performed by: STUDENT IN AN ORGANIZED HEALTH CARE EDUCATION/TRAINING PROGRAM

## 2024-08-17 PROCEDURE — 6370000000 HC RX 637 (ALT 250 FOR IP)

## 2024-08-17 PROCEDURE — 80048 BASIC METABOLIC PNL TOTAL CA: CPT

## 2024-08-17 PROCEDURE — 6370000000 HC RX 637 (ALT 250 FOR IP): Performed by: NURSE PRACTITIONER

## 2024-08-17 PROCEDURE — 36415 COLL VENOUS BLD VENIPUNCTURE: CPT

## 2024-08-17 PROCEDURE — 6370000000 HC RX 637 (ALT 250 FOR IP): Performed by: INTERNAL MEDICINE

## 2024-08-17 PROCEDURE — 6370000000 HC RX 637 (ALT 250 FOR IP): Performed by: PHYSICIAN ASSISTANT

## 2024-08-17 PROCEDURE — 85025 COMPLETE CBC W/AUTO DIFF WBC: CPT

## 2024-08-17 PROCEDURE — 94660 CPAP INITIATION&MGMT: CPT

## 2024-08-17 PROCEDURE — 2700000000 HC OXYGEN THERAPY PER DAY

## 2024-08-17 PROCEDURE — 71045 X-RAY EXAM CHEST 1 VIEW: CPT

## 2024-08-17 PROCEDURE — 83735 ASSAY OF MAGNESIUM: CPT

## 2024-08-17 PROCEDURE — 94761 N-INVAS EAR/PLS OXIMETRY MLT: CPT

## 2024-08-17 PROCEDURE — 2140000001 HC CVICU INTERMEDIATE R&B

## 2024-08-17 PROCEDURE — 2580000003 HC RX 258: Performed by: INTERNAL MEDICINE

## 2024-08-17 PROCEDURE — 84100 ASSAY OF PHOSPHORUS: CPT

## 2024-08-17 PROCEDURE — 82962 GLUCOSE BLOOD TEST: CPT

## 2024-08-17 RX ORDER — AMLODIPINE BESYLATE 5 MG/1
2.5 TABLET ORAL DAILY
Status: DISCONTINUED | OUTPATIENT
Start: 2024-08-17 | End: 2024-08-18

## 2024-08-17 RX ORDER — FUROSEMIDE 40 MG
40 TABLET ORAL DAILY
Status: DISCONTINUED | OUTPATIENT
Start: 2024-08-17 | End: 2024-08-24 | Stop reason: HOSPADM

## 2024-08-17 RX ORDER — ALBUTEROL SULFATE 0.83 MG/ML
2.5 SOLUTION RESPIRATORY (INHALATION)
Status: DISCONTINUED | OUTPATIENT
Start: 2024-08-17 | End: 2024-08-18

## 2024-08-17 RX ORDER — INSULIN GLARGINE 100 [IU]/ML
7 INJECTION, SOLUTION SUBCUTANEOUS DAILY
Status: DISCONTINUED | OUTPATIENT
Start: 2024-08-18 | End: 2024-08-24 | Stop reason: HOSPADM

## 2024-08-17 RX ADMIN — AMLODIPINE BESYLATE 2.5 MG: 5 TABLET ORAL at 14:27

## 2024-08-17 RX ADMIN — POLYETHYLENE GLYCOL 3350 34 G: 17 POWDER, FOR SOLUTION ORAL at 21:46

## 2024-08-17 RX ADMIN — PREDNISONE 40 MG: 20 TABLET ORAL at 08:44

## 2024-08-17 RX ADMIN — Medication 1 CAPSULE: at 08:44

## 2024-08-17 RX ADMIN — THIAMINE HYDROCHLORIDE 200 MG: 100 INJECTION, SOLUTION INTRAMUSCULAR; INTRAVENOUS at 14:25

## 2024-08-17 RX ADMIN — FOLIC ACID 1 MG: 1 TABLET ORAL at 08:44

## 2024-08-17 RX ADMIN — APIXABAN 5 MG: 5 TABLET, FILM COATED ORAL at 21:45

## 2024-08-17 RX ADMIN — THIAMINE HYDROCHLORIDE 200 MG: 100 INJECTION, SOLUTION INTRAMUSCULAR; INTRAVENOUS at 21:45

## 2024-08-17 RX ADMIN — APIXABAN 5 MG: 5 TABLET, FILM COATED ORAL at 08:44

## 2024-08-17 RX ADMIN — FUROSEMIDE 40 MG: 40 TABLET ORAL at 14:25

## 2024-08-17 RX ADMIN — THIAMINE HYDROCHLORIDE 200 MG: 100 INJECTION, SOLUTION INTRAMUSCULAR; INTRAVENOUS at 08:45

## 2024-08-17 RX ADMIN — INSULIN LISPRO 4 UNITS: 100 INJECTION, SOLUTION INTRAVENOUS; SUBCUTANEOUS at 17:25

## 2024-08-17 RX ADMIN — INSULIN LISPRO 4 UNITS: 100 INJECTION, SOLUTION INTRAVENOUS; SUBCUTANEOUS at 11:36

## 2024-08-17 RX ADMIN — SODIUM CHLORIDE, PRESERVATIVE FREE 10 ML: 5 INJECTION INTRAVENOUS at 21:51

## 2024-08-17 ASSESSMENT — PAIN SCALES - GENERAL
PAINLEVEL_OUTOF10: 0

## 2024-08-17 NOTE — PROGRESS NOTES
[facility]  Pulmonary, Critical Care, and Sleep Medicine    Name: Jasmin Pacheco MRN: [id number]   : 1945 Hospital: [facility]   Date: 2024        IMPRESSION:   Acute hypoxic and hypercapnic respiratory failure requiring intubation due to acute heart failure and likely mild ARDS - s/p extubation - on HFNC & BiPAP.  COVID pneumonia: dx 24.  Severe sepsis due to above, POA, possible bacterial infection superimposed on COVID-pneumonia; resolved  Metabolic encephalopathy - likely toxic/metabolic; resolved  Acute on chronic diastolic heart failure (ECHO 24), grade 2 diastolic dysfunction with an ejection fraction of 55 to 60%, mod pHTN  Contraction alkalosis due to diuresis - resolved  Hypoglycemia - resolved  LEA on CKD 3B  Morbid obesity, Body mass index is 48.92 kg/m².  T2DM   Hx PE - on [employer] outpatient w/ VQ scan \"high probability\" of PE 24     Patient Active Problem List   Diagnosis    Acute respiratory failure (HCC)    Acute on chronic diastolic heart failure (HCC)    Hyperlipidemia    Type 2 diabetes mellitus with obesity (HCC)    Acute cystitis    Shortness of breath    COPD exacerbation (HCC)    Acute on chronic respiratory failure with hypoxia and hypercapnia (HCC)    Acute respiratory failure with hypoxia and hypercarbia (HCC)    Congestive heart failure (HCC)    Pulmonary embolism (HCC)    COVID    ARDS (adult respiratory distress syndrome) (HCC)      PLAN:   Supplemental oxygen to maintain SpO2 >88%; avoid hyperoxygenation - on HFNC 35% FiO2. BIPAP 16/8; 35% FIO2 with sleep.  Rpt CXR ordered  Will resume home diuretic regimen - Lasix 40 mg BID - suspect hypervolemia likely etiology of persistent hypoxia / need for supplemental O2  S/p Prednisone x5 day course  Abx - defer to primary service  Bronchodilators - prn  Please assess for home oxygen need prior to discharge  Aggressive pulmonary toileting/bronchial hygiene  Frequent incentive  Medicine  [facility]

## 2024-08-17 NOTE — PROGRESS NOTES
[facility] Hospitalist Group  Progress Note  Date:8/17/2024       Room:[facility] [place][note 2]  Patient Name:Jasmin Pacheco     YOB: 1945     Age:78 y.o.        Subjective      Patient is currently on BiPAP.  Dyspnea on exertion present.  Denies any chest or abdominal pain.  No nausea or vomiting.    Objective           BP (!) 172/74   Pulse 91   Temp 98.7 °F (37.1 °C) (Axillary)   Resp 19   Ht 1.702 m (5' 7\")   Wt (!) 140 kg (308 lb 10.3 oz)   SpO2 98%   BMI 48.34 kg/m²       General appearance - alert, well appearing, BiPAP mask is in situ, and in no distress  Chest -diminished air entry noted in bases, no wheezes  Heart - S1 and S2 normal  Abdomen - soft, nontender, nondistended, Bowel sounds present  Neurological -awake, follows verbal commands appropriately, moves both upper extremities well, no tremors noted  Extremities -bilateral lower extremity pedal edema noted      Labs/Imaging/Diagnostics    Labs:  CBC:  Recent Labs     08/15/24  0459 08/16/24  0200 08/17/24  0353   WBC 16.4* 16.6* 15.9*   RBC 3.60* 3.45* 3.16*   HGB 10.2* 9.7* 9.2*   HCT 33.5* 31.9* 29.2*   MCV 93.1 92.5 92.4   RDW 15.5* 15.6* 15.5*    326 356     CHEMISTRIES:  Recent Labs     08/15/24  0459 08/16/24  0200 08/17/24  0353    141 138   K 4.4 4.6 4.6    105 104   CO2 32 32 30   BUN 25* 22* 23*   CREATININE 1.07 1.03 1.02   GLUCOSE 139* 123* 135*   PHOS 2.8 2.4* 2.7   MG 2.2 1.9 2.0     PT/INR:No results for input(s): \"PROTIME\", \"INR\" in the last 72 hours.  APTT:  Recent Labs     08/15/24  1948 08/16/24  0200 08/16/24  1040   APTT 134.0* 100.4* 97.9*     LIVER PROFILE:  No results for input(s): \"AST\", \"ALT\", \"BILIDIR\", \"BILITOT\", \"ALKPHOS\" in the last 72 hours.    Imaging:  XR CHEST PORTABLE    Result Date: 8/12/2024  Further interval increase in right lung opacification obscuring the hemidiaphragm and involving most of the mid and lower lung. Probable additional left base  consolidation. 3.  Similar patchy atelectasis or consolidation LEFT lung base. 4.  Similar cardiomegaly  Electronically signed by Keila Berrios      Current Medications:  Current Facility-Administered Medications: furosemide (LASIX) tablet 40 mg, 40 mg, Oral, Daily  apixaban (ELIQUIS) tablet 5 mg, 5 mg, Oral, BID  insulin lispro (HUMALOG,ADMELOG) injection vial 0-16 Units, 0-16 Units, SubCUTAneous, 4x Daily AC & HS  lactobacillus (CULTURELLE) capsule 1 capsule, 1 capsule, Oral, Daily with breakfast  [Held by provider] insulin glargine (LANTUS) injection vial 35 Units, 35 Units, SubCUTAneous, Daily  LORazepam (ATIVAN) injection 2 mg, 2 mg, IntraVENous, Q8H PRN  ipratropium 0.5 mg-albuterol 2.5 mg (DUONEB) nebulizer solution 1 Dose, 1 Dose, Inhalation, Q4H PRN  phenol 1.4 % mouth spray 1 spray, 1 spray, Mouth/Throat, Q2H PRN  polyethylene glycol (GLYCOLAX) packet 34 g, 34 g, Oral, BID  glucose chewable tablet 16 g, 4 tablet, Oral, PRN  dextrose bolus 10% 125 mL, 125 mL, IntraVENous, PRN **OR** dextrose bolus 10% 250 mL, 250 mL, IntraVENous, PRN  Glucagon Emergency SOLR 1 mg, 1 mg, SubCUTAneous, PRN  dextrose 10 % infusion, , IntraVENous, Continuous PRN  thiamine (B-1) injection 200 mg, 200 mg, IntraVENous, TID  folic acid (FOLVITE) tablet 1 mg, 1 mg, Oral, Daily  sodium chloride flush 0.9 % injection 5-40 mL, 5-40 mL, IntraVENous, 2 times per day  sodium chloride flush 0.9 % injection 5-40 mL, 5-40 mL, IntraVENous, PRN  0.9 % sodium chloride infusion, , IntraVENous, PRN  potassium chloride 20 mEq/50 mL IVPB (Central Line), 20 mEq, IntraVENous, PRN **OR** potassium chloride 10 mEq/100 mL IVPB (Peripheral Line), 10 mEq, IntraVENous, PRN  magnesium sulfate 2000 mg in 50 mL IVPB premix, 2,000 mg, IntraVENous, PRN  polyethylene glycol (GLYCOLAX) packet 17 g, 17 g, Oral, Daily PRN  acetaminophen (TYLENOL) tablet 650 mg, 650 mg, Oral, Q6H PRN **OR** acetaminophen (TYLENOL) suppository 650 mg, 650 mg, Rectal, Q6H

## 2024-08-17 NOTE — PROGRESS NOTES
1500: Bedside and Verbal shift change report given to Kusum RN (oncoming nurse) by Aracelis LICEA (offgoing nurse). Report included the following information Nurse Handoff Report. Assumed care of pt.

## 2024-08-17 NOTE — PLAN OF CARE
Problem: Safety - Adult  Goal: Free from fall injury  Outcome: Progressing     Problem: Pain  Goal: Verbalizes/displays adequate comfort level or baseline comfort level  Outcome: Progressing  Flowsheets  Taken 8/17/2024 0408 by Haroldo Castro, RN  Verbalizes/displays adequate comfort level or baseline comfort level:   Encourage patient to monitor pain and request assistance   Assess pain using appropriate pain scale   Administer analgesics based on type and severity of pain and evaluate response  Taken 8/16/2024 2320 by Haroldo Castro RN  Verbalizes/displays adequate comfort level or baseline comfort level:   Encourage patient to monitor pain and request assistance   Assess pain using appropriate pain scale   Administer analgesics based on type and severity of pain and evaluate response

## 2024-08-18 LAB
ANION GAP SERPL CALC-SCNC: 4 MMOL/L (ref 3–18)
BASOPHILS # BLD: 0 K/UL (ref 0–0.1)
BASOPHILS NFR BLD: 0 % (ref 0–2)
BUN SERPL-MCNC: 25 MG/DL (ref 7–18)
BUN/CREAT SERPL: 22 (ref 12–20)
CALCIUM SERPL-MCNC: 8.6 MG/DL (ref 8.5–10.1)
CHLORIDE SERPL-SCNC: 102 MMOL/L (ref 100–111)
CO2 SERPL-SCNC: 31 MMOL/L (ref 21–32)
CREAT SERPL-MCNC: 1.15 MG/DL (ref 0.6–1.3)
DIFFERENTIAL METHOD BLD: ABNORMAL
EOSINOPHIL # BLD: 0.1 K/UL (ref 0–0.4)
EOSINOPHIL NFR BLD: 1 % (ref 0–5)
ERYTHROCYTE [DISTWIDTH] IN BLOOD BY AUTOMATED COUNT: 16.1 % (ref 11.6–14.5)
GLUCOSE BLD STRIP.AUTO-MCNC: 129 MG/DL (ref 70–110)
GLUCOSE BLD STRIP.AUTO-MCNC: 148 MG/DL (ref 70–110)
GLUCOSE BLD STRIP.AUTO-MCNC: 191 MG/DL (ref 70–110)
GLUCOSE BLD STRIP.AUTO-MCNC: 208 MG/DL (ref 70–110)
GLUCOSE SERPL-MCNC: 153 MG/DL (ref 74–99)
HCT VFR BLD AUTO: 28.5 % (ref 35–45)
HGB BLD-MCNC: 8.7 G/DL (ref 12–16)
IMM GRANULOCYTES # BLD AUTO: 0.1 K/UL (ref 0–0.04)
IMM GRANULOCYTES NFR BLD AUTO: 1 % (ref 0–0.5)
LYMPHOCYTES # BLD: 1.7 K/UL (ref 0.9–3.6)
LYMPHOCYTES NFR BLD: 12 % (ref 21–52)
MAGNESIUM SERPL-MCNC: 2.2 MG/DL (ref 1.6–2.6)
MCH RBC QN AUTO: 28.3 PG (ref 24–34)
MCHC RBC AUTO-ENTMCNC: 30.5 G/DL (ref 31–37)
MCV RBC AUTO: 92.8 FL (ref 78–100)
MONOCYTES # BLD: 0.9 K/UL (ref 0.05–1.2)
MONOCYTES NFR BLD: 6 % (ref 3–10)
NEUTS SEG # BLD: 11.5 K/UL (ref 1.8–8)
NEUTS SEG NFR BLD: 80 % (ref 40–73)
NRBC # BLD: 0 K/UL (ref 0–0.01)
NRBC BLD-RTO: 0 PER 100 WBC
PHOSPHATE SERPL-MCNC: 2.9 MG/DL (ref 2.5–4.9)
PLATELET # BLD AUTO: 185 K/UL (ref 135–420)
PLATELET COMMENT: ABNORMAL
PMV BLD AUTO: 11.6 FL (ref 9.2–11.8)
POTASSIUM SERPL-SCNC: 4.5 MMOL/L (ref 3.5–5.5)
PROCALCITONIN SERPL-MCNC: 0.13 NG/ML
RBC # BLD AUTO: 3.07 M/UL (ref 4.2–5.3)
RBC MORPH BLD: ABNORMAL
SODIUM SERPL-SCNC: 137 MMOL/L (ref 136–145)
WBC # BLD AUTO: 14.3 K/UL (ref 4.6–13.2)

## 2024-08-18 PROCEDURE — 6370000000 HC RX 637 (ALT 250 FOR IP): Performed by: PHYSICIAN ASSISTANT

## 2024-08-18 PROCEDURE — 6360000002 HC RX W HCPCS: Performed by: INTERNAL MEDICINE

## 2024-08-18 PROCEDURE — 80048 BASIC METABOLIC PNL TOTAL CA: CPT

## 2024-08-18 PROCEDURE — 82962 GLUCOSE BLOOD TEST: CPT

## 2024-08-18 PROCEDURE — 99232 SBSQ HOSP IP/OBS MODERATE 35: CPT | Performed by: INTERNAL MEDICINE

## 2024-08-18 PROCEDURE — 94660 CPAP INITIATION&MGMT: CPT

## 2024-08-18 PROCEDURE — 6370000000 HC RX 637 (ALT 250 FOR IP): Performed by: INTERNAL MEDICINE

## 2024-08-18 PROCEDURE — 36415 COLL VENOUS BLD VENIPUNCTURE: CPT

## 2024-08-18 PROCEDURE — 83735 ASSAY OF MAGNESIUM: CPT

## 2024-08-18 PROCEDURE — 6370000000 HC RX 637 (ALT 250 FOR IP): Performed by: NURSE PRACTITIONER

## 2024-08-18 PROCEDURE — 84100 ASSAY OF PHOSPHORUS: CPT

## 2024-08-18 PROCEDURE — 6360000002 HC RX W HCPCS: Performed by: NURSE PRACTITIONER

## 2024-08-18 PROCEDURE — 2140000001 HC CVICU INTERMEDIATE R&B

## 2024-08-18 PROCEDURE — 2580000003 HC RX 258: Performed by: INTERNAL MEDICINE

## 2024-08-18 PROCEDURE — 6370000000 HC RX 637 (ALT 250 FOR IP): Performed by: STUDENT IN AN ORGANIZED HEALTH CARE EDUCATION/TRAINING PROGRAM

## 2024-08-18 PROCEDURE — 84145 PROCALCITONIN (PCT): CPT

## 2024-08-18 PROCEDURE — 94669 MECHANICAL CHEST WALL OSCILL: CPT

## 2024-08-18 PROCEDURE — 2700000000 HC OXYGEN THERAPY PER DAY

## 2024-08-18 PROCEDURE — 94668 MNPJ CHEST WALL SBSQ: CPT

## 2024-08-18 PROCEDURE — 94640 AIRWAY INHALATION TREATMENT: CPT

## 2024-08-18 PROCEDURE — 94760 N-INVAS EAR/PLS OXIMETRY 1: CPT

## 2024-08-18 PROCEDURE — 94667 MNPJ CHEST WALL 1ST: CPT

## 2024-08-18 PROCEDURE — 85025 COMPLETE CBC W/AUTO DIFF WBC: CPT

## 2024-08-18 RX ORDER — SODIUM CHLORIDE FOR INHALATION 3 %
4 VIAL, NEBULIZER (ML) INHALATION
Status: DISCONTINUED | OUTPATIENT
Start: 2024-08-18 | End: 2024-08-18

## 2024-08-18 RX ORDER — ALBUTEROL SULFATE 0.83 MG/ML
2.5 SOLUTION RESPIRATORY (INHALATION)
Status: DISCONTINUED | OUTPATIENT
Start: 2024-08-18 | End: 2024-08-21

## 2024-08-18 RX ORDER — ALBUTEROL SULFATE 0.83 MG/ML
2.5 SOLUTION RESPIRATORY (INHALATION)
Status: DISCONTINUED | OUTPATIENT
Start: 2024-08-18 | End: 2024-08-18

## 2024-08-18 RX ORDER — SODIUM CHLORIDE FOR INHALATION 3 %
4 VIAL, NEBULIZER (ML) INHALATION
Status: DISCONTINUED | OUTPATIENT
Start: 2024-08-18 | End: 2024-08-21

## 2024-08-18 RX ORDER — GUAIFENESIN 600 MG/1
600 TABLET, EXTENDED RELEASE ORAL 2 TIMES DAILY
Status: DISCONTINUED | OUTPATIENT
Start: 2024-08-18 | End: 2024-08-24 | Stop reason: HOSPADM

## 2024-08-18 RX ORDER — AMLODIPINE BESYLATE 5 MG/1
5 TABLET ORAL DAILY
Status: DISCONTINUED | OUTPATIENT
Start: 2024-08-19 | End: 2024-08-24 | Stop reason: HOSPADM

## 2024-08-18 RX ADMIN — AMLODIPINE BESYLATE 2.5 MG: 5 TABLET ORAL at 09:23

## 2024-08-18 RX ADMIN — ALBUTEROL SULFATE 2.5 MG: 2.5 SOLUTION RESPIRATORY (INHALATION) at 14:52

## 2024-08-18 RX ADMIN — POLYETHYLENE GLYCOL 3350 34 G: 17 POWDER, FOR SOLUTION ORAL at 20:42

## 2024-08-18 RX ADMIN — ALBUTEROL SULFATE 2.5 MG: 2.5 SOLUTION RESPIRATORY (INHALATION) at 08:19

## 2024-08-18 RX ADMIN — ALBUTEROL SULFATE 2.5 MG: 2.5 SOLUTION RESPIRATORY (INHALATION) at 21:03

## 2024-08-18 RX ADMIN — SODIUM CHLORIDE SOLN NEBU 3% 4 ML: 3 NEBU SOLN at 14:52

## 2024-08-18 RX ADMIN — THIAMINE HYDROCHLORIDE 200 MG: 100 INJECTION, SOLUTION INTRAMUSCULAR; INTRAVENOUS at 20:42

## 2024-08-18 RX ADMIN — INSULIN LISPRO 4 UNITS: 100 INJECTION, SOLUTION INTRAVENOUS; SUBCUTANEOUS at 12:50

## 2024-08-18 RX ADMIN — INSULIN GLARGINE 7 UNITS: 100 INJECTION, SOLUTION SUBCUTANEOUS at 09:22

## 2024-08-18 RX ADMIN — Medication 1 CAPSULE: at 09:22

## 2024-08-18 RX ADMIN — APIXABAN 5 MG: 5 TABLET, FILM COATED ORAL at 20:42

## 2024-08-18 RX ADMIN — THIAMINE HYDROCHLORIDE 200 MG: 100 INJECTION, SOLUTION INTRAMUSCULAR; INTRAVENOUS at 17:00

## 2024-08-18 RX ADMIN — GUAIFENESIN 600 MG: 600 TABLET, EXTENDED RELEASE ORAL at 12:41

## 2024-08-18 RX ADMIN — FOLIC ACID 1 MG: 1 TABLET ORAL at 09:23

## 2024-08-18 RX ADMIN — APIXABAN 5 MG: 5 TABLET, FILM COATED ORAL at 09:22

## 2024-08-18 RX ADMIN — ALBUTEROL SULFATE 2.5 MG: 2.5 SOLUTION RESPIRATORY (INHALATION) at 11:26

## 2024-08-18 RX ADMIN — SODIUM CHLORIDE SOLN NEBU 3% 4 ML: 3 NEBU SOLN at 21:04

## 2024-08-18 RX ADMIN — SODIUM CHLORIDE, PRESERVATIVE FREE 10 ML: 5 INJECTION INTRAVENOUS at 20:43

## 2024-08-18 RX ADMIN — ALBUTEROL SULFATE 2.5 MG: 2.5 SOLUTION RESPIRATORY (INHALATION) at 01:03

## 2024-08-18 RX ADMIN — THIAMINE HYDROCHLORIDE 200 MG: 100 INJECTION, SOLUTION INTRAMUSCULAR; INTRAVENOUS at 09:23

## 2024-08-18 RX ADMIN — SODIUM CHLORIDE, PRESERVATIVE FREE 10 ML: 5 INJECTION INTRAVENOUS at 09:23

## 2024-08-18 RX ADMIN — GUAIFENESIN 600 MG: 600 TABLET, EXTENDED RELEASE ORAL at 20:42

## 2024-08-18 RX ADMIN — FUROSEMIDE 40 MG: 40 TABLET ORAL at 09:23

## 2024-08-18 ASSESSMENT — PAIN SCALES - GENERAL
PAINLEVEL_OUTOF10: 0

## 2024-08-18 NOTE — PLAN OF CARE
Problem: Safety - Adult  Goal: Free from fall injury  8/18/2024 0733 by Kusum Bae RN  Outcome: Progressing  8/18/2024 0641 by Duke Acosta RN  Outcome: Progressing     Problem: Chronic Conditions and Co-morbidities  Goal: Patient's chronic conditions and co-morbidity symptoms are monitored and maintained or improved  8/18/2024 0733 by Kusum Bae RN  Outcome: Progressing  8/18/2024 0641 by Duke Acosta RN  Outcome: Progressing     Problem: Discharge Planning  Goal: Discharge to home or other facility with appropriate resources  8/18/2024 0733 by Kusum Bae RN  Outcome: Progressing  8/18/2024 0641 by Duke Acosta RN  Outcome: Progressing     Problem: Pain  Goal: Verbalizes/displays adequate comfort level or baseline comfort level  8/18/2024 0733 by Kusum Bae RN  Outcome: Progressing  8/18/2024 0641 by Duke Acosta RN  Outcome: Progressing     Problem: ABCDS Injury Assessment  Goal: Absence of physical injury  8/18/2024 0733 by Kusum Bae RN  Outcome: Progressing  8/18/2024 0641 by Duke Acosta RN  Outcome: Progressing     Problem: Nutrition Deficit:  Goal: Optimize nutritional status  8/18/2024 0733 by Kusum Bae RN  Outcome: Progressing  8/18/2024 0641 by Duke Acosta RN  Outcome: Progressing     Problem: Skin/Tissue Integrity  Goal: Absence of new skin breakdown  Description: 1.  Monitor for areas of redness and/or skin breakdown  2.  Assess vascular access sites hourly  3.  Every 4-6 hours minimum:  Change oxygen saturation probe site  4.  Every 4-6 hours:  If on nasal continuous positive airway pressure, respiratory therapy assess nares and determine need for appliance change or resting period.  8/18/2024 0733 by Kusum Bae RN  Outcome: Progressing  8/18/2024 0641 by Duke Acosta RN  Outcome: Progressing

## 2024-08-18 NOTE — PLAN OF CARE
Problem: Safety - Adult  Goal: Free from fall injury  Outcome: Progressing     Problem: Chronic Conditions and Co-morbidities  Goal: Patient's chronic conditions and co-morbidity symptoms are monitored and maintained or improved  Outcome: Progressing     Problem: Pain  Goal: Verbalizes/displays adequate comfort level or baseline comfort level  Outcome: Progressing     Problem: ABCDS Injury Assessment  Goal: Absence of physical injury  Outcome: Progressing

## 2024-08-18 NOTE — PROGRESS NOTES
Bedside and Verbal shift change report given to Kusum RN (oncoming nurse) by Duke RN (offgoing nurse). Report included the following information Nurse Handoff Report.     0915: Pt removed from BIPAP and put on 30L HiFlow 35% FiO2. Tolerated well. Given breakfast tray.    1030: Placed back on BIPAP d/t pt request.    1134: Pt on 6L NC. Tolerating well.    1200: Orders to keep pt off R side. Pt verbalized understanding. Pillows and wedges placed on R side to prevent turning that direction.

## 2024-08-18 NOTE — PROGRESS NOTES
4 Eyes Skin Assessment     NAME:  Jasmin Pacheco  YOB: 1945  MEDICAL RECORD NUMBER:  [id number]    The patient is being assessed for  Admission    I agree that at least one RN has performed a thorough Head to Toe Skin Assessment on the patient. ALL assessment sites listed below have been assessed.      Areas assessed by both nurses:    Head, Face, Ears, Shoulders, Back, Chest, Arms, Elbows, Hands, Sacrum. Buttock, Coccyx, Ischium, Legs. Feet and Heels, Under Medical Devices , and Other          Does the Patient have a Wound? No noted wound(s)       Renard Prevention initiated by RN: Yes  Wound Care Orders initiated by RN: No    Pressure Injury (Stage 3,4, Unstageable, DTI, NWPT, and Complex wounds) if present, place Wound referral order by RN under : No    New Ostomies, if present place, Ostomy referral order under : No     Nurse 1 eSignature: Electronically signed by Duke Acosta RN on 8/18/24 at 3:19 AM EDT    **SHARE this note so that the co-signing nurse can place an eSignature**    Nurse 2 eSignature: {Esignature:[id number]}

## 2024-08-18 NOTE — PROGRESS NOTES
[facility] Hospitalist Group  Progress Note    Date:8/18/2024       Room:[facility]  Patient Name:Jasmin Pacheco     YOB: 1945     Age:78 y.o.        Subjective      Patient is currently on high flow oxygen.  Dyspnea on exertion present.  No chest pain or abdominal pain.  No cough.    Objective           BP (!) 145/69   Pulse 75   Temp 98.4 °F (36.9 °C) (Axillary)   Resp 18   Ht 1.702 m (5' 7\")   Wt (!) 140 kg (308 lb 10.3 oz)   SpO2 97%   BMI 48.34 kg/m²       General appearance - alert, well appearing, high flow oxygen is in situ, and in no distress  Chest -diminished air entry noted in bases, no wheezes  Heart - S1 and S2 normal  Abdomen - soft, nontender, nondistended, Bowel sounds present  Neurological -awake, follows verbal commands appropriately, moves both upper extremities well, no tremors noted  Extremities -bilateral lower extremity pedal edema noted      Labs/Imaging/Diagnostics    Labs:  CBC:  Recent Labs     08/16/24 0200 08/17/24  0353 08/18/24  0240   WBC 16.6* 15.9* 14.3*   RBC 3.45* 3.16* 3.07*   HGB 9.7* 9.2* 8.7*   HCT 31.9* 29.2* 28.5*   MCV 92.5 92.4 92.8   RDW 15.6* 15.5* 16.1*    356 185     CHEMISTRIES:  Recent Labs     08/16/24 0200 08/17/24  0353 08/18/24  0240    138 137   K 4.6 4.6 4.5    104 102   CO2 32 30 31   BUN 22* 23* 25*   CREATININE 1.03 1.02 1.15   GLUCOSE 123* 135* 153*   PHOS 2.4* 2.7 2.9   MG 1.9 2.0 2.2     PT/INR:No results for input(s): \"PROTIME\", \"INR\" in the last 72 hours.  APTT:  Recent Labs     08/15/24  1948 08/16/24  0200 08/16/24  1040   APTT 134.0* 100.4* 97.9*     LIVER PROFILE:  No results for input(s): \"AST\", \"ALT\", \"BILIDIR\", \"BILITOT\", \"ALKPHOS\" in the last 72 hours.    Imaging:  XR CHEST PORTABLE    Result Date: 8/12/2024  Further interval increase in right lung opacification obscuring the hemidiaphragm and involving most of the mid and lower lung. Probable additional left base

## 2024-08-18 NOTE — PROGRESS NOTES
[facility]  Pulmonary, Critical Care, and Sleep Medicine    Name: Jasmin Pacheco MRN: [id number]   : 1945 Hospital: [facility]   Date: 2024        IMPRESSION:   Acute hypoxic and hypercapnic respiratory failure requiring intubation due to acute heart failure and likely mild ARDS - s/p extubation - on HFNC & BiPAP.  COVID pneumonia: dx 24.  Severe sepsis due to above, POA, possible bacterial infection superimposed on COVID-pneumonia; resolved  Metabolic encephalopathy - likely toxic/metabolic; resolved  Acute on chronic diastolic heart failure (ECHO 24), grade 2 diastolic dysfunction with an ejection fraction of 55 to 60%, mod pHTN  Contraction alkalosis due to diuresis - resolved  Hypoglycemia - resolved  LEA on CKD 3B  Morbid obesity, Body mass index is 48.92 kg/m².  T2DM   Hx PE - on [employer] outpatient w/ VQ scan \"high probability\" of PE 24     Patient Active Problem List   Diagnosis    Acute respiratory failure (HCC)    Acute on chronic diastolic heart failure (HCC)    Hyperlipidemia    Type 2 diabetes mellitus with obesity (HCC)    Acute cystitis    Shortness of breath    COPD exacerbation (HCC)    Acute on chronic respiratory failure with hypoxia and hypercapnia (HCC)    Acute respiratory failure with hypoxia and hypercarbia (HCC)    Congestive heart failure (HCC)    Pulmonary embolism (HCC)    COVID    ARDS (adult respiratory distress syndrome) (HCC)      PLAN:   Supplemental oxygen to maintain SpO2 >88%; avoid hyperoxygenation -transitioned to 6 L/min this Am -SpO2 98% during my visit; continue BIPAP 16/; 35% FIO2 with sleep.  Rpt CXR with complete Rt Henok-opacification; suspect due to mucous plugging.  Start on Mucinex, MetaNeb along with 3% nebulized saline every 4 hours added; discussed with RT.  Repeat CXR in AM.  Recc keep off Right side  Continue diuretic regimen- on Lasix 40 mg daily - suspect hypervolemia likely etiology of persistent  08/16/24  0200 08/17/24  0353 08/18/24  0240   WBC 16.6* 15.9* 14.3*   HGB 9.7* 9.2* 8.7*   HCT 31.9* 29.2* 28.5*    356 185     Recent Labs     08/16/24  0200 08/17/24  0353 08/18/24  0240    138 137   K 4.6 4.6 4.5    104 102   CO2 32 30 31   BUN 22* 23* 25*   MG 1.9 2.0 2.2   PHOS 2.4* 2.7 2.9     No results for input(s): \"PH\", \"PCO2\", \"PO2\", \"HCO3\", \"FIO2\" in the last 72 hours.                                                     Echo:   08/05/24    ECHO (TTE) COMPLETE (PRN CONTRAST/BUBBLE/STRAIN/3D) 08/06/2024  3:20 PM (Final)    Interpretation Summary    Image quality is technically difficult. Contrast used: Definity. Technically difficult study with poor endocardial visualization, limited Doppler study due to patient's condition and procedure performed with the patient in a sitting position.  Patient ventilated.    Left Ventricle: Normal left ventricular systolic function with a visually estimated EF of 55 - 60%. EF by 2D Simpsons Biplane is 58%. Left ventricle size is normal. Unable to assess wall thickness. No obvious regional wall motion abnormalities. Grade II diastolic dysfunction with increased LAP.    Right Ventricle: Not well visualized. Right ventricle is dilated.  Significantly dilated by visual estimate.    Tricuspid Valve: Valve structure is normal. Mild regurgitation. No stenosis noted. Unable to assess RVSP.  Patient ventilated.  Moderate pulmonary hypertension likely present.    Right Atrium: Not well visualized. Right atrium is dilated by visual estimate.    Signed by: Jaleel Delgado MD on 8/6/2024  3:20 PM      Imaging:  [x]I have personally reviewed the patient’s radiographs  []Radiographs reviewed with radiologist   []No change from prior, tubes and lines in adequate position  []Improved   []Worsening    High complexity decision making was performed during the evaluation of this patient at high risk for decompensation with multiple organ involvement     Above mentioned

## 2024-08-19 ENCOUNTER — APPOINTMENT (OUTPATIENT)
Facility: HOSPITAL | Age: 79
DRG: 870 | End: 2024-08-19
Attending: INTERNAL MEDICINE
Payer: MEDICARE

## 2024-08-19 PROBLEM — E66.01 MORBID OBESITY (HCC): Status: ACTIVE | Noted: 2024-08-19

## 2024-08-19 PROBLEM — J96.02 ACUTE HYPERCAPNIC RESPIRATORY FAILURE (HCC): Status: ACTIVE | Noted: 2024-08-19

## 2024-08-19 LAB
ANION GAP SERPL CALC-SCNC: 3 MMOL/L (ref 3–18)
BASOPHILS # BLD: 0 K/UL (ref 0–0.1)
BASOPHILS NFR BLD: 0 % (ref 0–2)
BUN SERPL-MCNC: 27 MG/DL (ref 7–18)
BUN/CREAT SERPL: 25 (ref 12–20)
CALCIUM SERPL-MCNC: 8 MG/DL (ref 8.5–10.1)
CHLORIDE SERPL-SCNC: 104 MMOL/L (ref 100–111)
CO2 SERPL-SCNC: 32 MMOL/L (ref 21–32)
CREAT SERPL-MCNC: 1.09 MG/DL (ref 0.6–1.3)
DIFFERENTIAL METHOD BLD: ABNORMAL
EOSINOPHIL # BLD: 0.2 K/UL (ref 0–0.4)
EOSINOPHIL NFR BLD: 2 % (ref 0–5)
ERYTHROCYTE [DISTWIDTH] IN BLOOD BY AUTOMATED COUNT: 16.3 % (ref 11.6–14.5)
GLUCOSE BLD STRIP.AUTO-MCNC: 167 MG/DL (ref 70–110)
GLUCOSE BLD STRIP.AUTO-MCNC: 187 MG/DL (ref 70–110)
GLUCOSE BLD STRIP.AUTO-MCNC: 207 MG/DL (ref 70–110)
GLUCOSE BLD STRIP.AUTO-MCNC: 287 MG/DL (ref 70–110)
GLUCOSE SERPL-MCNC: 124 MG/DL (ref 74–99)
HCT VFR BLD AUTO: 27.5 % (ref 35–45)
HGB BLD-MCNC: 8.5 G/DL (ref 12–16)
IMM GRANULOCYTES # BLD AUTO: 0.1 K/UL (ref 0–0.04)
IMM GRANULOCYTES NFR BLD AUTO: 1 % (ref 0–0.5)
LYMPHOCYTES # BLD: 2 K/UL (ref 0.9–3.6)
LYMPHOCYTES NFR BLD: 15 % (ref 21–52)
MAGNESIUM SERPL-MCNC: 1.9 MG/DL (ref 1.6–2.6)
MCH RBC QN AUTO: 28.6 PG (ref 24–34)
MCHC RBC AUTO-ENTMCNC: 30.9 G/DL (ref 31–37)
MCV RBC AUTO: 92.6 FL (ref 78–100)
MONOCYTES # BLD: 0.8 K/UL (ref 0.05–1.2)
MONOCYTES NFR BLD: 6 % (ref 3–10)
NEUTS SEG # BLD: 9.9 K/UL (ref 1.8–8)
NEUTS SEG NFR BLD: 76 % (ref 40–73)
NRBC # BLD: 0 K/UL (ref 0–0.01)
NRBC BLD-RTO: 0 PER 100 WBC
PHOSPHATE SERPL-MCNC: 2.5 MG/DL (ref 2.5–4.9)
PLATELET # BLD AUTO: 378 K/UL (ref 135–420)
PMV BLD AUTO: 10 FL (ref 9.2–11.8)
POTASSIUM SERPL-SCNC: 4.5 MMOL/L (ref 3.5–5.5)
RBC # BLD AUTO: 2.97 M/UL (ref 4.2–5.3)
SODIUM SERPL-SCNC: 139 MMOL/L (ref 136–145)
WBC # BLD AUTO: 13 K/UL (ref 4.6–13.2)

## 2024-08-19 PROCEDURE — 6370000000 HC RX 637 (ALT 250 FOR IP): Performed by: INTERNAL MEDICINE

## 2024-08-19 PROCEDURE — 71045 X-RAY EXAM CHEST 1 VIEW: CPT

## 2024-08-19 PROCEDURE — 2140000001 HC CVICU INTERMEDIATE R&B

## 2024-08-19 PROCEDURE — 6370000000 HC RX 637 (ALT 250 FOR IP): Performed by: STUDENT IN AN ORGANIZED HEALTH CARE EDUCATION/TRAINING PROGRAM

## 2024-08-19 PROCEDURE — 2580000003 HC RX 258: Performed by: INTERNAL MEDICINE

## 2024-08-19 PROCEDURE — 36415 COLL VENOUS BLD VENIPUNCTURE: CPT

## 2024-08-19 PROCEDURE — 97168 OT RE-EVAL EST PLAN CARE: CPT

## 2024-08-19 PROCEDURE — 6360000002 HC RX W HCPCS: Performed by: NURSE PRACTITIONER

## 2024-08-19 PROCEDURE — 84100 ASSAY OF PHOSPHORUS: CPT

## 2024-08-19 PROCEDURE — 6370000000 HC RX 637 (ALT 250 FOR IP): Performed by: PHYSICIAN ASSISTANT

## 2024-08-19 PROCEDURE — 99232 SBSQ HOSP IP/OBS MODERATE 35: CPT | Performed by: INTERNAL MEDICINE

## 2024-08-19 PROCEDURE — 94640 AIRWAY INHALATION TREATMENT: CPT

## 2024-08-19 PROCEDURE — 92526 ORAL FUNCTION THERAPY: CPT

## 2024-08-19 PROCEDURE — 6370000000 HC RX 637 (ALT 250 FOR IP): Performed by: NURSE PRACTITIONER

## 2024-08-19 PROCEDURE — 82962 GLUCOSE BLOOD TEST: CPT

## 2024-08-19 PROCEDURE — 80048 BASIC METABOLIC PNL TOTAL CA: CPT

## 2024-08-19 PROCEDURE — 94761 N-INVAS EAR/PLS OXIMETRY MLT: CPT

## 2024-08-19 PROCEDURE — 2700000000 HC OXYGEN THERAPY PER DAY

## 2024-08-19 PROCEDURE — 83735 ASSAY OF MAGNESIUM: CPT

## 2024-08-19 PROCEDURE — 6360000002 HC RX W HCPCS: Performed by: INTERNAL MEDICINE

## 2024-08-19 PROCEDURE — 97164 PT RE-EVAL EST PLAN CARE: CPT

## 2024-08-19 PROCEDURE — 97112 NEUROMUSCULAR REEDUCATION: CPT

## 2024-08-19 PROCEDURE — 97535 SELF CARE MNGMENT TRAINING: CPT

## 2024-08-19 PROCEDURE — 85025 COMPLETE CBC W/AUTO DIFF WBC: CPT

## 2024-08-19 PROCEDURE — 99232 SBSQ HOSP IP/OBS MODERATE 35: CPT | Performed by: FAMILY MEDICINE

## 2024-08-19 PROCEDURE — 94660 CPAP INITIATION&MGMT: CPT

## 2024-08-19 PROCEDURE — 94668 MNPJ CHEST WALL SBSQ: CPT

## 2024-08-19 RX ADMIN — POLYETHYLENE GLYCOL 3350 34 G: 17 POWDER, FOR SOLUTION ORAL at 21:40

## 2024-08-19 RX ADMIN — ALBUTEROL SULFATE 2.5 MG: 2.5 SOLUTION RESPIRATORY (INHALATION) at 12:51

## 2024-08-19 RX ADMIN — ALBUTEROL SULFATE 2.5 MG: 2.5 SOLUTION RESPIRATORY (INHALATION) at 00:30

## 2024-08-19 RX ADMIN — SODIUM CHLORIDE, PRESERVATIVE FREE 10 ML: 5 INJECTION INTRAVENOUS at 08:27

## 2024-08-19 RX ADMIN — INSULIN LISPRO 8 UNITS: 100 INJECTION, SOLUTION INTRAVENOUS; SUBCUTANEOUS at 22:04

## 2024-08-19 RX ADMIN — SODIUM CHLORIDE SOLN NEBU 3% 4 ML: 3 NEBU SOLN at 04:56

## 2024-08-19 RX ADMIN — APIXABAN 5 MG: 5 TABLET, FILM COATED ORAL at 21:40

## 2024-08-19 RX ADMIN — THIAMINE HYDROCHLORIDE 200 MG: 100 INJECTION, SOLUTION INTRAMUSCULAR; INTRAVENOUS at 08:22

## 2024-08-19 RX ADMIN — Medication 1 CAPSULE: at 08:20

## 2024-08-19 RX ADMIN — SODIUM CHLORIDE SOLN NEBU 3% 4 ML: 3 NEBU SOLN at 21:14

## 2024-08-19 RX ADMIN — INSULIN LISPRO 4 UNITS: 100 INJECTION, SOLUTION INTRAVENOUS; SUBCUTANEOUS at 17:14

## 2024-08-19 RX ADMIN — ALBUTEROL SULFATE 2.5 MG: 2.5 SOLUTION RESPIRATORY (INHALATION) at 16:23

## 2024-08-19 RX ADMIN — ALBUTEROL SULFATE 2.5 MG: 2.5 SOLUTION RESPIRATORY (INHALATION) at 08:36

## 2024-08-19 RX ADMIN — ALBUTEROL SULFATE 2.5 MG: 2.5 SOLUTION RESPIRATORY (INHALATION) at 21:11

## 2024-08-19 RX ADMIN — GUAIFENESIN 600 MG: 600 TABLET, EXTENDED RELEASE ORAL at 21:40

## 2024-08-19 RX ADMIN — ALBUTEROL SULFATE 2.5 MG: 2.5 SOLUTION RESPIRATORY (INHALATION) at 04:56

## 2024-08-19 RX ADMIN — SODIUM CHLORIDE SOLN NEBU 3% 4 ML: 3 NEBU SOLN at 00:30

## 2024-08-19 RX ADMIN — AMLODIPINE BESYLATE 5 MG: 5 TABLET ORAL at 08:20

## 2024-08-19 RX ADMIN — SODIUM CHLORIDE, PRESERVATIVE FREE 20 ML: 5 INJECTION INTRAVENOUS at 21:48

## 2024-08-19 RX ADMIN — INSULIN GLARGINE 7 UNITS: 100 INJECTION, SOLUTION SUBCUTANEOUS at 08:30

## 2024-08-19 RX ADMIN — SODIUM CHLORIDE SOLN NEBU 3% 4 ML: 3 NEBU SOLN at 12:51

## 2024-08-19 RX ADMIN — THIAMINE HYDROCHLORIDE 200 MG: 100 INJECTION, SOLUTION INTRAMUSCULAR; INTRAVENOUS at 15:43

## 2024-08-19 RX ADMIN — SODIUM CHLORIDE SOLN NEBU 3% 4 ML: 3 NEBU SOLN at 16:23

## 2024-08-19 RX ADMIN — POLYETHYLENE GLYCOL 3350 34 G: 17 POWDER, FOR SOLUTION ORAL at 08:19

## 2024-08-19 RX ADMIN — FUROSEMIDE 40 MG: 40 TABLET ORAL at 08:20

## 2024-08-19 RX ADMIN — THIAMINE HYDROCHLORIDE 200 MG: 100 INJECTION, SOLUTION INTRAMUSCULAR; INTRAVENOUS at 21:40

## 2024-08-19 RX ADMIN — APIXABAN 5 MG: 5 TABLET, FILM COATED ORAL at 08:30

## 2024-08-19 RX ADMIN — FOLIC ACID 1 MG: 1 TABLET ORAL at 08:20

## 2024-08-19 RX ADMIN — GUAIFENESIN 600 MG: 600 TABLET, EXTENDED RELEASE ORAL at 08:20

## 2024-08-19 ASSESSMENT — PAIN SCALES - GENERAL
PAINLEVEL_OUTOF10: 0

## 2024-08-19 NOTE — PROGRESS NOTES
[facility]  Pulmonary, Critical Care, and Sleep Medicine    Name: Jasmin Pacheco MRN: [id number]   : 1945 Hospital: [facility]   Date: 2024        IMPRESSION:   Acute on chronic hypoxic and hypercapnic respiratory failure requiring intubation due to acute heart failure and likely mild ARDS - s/p extubation - on HFNC & BiPAP. On LTOT - 2L/min.  COVID pneumonia: dx 24.  Severe sepsis due to above, POA, possible bacterial infection superimposed on COVID-pneumonia; resolved  Metabolic encephalopathy - likely toxic/metabolic; resolved  Acute on chronic diastolic heart failure (ECHO 24), grade 2 diastolic dysfunction with an ejection fraction of 55 to 60%, mod pHTN  Contraction alkalosis due to diuresis - resolved  Hypoglycemia - resolved  LEA on CKD 3B  Morbid obesity, Body mass index is 48.92 kg/m².  T2DM   Hx PE - on [email or web address] outpatient w/ VQ scan \"high probability\" of PE 24     Patient Active Problem List   Diagnosis    Acute respiratory failure (HCC)    Acute on chronic diastolic heart failure (HCC)    Hyperlipidemia    Type 2 diabetes mellitus with obesity (HCC)    Acute cystitis    Shortness of breath    COPD exacerbation (HCC)    Acute on chronic respiratory failure with hypoxia and hypercapnia (HCC)    Acute respiratory failure with hypoxia and hypercarbia (HCC)    Congestive heart failure (HCC)    Pulmonary embolism (HCC)    COVID    ARDS (adult respiratory distress syndrome) (HCC)      PLAN:   Supplemental oxygen to maintain SpO2 >88%; avoid hyperoxygenation - I weaned patient to 4L/min this Am; continue BIPAP 16/8; 35% FIO2 with sleep.  Rpt CXR with complete Rt Henok-opacification; suspect due to mucous plugging.  Continue Mucinex, MetaNeb along with 3% nebulized saline every 4 hours added; discussed with RT.  Repeat CXR pending  Recc keep off Right side  Continue diuretic regimen- on Lasix 40 mg daily - suspect hypervolemia likely etiology of  organomegaly or guarding   Extremity: No lower extremity edema.   Neuro: alert, grossly nonfocal.   Skin: Skin color, texture, turgor normal. No rashes or lesions        DATA:  Labs:  Recent Labs     08/17/24  0353 08/18/24  0240 08/19/24  0331   WBC 15.9* 14.3* 13.0   HGB 9.2* 8.7* 8.5*   HCT 29.2* 28.5* 27.5*    185 378     Recent Labs     08/17/24  0353 08/18/24  0240 08/19/24  0331    137 139   K 4.6 4.5 4.5    102 104   CO2 30 31 32   BUN 23* 25* 27*   MG 2.0 2.2 1.9   PHOS 2.7 2.9 2.5     No results for input(s): \"PH\", \"PCO2\", \"PO2\", \"HCO3\", \"FIO2\" in the last 72 hours.                                                       Echo:   08/05/24    ECHO (TTE) COMPLETE (PRN CONTRAST/BUBBLE/STRAIN/3D) 08/06/2024  3:20 PM (Final)    Interpretation Summary    Image quality is technically difficult. Contrast used: Definity. Technically difficult study with poor endocardial visualization, limited Doppler study due to patient's condition and procedure performed with the patient in a sitting position.  Patient ventilated.    Left Ventricle: Normal left ventricular systolic function with a visually estimated EF of 55 - 60%. EF by 2D Simpsons Biplane is 58%. Left ventricle size is normal. Unable to assess wall thickness. No obvious regional wall motion abnormalities. Grade II diastolic dysfunction with increased LAP.    Right Ventricle: Not well visualized. Right ventricle is dilated.  Significantly dilated by visual estimate.    Tricuspid Valve: Valve structure is normal. Mild regurgitation. No stenosis noted. Unable to assess RVSP.  Patient ventilated.  Moderate pulmonary hypertension likely present.    Right Atrium: Not well visualized. Right atrium is dilated by visual estimate.    Signed by: Jaleel Delgado MD on 8/6/2024  3:20 PM      Imaging:  [x]I have personally reviewed the patient’s radiographs  []Radiographs reviewed with radiologist   []No change from prior, tubes and lines in adequate

## 2024-08-19 NOTE — PLAN OF CARE
Problem: Safety - Adult  Goal: Free from fall injury  Outcome: Progressing     Problem: Chronic Conditions and Co-morbidities  Goal: Patient's chronic conditions and co-morbidity symptoms are monitored and maintained or improved  Outcome: Progressing     Problem: Pain  Goal: Verbalizes/displays adequate comfort level or baseline comfort level  Outcome: Progressing     Problem: ABCDS Injury Assessment  Goal: Absence of physical injury  Outcome: Progressing     Problem: Skin/Tissue Integrity  Goal: Absence of new skin breakdown  Description: 1.  Monitor for areas of redness and/or skin breakdown  2.  Assess vascular access sites hourly  3.  Every 4-6 hours minimum:  Change oxygen saturation probe site  4.  Every 4-6 hours:  If on nasal continuous positive airway pressure, respiratory therapy assess nares and determine need for appliance change or resting period.  Outcome: Progressing

## 2024-08-19 NOTE — PROGRESS NOTES
[facility] Hospitalist [facility]  Progress Note    Patient: Jasmin Pacheco Age: 78 y.o. : 1945 MR#: [id number] SSN: [id number]      Subjective/24-hour events:     Denies SOB acutely.    Assessment:   Acute hypoxic and hypercapnic respiratory failure  Acute on chronic diastolic CHF  COVID-19 pneumonia  Contraction alkalosis secondary to diuresis  DM2  Morbid obesity  Acute metabolic encephalopathy, resolved  History of PE, on anticoagulation with Eliquis    Plan:   Monitoring off of antibiotics.  Should be able to remove droplet plus precautions at this juncture.  Have asked nursing to confirm w/infection control.  Supplemental oxygen.  Able to wean to 4 L/min earlier today.  R eugene-opacification secondary to mucous plugging.  Continue pulmonary hygiene as ordered.  Repeat imaging as deemed necessary per pulmonology.  Continued assistance appreciated  PT/OT as able/tolerated.  SNF disposition discussed w/patient today - she does not seem to be amenable to placeemvent even if recommended.  States that she prefers to go home with [facility].  Assess need for oxygen prior to discharge.  Supportive care o/w.  Follow.    Anticipated discharge: -/[facility] v SNF    Case discussed with:  [x]Patient  []Family  [x] Nursing  [x]Case Management  DVT Prophylaxis:  []Lovenox  []Hep SQ  []SCDs  []Coumadin   []On Heparin gtt []PO anticoagulant    Objective:   VS: BP (!) 130/52   Pulse 100   Temp 98.7 °F (37.1 °C) (Oral)   Resp 23   Ht 1.702 m (5' 7\")   Wt (!) 140 kg (308 lb 10.3 oz)   SpO2 96%   BMI 48.34 kg/m²      Tmax/24hrs: Temp (24hrs), Av.1 °F (36.7 °C), Min:97.5 °F (36.4 °C), Max:98.7 °F (37.1 °C)    Intake/Output Summary (Last 24 hours) at 2024 1319  Last data filed at 2024 1240  Gross per 24 hour   Intake 0 ml   Output 2500 ml   Net -2500 ml       Gen:  In NAD.  Nontoxic-appearing.  Lungs: Clear, no wheezes  Effort nonlabored.  CV: RRR.  Abdomen: Soft, NTTP.  Extremities: Warm, no

## 2024-08-19 NOTE — PLAN OF CARE
Problem: SLP Adult - Impaired Swallowing  Goal: By Discharge: Advance to least restrictive diet without signs or symptoms of aspiration for planned discharge setting.  See evaluation for individualized goals.  Description:     Patient will:  1. Tolerate PO trials with 0 s/s overt distress in 4/5 trials  2. Utilize compensatory swallow strategies/maneuvers (decrease bite/sip, size/rate, alt. liq/sol) with min cues in 4/5 trials  3. Perform oral-motor/laryngeal exercises to increase oropharyngeal swallow function with min cues  4. Complete an objective swallow study (i.e., MBSS) to assess swallow integrity, r/o aspiration, and determine of safest LRD, min A as indicated/ordered by MD     Rec:     Regular diet with thin liquids  Aspiration precautions  HOB >45 during po intake, remain >30 for 30-45 minutes after po   Small bites/sips; alternate liquid/solid with slow feeding rate   Oral care TID  Meds per pt preference    Outcome: Progressing   SPEECH LANGUAGE PATHOLOGY DYSPHAGIA TREATMENT    Patient: Jasmin Pacheco (78 y.o. female)  Date: 8/19/2024  Diagnosis: Acute respiratory failure with hypoxia and hypercarbia (HCC) [J96.01, J96.02] Acute respiratory failure with hypoxia and hypercarbia (HCC)      Precautions: Standard, aspiration  PLOF: As per H&P         ASSESSMENT:  Pt was seen for follow up dysphagia management.  Strength, ROM, and coordination of the orofacial musculature were all found to be WFL. Pt tolerated reg solid, puree, and thin liquids +/- straw consecutive swallows without any overt s/sx of aspiration. Mastication was appropriate with timely a-p transit. Positive oral clearance observed across all trials. Pt safe for initiation of reg solid diet with thin liquids, aspiration precautions, oral care TID, and meds as tolerated. ST will continue to follow x 1-2 visits to ensure safety of diet tolerance.    Progression toward goals:  [x]         Improving appropriately and progressing toward goals  []

## 2024-08-19 NOTE — PROGRESS NOTES
Pt s/o pain r/t FMS, balloon noted deflated.  FMS removed and replaced with new FMS, pt expressed relief from pain

## 2024-08-19 NOTE — PROGRESS NOTES
Comprehensive Nutrition Assessment    Type and Reason for Visit:  Reassess    Nutrition Recommendations/Plan:   Continue current diet as tolerated.  Continue oral supplements: Magic Cup (each provides 290 kcal, 9g protein) once daily and Gelatein 20 (each provides 80 kcal, 20g protein) BID  Continue to monitor tolerance of PO, compliance of oral supplements, weight, labs, and plan of care during admission.     Malnutrition Assessment:  Malnutrition Status:  Insufficient data (MARANDA/NFPE defered to follow up, pt visually well nourished) (08/19/24 1444)    Context:  Acute Illness         Nutrition Assessment:    Admitted from [facility] for dyspnea, found to have COVID 7/31. Transferred to [facility], requiring intubation 8/5. +COVID. Per ICU, pt was at [facility] on BIPAP x 5 days; presumed NPO status. TF initiated 8/7, was advancing towards goal. S/p extubation 8/11. SLP following, last eval 8/19, advanced PO diet regular, thin liquids. Will add oral supplements to increase calorie/protein intake opportunity and continue to monitor PO intake s/p extubation. Pt seen for follow up, awake and oriented. Since last RD visit (8/15) pt appetite has increased to baseline. Per pt consuming between 50-75% of meals, 100% of gelatine and states magic cup is not being received. Pt denies any GI issues. Pt had no nutrition questions/concerns at this time. Will continue to monitor per protocol.      Nutrition Related Findings:    Pertinent Meds: Eliquis, Folvite, Lasix, Lantus, Humalog, Glycolax, Culturelle, Thiamine  Last BM: 8/16/24. Edema: Generalized +2, BUE +2, BLE +1       Pertinent Labs:   Recent Labs     08/18/24  0240 08/19/24  0331   GLUCOSE 153* 124*   BUN 25* 27*   CREATININE 1.15 1.09    139   K 4.5 4.5    104   CO2 31 32   CALCIUM 8.6 8.0*   PHOS 2.9 2.5   MG 2.2 1.9       Skin: Wound Type: Skin Tears    Current Nutrition Intake & Therapies:    Average Meal Intake: 51-75%  Average Supplements Intake:

## 2024-08-19 NOTE — PLAN OF CARE
Problem: Occupational Therapy - Adult  Goal: By Discharge: Performs self-care activities at highest level of function for planned discharge setting.  See evaluation for individualized goals.  Description: Occupational Therapy Goals:  Initiated 8/12/2024 to be met within 7-10 days.    1.  Patient will perform bed mobility in preparation for ADLs with moderate assistance.   2.  Patient will perform grooming with supervision/set-up.  3.  Patient will perform self-feeding with modified independence following set-up.  4.  Patient will perform upper body dressing with minimal assistance/contact guard assist.  5.  Patient will perform functional activity sitting EOB for approx. 5 minutes with supervision/set-up, F+ balance in preparation for progression to standing goals.  6.  Patient will participate in upper extremity therapeutic exercise/activities with supervision/set-up for 8-10 minutes to increase strength/endurance for ADLs.    7.  Patient will utilize energy conservation techniques during functional activities with verbal cues.        PLOF: Pt reports she needed assist with self-care and used a rollator for functional mobility.  Outcome: Progressing     OCCUPATIONAL THERAPY RE-EVALUATION    Patient: Jasmin Pacheco (78 y.o. female)  Date: 8/19/2024  Primary Diagnosis: Acute respiratory failure with hypoxia and hypercarbia (HCC) [J96.01, J96.02]       Precautions: Fall Risk, Contact Precautions (droplet plus),      ASSESSMENT :  Pt seen for OT 7 day re-evaluation. Pt pleasant and cooperative. Pt has severely limited rachell shoulder flexion; BUE strength grossly 3/5. Pt performed supine>sit EOB max A x2. Pt w/ poor static sitting balance w/ strong R lateral lean; able to sit EOB for several minutes w/ constant support. Sit>supine max A x2. Max A x2 to scoot toward HOB in supine. Pt would benefit from continued OT treatment to increase independence and safety w/ ADLs and functional mobility. Pt left semi supine, all needs  BUE  Tone: Normal  Sensation: Intact    Range of Motion: BUE  AROM: Generally decreased, functional (severly limited rachell shoulder flexion)  PROM: Generally decreased, functional    Functional Mobility and Transfers for ADLs:  Bed Mobility:  Bed Mobility Training  Bed Mobility Training: Yes  Rolling: Moderate assistance  Supine to Sit: Maximum assistance;Assist X2  Sit to Supine: Maximum assistance;Assist X2  Scooting: Maximum assistance    ADL Assessment:   Feeding: Setup  Grooming: Setup  UE Bathing: Maximum assistance  LE Bathing: Dependent/Total  UE Dressing: Maximum assistance  LE Dressing: Dependent/Total  Toileting: Dependent/Total    ADL Intervention:  Bed mobility and static sitting balance in preparation for seated ADLs.     Pain:  Intensity Pre-treatment: 0/10   Intensity Post-treatment: 0/10  Scale: Numeric Rating Scale    Activity Tolerance:   Activity Tolerance: Patient Tolerated treatment well  Please refer to the flowsheet for vital signs taken during this treatment.    After treatment:   [] Patient left in no apparent distress sitting up in chair  [x] Patient left in no apparent distress in bed  [x] Call bell left within reach  [] Nursing notified  [] Caregiver present  [] Bed alarm activated    COMMUNICATION/EDUCATION:   Patient Education  Education Given To: Patient  Education Provided: Role of Therapy;Plan of Care;ADL Adaptive Strategies;Energy Conservation  Education Method: Verbal;Teach Back  Barriers to Learning: None  Education Outcome: Continued education needed;Verbalized understanding    Thank you for this referral.  Grady Dudley OT  Minutes: 21

## 2024-08-19 NOTE — PLAN OF CARE
Problem: Physical Therapy - Adult  Goal: By Discharge: Performs mobility at highest level of function for planned discharge setting.  See evaluation for individualized goals.  Description: Physical Therapy Goals  Re-evaluated 8/19/2024 and updated below.  Initiated 8/12/2024 and to be accomplished within 7 day(s)  1.  Patient will move from supine to sit and sit to supine in bed with moderate assistance.    2.  Patient will transfer from bed to chair and chair to bed with moderate assistance using the least restrictive device.  3.  Patient will perform sit to stand with moderate assistance.  4.  Patient will ambulate with moderate assistance for 5 feet with the least restrictive device.   5.  Patient will maintain seated EOB 8 minutes with supervision.    PLOF: Lives with sons. One story home with 2 steps and ramp entry. Amb with rollator. Sleeps in recliner.   Outcome: Progressing   PHYSICAL THERAPY RE-EVALUATION    Patient: Jasmin Pacheco (78 y.o. female)  Date: 8/19/2024  Primary Diagnosis: Acute respiratory failure with hypoxia and hypercarbia (HCC) [J96.01, J96.02]  Precautions: Fall Risk, Contact Precautions (droplet plus)  ASSESSMENT :  Re-evaluation s/p prolonged admission; goals reviewed and updated as indicated. Droplet plus isolation. On 5L O2 via nasal cannula. Max A x2 for supine to sit. Seated EOB with mod/max for balance and midline. Preference to lean to right cues for midline. Able to tolerate midline for a few seconds and prefers to support on right side. Bariatric bed type limiting patient independence in seated position d/t safety concerns. Returned to supine in bed. Mod A for rolling. Positioned with HOB elevated. Educated on need for RN assistance with mobility; verbalized understanding. Call bell in reach.     DEFICITS/IMPAIRMENTS:   Body Structures, Functions, Activity Limitations Requiring Skilled Therapeutic Intervention: Decreased functional mobility ;Decreased safe awareness;Decreased  cognition;Decreased balance;Decreased strength;Decreased ROM;Decreased endurance    Patient will benefit from skilled intervention to address the above impairments.  Patient's rehabilitation potential: Therapy Prognosis: Fair.  Factors which may influence rehabilitation potential include:   []         None noted  []         Mental ability/status  [x]         Medical condition  []         Home/family situation and support systems  []         Safety awareness  []         Pain tolerance/management  []         Other:      PLAN :  Recommendations and Planned Interventions:   [x]           Bed Mobility Training             [x]    Neuromuscular Re-Education  [x]           Transfer Training                   []    Orthotic/Prosthetic Training  [x]           Gait Training                          []    Modalities  [x]           Therapeutic Exercises           []    Edema Management/Control  [x]           Therapeutic Activities            [x]    Family Training/Education  [x]           Patient Education  []           Other (comment):    Frequency/Duration: Patient will be followed by physical therapy 1-2 times per day/3-5 days per week to address goals.    Further Equipment Recommendations for Discharge: hospital bed and wheelchair (bariatric)    AM-PAC Inpatient Mobility Raw Score : 12    This [facility] score should be considered in conjunction with interdisciplinary team recommendations to determine the most appropriate discharge setting. Patient's social support, diagnosis, medical stability, and prior level of function should also be taken into consideration.     Current research shows that an AM-PAC score of 17 (13 without stairs) or less is not associated with a discharge to the patient's home setting.     SUBJECTIVE:   Patient stated “Was my oxygen okay?”    OBJECTIVE DATA SUMMARY:     Past Medical History:   Diagnosis Date    CHF (congestive heart failure) (HCC)     Diabetes (HCC)     Hyperlipemia     Sleep apnea

## 2024-08-19 NOTE — PLAN OF CARE
Problem: Safety - Adult  Goal: Free from fall injury  8/19/2024 1436 by Genoveva Castle RN  Outcome: Progressing  8/19/2024 0319 by Duke Acosta RN  Outcome: Progressing     Problem: Chronic Conditions and Co-morbidities  Goal: Patient's chronic conditions and co-morbidity symptoms are monitored and maintained or improved  8/19/2024 1436 by Genoveva Castle RN  Outcome: Progressing  Flowsheets (Taken 8/19/2024 0820)  Care Plan - Patient's Chronic Conditions and Co-Morbidity Symptoms are Monitored and Maintained or Improved: Monitor and assess patient's chronic conditions and comorbid symptoms for stability, deterioration, or improvement  8/19/2024 0319 by Duke Acosta RN  Outcome: Progressing     Problem: Discharge Planning  Goal: Discharge to home or other facility with appropriate resources  8/19/2024 1436 by Genoveva Castle RN  Outcome: Progressing  Flowsheets (Taken 8/19/2024 0820)  Discharge to home or other facility with appropriate resources:   Identify barriers to discharge with patient and caregiver   Arrange for needed discharge resources and transportation as appropriate  8/19/2024 0319 by Duke Acosta RN  Outcome: Progressing     Problem: Pain  Goal: Verbalizes/displays adequate comfort level or baseline comfort level  8/19/2024 1436 by Genoveva Castle RN  Outcome: Progressing  Flowsheets  Taken 8/19/2024 1108  Verbalizes/displays adequate comfort level or baseline comfort level:   Encourage patient to monitor pain and request assistance   Assess pain using appropriate pain scale  Taken 8/19/2024 0820  Verbalizes/displays adequate comfort level or baseline comfort level:   Encourage patient to monitor pain and request assistance   Assess pain using appropriate pain scale  8/19/2024 0319 by Duke Acosta RN  Outcome: Progressing     Problem: ABCDS Injury Assessment  Goal: Absence of physical injury  8/19/2024 1436 by Genoveva Castle RN  Outcome: Progressing  8/19/2024 0319 by Duke Acosta

## 2024-08-20 ENCOUNTER — APPOINTMENT (OUTPATIENT)
Facility: HOSPITAL | Age: 79
DRG: 870 | End: 2024-08-20
Attending: INTERNAL MEDICINE
Payer: MEDICARE

## 2024-08-20 LAB
ANION GAP SERPL CALC-SCNC: 4 MMOL/L (ref 3–18)
BASOPHILS # BLD: 0 K/UL (ref 0–0.1)
BASOPHILS NFR BLD: 0 % (ref 0–2)
BUN SERPL-MCNC: 25 MG/DL (ref 7–18)
BUN/CREAT SERPL: 21 (ref 12–20)
CALCIUM SERPL-MCNC: 8.2 MG/DL (ref 8.5–10.1)
CHLORIDE SERPL-SCNC: 102 MMOL/L (ref 100–111)
CO2 SERPL-SCNC: 32 MMOL/L (ref 21–32)
CREAT SERPL-MCNC: 1.18 MG/DL (ref 0.6–1.3)
DIFFERENTIAL METHOD BLD: ABNORMAL
EOSINOPHIL # BLD: 0.3 K/UL (ref 0–0.4)
EOSINOPHIL NFR BLD: 2 % (ref 0–5)
ERYTHROCYTE [DISTWIDTH] IN BLOOD BY AUTOMATED COUNT: 16.6 % (ref 11.6–14.5)
GLUCOSE BLD STRIP.AUTO-MCNC: 122 MG/DL (ref 70–110)
GLUCOSE BLD STRIP.AUTO-MCNC: 189 MG/DL (ref 70–110)
GLUCOSE BLD STRIP.AUTO-MCNC: 249 MG/DL (ref 70–110)
GLUCOSE BLD STRIP.AUTO-MCNC: 303 MG/DL (ref 70–110)
GLUCOSE SERPL-MCNC: 142 MG/DL (ref 74–99)
HCT VFR BLD AUTO: 26.8 % (ref 35–45)
HGB BLD-MCNC: 8.4 G/DL (ref 12–16)
IMM GRANULOCYTES # BLD AUTO: 0.1 K/UL (ref 0–0.04)
IMM GRANULOCYTES NFR BLD AUTO: 1 % (ref 0–0.5)
LYMPHOCYTES # BLD: 1.7 K/UL (ref 0.9–3.6)
LYMPHOCYTES NFR BLD: 12 % (ref 21–52)
MAGNESIUM SERPL-MCNC: 1.8 MG/DL (ref 1.6–2.6)
MCH RBC QN AUTO: 28.9 PG (ref 24–34)
MCHC RBC AUTO-ENTMCNC: 31.3 G/DL (ref 31–37)
MCV RBC AUTO: 92.1 FL (ref 78–100)
MONOCYTES # BLD: 0.9 K/UL (ref 0.05–1.2)
MONOCYTES NFR BLD: 6 % (ref 3–10)
NEUTS SEG # BLD: 11.3 K/UL (ref 1.8–8)
NEUTS SEG NFR BLD: 79 % (ref 40–73)
NRBC # BLD: 0.03 K/UL (ref 0–0.01)
NRBC BLD-RTO: 0.2 PER 100 WBC
PHOSPHATE SERPL-MCNC: 2.8 MG/DL (ref 2.5–4.9)
PLATELET # BLD AUTO: 357 K/UL (ref 135–420)
PMV BLD AUTO: 10.3 FL (ref 9.2–11.8)
POTASSIUM SERPL-SCNC: 4.5 MMOL/L (ref 3.5–5.5)
RBC # BLD AUTO: 2.91 M/UL (ref 4.2–5.3)
SODIUM SERPL-SCNC: 138 MMOL/L (ref 136–145)
WBC # BLD AUTO: 14.3 K/UL (ref 4.6–13.2)

## 2024-08-20 PROCEDURE — 94669 MECHANICAL CHEST WALL OSCILL: CPT

## 2024-08-20 PROCEDURE — 6370000000 HC RX 637 (ALT 250 FOR IP): Performed by: STUDENT IN AN ORGANIZED HEALTH CARE EDUCATION/TRAINING PROGRAM

## 2024-08-20 PROCEDURE — 94668 MNPJ CHEST WALL SBSQ: CPT

## 2024-08-20 PROCEDURE — 80048 BASIC METABOLIC PNL TOTAL CA: CPT

## 2024-08-20 PROCEDURE — 82962 GLUCOSE BLOOD TEST: CPT

## 2024-08-20 PROCEDURE — 94664 DEMO&/EVAL PT USE INHALER: CPT

## 2024-08-20 PROCEDURE — 6360000002 HC RX W HCPCS: Performed by: NURSE PRACTITIONER

## 2024-08-20 PROCEDURE — 6360000002 HC RX W HCPCS: Performed by: INTERNAL MEDICINE

## 2024-08-20 PROCEDURE — 83735 ASSAY OF MAGNESIUM: CPT

## 2024-08-20 PROCEDURE — 99232 SBSQ HOSP IP/OBS MODERATE 35: CPT | Performed by: INTERNAL MEDICINE

## 2024-08-20 PROCEDURE — 36415 COLL VENOUS BLD VENIPUNCTURE: CPT

## 2024-08-20 PROCEDURE — 2700000000 HC OXYGEN THERAPY PER DAY

## 2024-08-20 PROCEDURE — 2580000003 HC RX 258: Performed by: INTERNAL MEDICINE

## 2024-08-20 PROCEDURE — 85025 COMPLETE CBC W/AUTO DIFF WBC: CPT

## 2024-08-20 PROCEDURE — 94761 N-INVAS EAR/PLS OXIMETRY MLT: CPT

## 2024-08-20 PROCEDURE — 6370000000 HC RX 637 (ALT 250 FOR IP): Performed by: INTERNAL MEDICINE

## 2024-08-20 PROCEDURE — 71045 X-RAY EXAM CHEST 1 VIEW: CPT

## 2024-08-20 PROCEDURE — 94640 AIRWAY INHALATION TREATMENT: CPT

## 2024-08-20 PROCEDURE — 1100000000 HC RM PRIVATE

## 2024-08-20 PROCEDURE — 84100 ASSAY OF PHOSPHORUS: CPT

## 2024-08-20 PROCEDURE — 92526 ORAL FUNCTION THERAPY: CPT

## 2024-08-20 PROCEDURE — 99232 SBSQ HOSP IP/OBS MODERATE 35: CPT | Performed by: FAMILY MEDICINE

## 2024-08-20 PROCEDURE — 71250 CT THORAX DX C-: CPT

## 2024-08-20 PROCEDURE — 6360000002 HC RX W HCPCS: Performed by: PHYSICIAN ASSISTANT

## 2024-08-20 PROCEDURE — 94660 CPAP INITIATION&MGMT: CPT

## 2024-08-20 PROCEDURE — 6370000000 HC RX 637 (ALT 250 FOR IP): Performed by: NURSE PRACTITIONER

## 2024-08-20 RX ORDER — ACETYLCYSTEINE 100 MG/ML
4 SOLUTION ORAL; RESPIRATORY (INHALATION)
Status: DISCONTINUED | OUTPATIENT
Start: 2024-08-20 | End: 2024-08-24 | Stop reason: HOSPADM

## 2024-08-20 RX ADMIN — ALBUTEROL SULFATE 2.5 MG: 2.5 SOLUTION RESPIRATORY (INHALATION) at 15:05

## 2024-08-20 RX ADMIN — FUROSEMIDE 40 MG: 40 TABLET ORAL at 09:08

## 2024-08-20 RX ADMIN — SODIUM CHLORIDE SOLN NEBU 3% 4 ML: 3 NEBU SOLN at 01:00

## 2024-08-20 RX ADMIN — INSULIN LISPRO 4 UNITS: 100 INJECTION, SOLUTION INTRAVENOUS; SUBCUTANEOUS at 21:57

## 2024-08-20 RX ADMIN — GUAIFENESIN 600 MG: 600 TABLET, EXTENDED RELEASE ORAL at 21:58

## 2024-08-20 RX ADMIN — SODIUM CHLORIDE SOLN NEBU 3% 4 ML: 3 NEBU SOLN at 11:56

## 2024-08-20 RX ADMIN — Medication 1 CAPSULE: at 09:08

## 2024-08-20 RX ADMIN — AMLODIPINE BESYLATE 5 MG: 5 TABLET ORAL at 09:08

## 2024-08-20 RX ADMIN — ALBUTEROL SULFATE 2.5 MG: 2.5 SOLUTION RESPIRATORY (INHALATION) at 01:00

## 2024-08-20 RX ADMIN — INSULIN GLARGINE 7 UNITS: 100 INJECTION, SOLUTION SUBCUTANEOUS at 09:08

## 2024-08-20 RX ADMIN — FOLIC ACID 1 MG: 1 TABLET ORAL at 09:08

## 2024-08-20 RX ADMIN — ALBUTEROL SULFATE 2.5 MG: 2.5 SOLUTION RESPIRATORY (INHALATION) at 11:56

## 2024-08-20 RX ADMIN — SODIUM CHLORIDE, PRESERVATIVE FREE 10 ML: 5 INJECTION INTRAVENOUS at 09:09

## 2024-08-20 RX ADMIN — ALBUTEROL SULFATE 2.5 MG: 2.5 SOLUTION RESPIRATORY (INHALATION) at 21:42

## 2024-08-20 RX ADMIN — ALBUTEROL SULFATE 2.5 MG: 2.5 SOLUTION RESPIRATORY (INHALATION) at 08:30

## 2024-08-20 RX ADMIN — ACETYLCYSTEINE 400 MG: 100 SOLUTION ORAL; RESPIRATORY (INHALATION) at 21:42

## 2024-08-20 RX ADMIN — INSULIN LISPRO 12 UNITS: 100 INJECTION, SOLUTION INTRAVENOUS; SUBCUTANEOUS at 12:03

## 2024-08-20 RX ADMIN — THIAMINE HYDROCHLORIDE 200 MG: 100 INJECTION, SOLUTION INTRAMUSCULAR; INTRAVENOUS at 14:59

## 2024-08-20 RX ADMIN — GUAIFENESIN 600 MG: 600 TABLET, EXTENDED RELEASE ORAL at 09:08

## 2024-08-20 RX ADMIN — SODIUM CHLORIDE SOLN NEBU 3% 4 ML: 3 NEBU SOLN at 08:30

## 2024-08-20 RX ADMIN — ACETYLCYSTEINE 400 MG: 100 SOLUTION ORAL; RESPIRATORY (INHALATION) at 11:56

## 2024-08-20 RX ADMIN — SODIUM CHLORIDE SOLN NEBU 3% 4 ML: 3 NEBU SOLN at 03:55

## 2024-08-20 RX ADMIN — SODIUM CHLORIDE, PRESERVATIVE FREE 10 ML: 5 INJECTION INTRAVENOUS at 21:58

## 2024-08-20 RX ADMIN — ALBUTEROL SULFATE 2.5 MG: 2.5 SOLUTION RESPIRATORY (INHALATION) at 03:55

## 2024-08-20 RX ADMIN — APIXABAN 5 MG: 5 TABLET, FILM COATED ORAL at 09:08

## 2024-08-20 RX ADMIN — LORAZEPAM 2 MG: 2 INJECTION INTRAMUSCULAR; INTRAVENOUS at 13:31

## 2024-08-20 RX ADMIN — THIAMINE HYDROCHLORIDE 200 MG: 100 INJECTION, SOLUTION INTRAMUSCULAR; INTRAVENOUS at 09:08

## 2024-08-20 RX ADMIN — THIAMINE HYDROCHLORIDE 200 MG: 100 INJECTION, SOLUTION INTRAMUSCULAR; INTRAVENOUS at 21:58

## 2024-08-20 ASSESSMENT — PAIN SCALES - GENERAL
PAINLEVEL_OUTOF10: 0

## 2024-08-20 NOTE — PROGRESS NOTES
Assumed care of patient from VINAYAK Cole (off going nurse. Patient alert and oriented. No apparent distre)ss noted. Patient offers no concerns and can verbalize needs. Assessment to follow. Call bell within reach. Bed in lowest, locked position.

## 2024-08-20 NOTE — PROGRESS NOTES
Add mucomyst q4hrs.  Obtain CT chest w/o contrast  Recc keep off Right side  Continue diuretic regimen- on Lasix 40 mg daily - suspect hypervolemia likely etiology of persistent hypoxia / need for supplemental O2  S/p Prednisone x5 day course  Abx - defer to primary service  Bronchodilators - prn  Please assess for home oxygen need prior to discharge  Aggressive pulmonary toileting/bronchial hygiene  Frequent incentive spirometry and flutter valve - encourage use  Aspiration precautions including elevating HOB >30deg  PT/OT, OOB, ambulate with assistance as tolerated  DVT ppx per primary service  May discontinue Droplet plus precautions. Patient has been in isolation since dx on . Current symptoms due to mucus plugging; not likely active COVID-19 viral infection.     Pulmonary will continue to follow     Subjective/Interval History:   2024:  Patient seen and examined at bedside this morning.  No acute events overnight.  Tolerated NIPPV overnight.  Now on supplemental oxygen at 4 L/min.  Notes cough last night with yellow phlegm production.  No fevers, chills, chest pain or abdominal distress.    ROS:Pertinent items are noted in HPI.    Objective:   Vital Signs:    /70   Pulse (!) 106   Temp 98.6 °F (37 °C) (Oral)   Resp 28   Ht 1.702 m (5' 7\")   Wt 136 kg (299 lb 13.2 oz)   SpO2 92%   BMI 46.96 kg/m²             Temp (24hrs), Av.6 °F (37 °C), Min:98.1 °F (36.7 °C), Max:98.8 °F (37.1 °C)       Intake/Output:   Last shift:       07 -  190  In: 240 [P.O.:240]  Out: 400 [Urine:400]  Last 3 shifts:  1901 -  0700  In: 1200 [P.O.:1200]  Out: 3200 [Urine:3200]    Intake/Output Summary (Last 24 hours) at 2024 1202  Last data filed at 2024 0911  Gross per 24 hour   Intake 1080 ml   Output 3600 ml   Net -2520 ml        Physical Exam:    General: in no apparent distress, alert, and oriented times 3   HEENT: PERRLA, EOMI, fundi benign   Neck: No abnormally enlarged lymph  nodes.   Lungs: Decreased air movement over right lung fields.  No wheezes.  No tachypnea accessory muscle use.   Heart: Regular rate and rhythm or without murmur or extra heart sounds   Abdomen: abdomen is soft without significant tenderness, masses, organomegaly or guarding   Extremity: No edema, cyanosis or clubbing.   Neuro: alert, grossly nonfocal        DATA:  Labs:  Recent Labs     08/18/24  0240 08/19/24  0331 08/20/24  0142   WBC 14.3* 13.0 14.3*   HGB 8.7* 8.5* 8.4*   HCT 28.5* 27.5* 26.8*    378 357     Recent Labs     08/18/24  0240 08/19/24  0331 08/20/24  0142    139 138   K 4.5 4.5 4.5    104 102   CO2 31 32 32   BUN 25* 27* 25*   MG 2.2 1.9 1.8   PHOS 2.9 2.5 2.8     No results for input(s): \"PH\", \"PCO2\", \"PO2\", \"HCO3\", \"FIO2\" in the last 72 hours.                                                Echo:   08/05/24    ECHO (TTE) COMPLETE (PRN CONTRAST/BUBBLE/STRAIN/3D) 08/06/2024  3:20 PM (Final)    Interpretation Summary    Image quality is technically difficult. Contrast used: Definity. Technically difficult study with poor endocardial visualization, limited Doppler study due to patient's condition and procedure performed with the patient in a sitting position.  Patient ventilated.    Left Ventricle: Normal left ventricular systolic function with a visually estimated EF of 55 - 60%. EF by 2D Simpsons Biplane is 58%. Left ventricle size is normal. Unable to assess wall thickness. No obvious regional wall motion abnormalities. Grade II diastolic dysfunction with increased LAP.    Right Ventricle: Not well visualized. Right ventricle is dilated.  Significantly dilated by visual estimate.    Tricuspid Valve: Valve structure is normal. Mild regurgitation. No stenosis noted. Unable to assess RVSP.  Patient ventilated.  Moderate pulmonary hypertension likely present.    Right Atrium: Not well visualized. Right atrium is dilated by visual estimate.    Signed by: Jaleel Delgado MD on

## 2024-08-20 NOTE — CARE COORDINATION
[facility].    1:35 pm    SW ordered the patient hospital bed, Mechanical Lift and and Wheel chair via edulio.        Elier Rivas, DARNELL       [phone]   [FreeTextEntry1] : I had the pleasure of seeing your patient, YAKOV GUERRA , at the pediatric cardiology clinic of [facility] on January 6, 2023. 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 Ronak Waldron and noted to have additional muscular VSDs. 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 Feeds were fortified to 24cal per Speech recommendations with neosure and use of preemie nipple and external pacing.  She was discharged home on 9/5. \par \par Readmitted on 9/22-30/21 for feeding issues and diagnosed with milk protein allergy so switched to alimentum 27cal/oz and eventually discharged home with NG supplementation for feeds. \par \par Readmitted for bronchiolitis 10/3-10/8/21. \par \par Readmitted 10/19-11/19/21 for dehydration, vomiting, respiratory failure from viral illness and continued feeding issues and failure to thrive. 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 HUS normal. Negative for digeorge. \par \par 9/29/21 PRIOR MODIFIED BARIUM SWALLOW STUDY RESULTS: "Patient presents with moderate oral dysphagia and mild pharyngeal dysphagia. NO penetration/aspiration/residue viewed for Formula dense fluids via Dr. Huerta's Specialty Feeder with Preemie nipple and slightly thick fluids via Dr. Huerta's Level 1 nipple. Recommend to continue oral feeds of EHBM/Formula dense fluids via Dr. Huerta's Specialty Feeder with Preemie nipple as tolerated by patient with remainder non-oral means of nutrition/hydration per MD.".  \par \par She was discharged from [facility] 2021 . Echos had not well visualized the branch PAs for some time, however CT was recommended to be done closer to her next surgery. After that visit she presented to the ED on 1/10/2022 with hypoxia to the 50s and diagnosed with Covid bronchiolitis and was ultimately intubated & received remdisevir and decadron . She continued having acute desaturations and sedated echocardiogram showed multiple muscular VSDs which would be difficult to close and the RV appeared somewhat hypoplastic possibly due to the intense hypertrophy, the PA band to be extremely tight and limited flow into the branch PAs, this was confirmed on cardiac CT. She was emergently taken to the OR on 1/20/2022 for acute cyanosis and bradycardia followed by chest compressions with ROSC in 4 minutes and underwent a right bidirectional Carl and PA band was left. Postoperative course was unremarkable.During her admission she was cleared for thickened feeds by Speech.  \par \par Parents expressed interest in pursuing a 2nd opinion regarding biventricular conversion and closure of the VSDs so her information was sent to [place]'s Biventricle Conversion team. However insurance would not cover out of state so her information was sent to [facility] who accepted the case. She is s/p 1.5 ventricle repair. MPA band takedown on 12/5/2022 by Dr. Kevin Lake. Supra PS, with patch plasty of distal MPA. Commisurroplasty and patch augmentation of each PV sinus. VSD closed with corematrix patch down to apex of LV to allow for more RV volume. Additional muscular VSD primarily closed. CBypass 203 min: CXclamp 75 min .Postoperative course was complicated by complete heartblock, however she regained sinus rhythm by discharge. She was discharged home on 8mg BID lasix on 12/13/2022. \par \par She was weaned off Flecainide with no recurrence of her SVT. She was seen by ENT in july and noted to have continued LVCP but the right cord looked good. \par \par Prior to surgery she was about to start an on an intensive feeding program and had started some solids but since her last surgery she is refusing anything by mouth.  She feeds via GTube with bolus feeds 4 times a day with High Fidelity 195ml.  She has developmental delay but needs to get reestablished with early intervention. She does not appear to be followed by the cardiac neurodevelopmental team but she has developmental concerns from your office which include delayed fine motor skills, delayed gross motor skills, delayed language skills and delayed problem solving skills. \par \par She was last seen in my office last month and noted to have bilateral pleural effusions and facial swelling so she was readmitted to [facility] twice because of this and most recently discharged last weekend after IV diuresis. She was discharged home a week ago on Lasix 10mg PO TID and Diuril 125mg once daily. She was also told she would get spironolactone once daily but it was not sent to the pharmacy. There were issues with prior authorization so mom did not get to start the diuril until 2 days ago. Yakov started having facial swelling again the day after discharge and it improved with starting the diuril but was starting again this morning. \par \par Since discharge home she has been feeding fine and not tachypneic. She has been afebrile and not irritable.She presents for followup of her pleural effusions and carl. \par \par  \par

## 2024-08-20 NOTE — PROGRESS NOTES
[facility]  Progress Note    Patient: Jasmin Pacheco Age: 78 y.o. : 1945 MR#: [id number] SSN: [id number]      Subjective/24-hour events:     No worsening SOB, remains on oxygen at 4 LPM as decreased yesterday.  Afebrile.    Assessment:   Acute hypoxic and hypercapnic respiratory failure  Acute on chronic diastolic CHF  COVID-19 pneumonia  Contraction alkalosis secondary to diuresis  DM2  Morbid obesity  Acute metabolic encephalopathy, resolved  History of PE, on anticoagulation with Eliquis    Plan:   Continue to monitor off of antibiotics.  Case discussed with Dr. Smart today.  Continued concern with opacification of right lung which is suspected to be due to mucous plugging.  Mucinex and MetaNeb w/ 3% saline continued.  Mucomyst has been added as well.  Plan is to repeat chest CT today with further plan pending results of the study.  Continue diuresis, monitor electrolytes and renal indices.  Therapy as tolerated.  Patient still wanting to go home at discharge.  Not amenable to SNF placement.    Anticipated discharge:  - [facility] v SNF    Case discussed with:  [x]Patient  []Family  [x] Nursing  [x]Case Management  DVT Prophylaxis:  []Lovenox  []Hep SQ  []SCDs  []Coumadin   []On Heparin gtt [x]PO anticoagulant    Objective:   VS: /70   Pulse 98   Temp 98.6 °F (37 °C) (Oral)   Resp 18   Ht 1.702 m (5' 7\")   Wt 136 kg (299 lb 13.2 oz)   SpO2 94%   BMI 46.96 kg/m²      Tmax/24hrs: Temp (24hrs), Av.6 °F (37 °C), Min:98.1 °F (36.7 °C), Max:98.8 °F (37.1 °C)    Intake/Output Summary (Last 24 hours) at 2024 1437  Last data filed at 2024 1341  Gross per 24 hour   Intake 1080 ml   Output 1800 ml   Net -720 ml       Gen:  In NAD.  Nontoxic-appearing.  Lungs: Clear, no wheezes  Effort nonlabored.  CV: RRR.  Abdomen: Soft, NTTP.  Extremities: Warm, no pitting edema or ischemia.  Neuro:  Awake and alert, moves extremities spontaneously.    Current  Facility-Administered Medications   Medication Dose Route Frequency    acetylcysteine (MUCOMYST) 10 % solution 400 mg  4 mL Inhalation Q4H RT    guaiFENesin (MUCINEX) extended release tablet 600 mg  600 mg Oral BID    sodium chloride (Inhalant) 3 % nebulizer solution 4 mL  4 mL Nebulization Q4H RT    And    albuterol (PROVENTIL) (2.5 MG/3ML) 0.083% nebulizer solution 2.5 mg  2.5 mg Nebulization Q4H RT    amLODIPine (NORVASC) tablet 5 mg  5 mg Oral Daily    furosemide (LASIX) tablet 40 mg  40 mg Oral Daily    insulin glargine (LANTUS) injection vial 7 Units  7 Units SubCUTAneous Daily    apixaban (ELIQUIS) tablet 5 mg  5 mg Oral BID    insulin lispro (HUMALOG,ADMELOG) injection vial 0-16 Units  0-16 Units SubCUTAneous 4x Daily AC & HS    lactobacillus (CULTURELLE) capsule 1 capsule  1 capsule Oral Daily with breakfast    LORazepam (ATIVAN) injection 2 mg  2 mg IntraVENous Q8H PRN    ipratropium 0.5 mg-albuterol 2.5 mg (DUONEB) nebulizer solution 1 Dose  1 Dose Inhalation Q4H PRN    phenol 1.4 % mouth spray 1 spray  1 spray Mouth/Throat Q2H PRN    polyethylene glycol (GLYCOLAX) packet 34 g  34 g Oral BID    glucose chewable tablet 16 g  4 tablet Oral PRN    dextrose bolus 10% 125 mL  125 mL IntraVENous PRN    Or    dextrose bolus 10% 250 mL  250 mL IntraVENous PRN    Glucagon Emergency SOLR 1 mg  1 mg SubCUTAneous PRN    dextrose 10 % infusion   IntraVENous Continuous PRN    thiamine (B-1) injection 200 mg  200 mg IntraVENous TID    folic acid (FOLVITE) tablet 1 mg  1 mg Oral Daily    sodium chloride flush 0.9 % injection 5-40 mL  5-40 mL IntraVENous 2 times per day    sodium chloride flush 0.9 % injection 5-40 mL  5-40 mL IntraVENous PRN    0.9 % sodium chloride infusion   IntraVENous PRN    potassium chloride 20 mEq/50 mL IVPB (Central Line)  20 mEq IntraVENous PRN    Or    potassium chloride 10 mEq/100 mL IVPB (Peripheral Line)  10 mEq IntraVENous PRN    magnesium sulfate 2000 mg in 50 mL IVPB premix  2,000 mg

## 2024-08-20 NOTE — PLAN OF CARE
Problem: Safety - Adult  Goal: Free from fall injury  8/20/2024 1348 by Lolita Griffin, RN  Outcome: Progressing  8/20/2024 0635 by Douglas Hawkins RN  Outcome: Progressing     Problem: Chronic Conditions and Co-morbidities  Goal: Patient's chronic conditions and co-morbidity symptoms are monitored and maintained or improved  Outcome: Progressing     Problem: Discharge Planning  Goal: Discharge to home or other facility with appropriate resources  Outcome: Progressing     Problem: Pain  Goal: Verbalizes/displays adequate comfort level or baseline comfort level  Outcome: Progressing     Problem: ABCDS Injury Assessment  Goal: Absence of physical injury  Outcome: Progressing     Problem: Nutrition Deficit:  Goal: Optimize nutritional status  Outcome: Progressing

## 2024-08-20 NOTE — PLAN OF CARE
Problem: Safety - Adult  Goal: Free from fall injury  8/20/2024 1358 by Lolita Griffin RN  Outcome: Progressing  8/20/2024 1348 by Lolita Griffin RN  Outcome: Progressing  8/20/2024 0635 by Douglas Hawkins RN  Outcome: Progressing     Problem: Chronic Conditions and Co-morbidities  Goal: Patient's chronic conditions and co-morbidity symptoms are monitored and maintained or improved  8/20/2024 1358 by Lolita Griffin RN  Outcome: Progressing  8/20/2024 1348 by Lolita Griffin RN  Outcome: Progressing     Problem: Discharge Planning  Goal: Discharge to home or other facility with appropriate resources  8/20/2024 1358 by Lolita Griffin RN  Outcome: Progressing  8/20/2024 1348 by Lolita Griffin RN  Outcome: Progressing     Problem: Pain  Goal: Verbalizes/displays adequate comfort level or baseline comfort level  8/20/2024 1358 by Lolita Griffin RN  Outcome: Progressing  8/20/2024 1348 by Lolita Griffin RN  Outcome: Progressing     Problem: ABCDS Injury Assessment  Goal: Absence of physical injury  8/20/2024 1358 by Lolita Griffin RN  Outcome: Progressing  8/20/2024 1348 by Lolita Griffin RN  Outcome: Progressing

## 2024-08-20 NOTE — PLAN OF CARE
provided.  [x]         Posted safety precautions in patient's room.    Thank you for this referral,     Janine Farr M.S., CF-SLP  Speech Language Pathologist

## 2024-08-20 NOTE — PROGRESS NOTES
Addendum:  CT chest reviewed. Remains on 4L/min. Findings concerning for mucus plugging +/- aspiration. If no improvement with pulmonary toilet, may require bronchoscopy. Eliquis placed on hold. NPO at 2200 tonight. Discussed with Endo, will have updated schedule/timing in Am. Recc SLP eval.  Discussed with primary service.       Haris Smart,    08/20/24  Pulmonary, Critical Care Medicine  [facility]

## 2024-08-20 NOTE — PROGRESS NOTES
1930hrs:  Bedside and Verbal shift change report given to VINAYAK Cole (oncoming nurse) by VINAYAK Tomas (offgoing nurse). Report included the following information Nurse Handoff Report, Index, ED Encounter Summary, Adult Overview, Surgery Report, Intake/Output, MAR, Recent Results, Cardiac Rhythm NSR/ST, Neuro Assessment, and Event Log.     Pt in bed, resting and watching TV.  No s/s distress noted.     0540hrs:  Pt requested to be switched from BiPAP to NC.      0730hrs:  Bedside and Verbal shift change report given to VINAYAK Mchugh (oncoming nurse) by VINAYAK Cole (offgoing nurse). Report included the following information Nurse Handoff Report, Index, ED Encounter Summary, Adult Overview, Surgery Report, Intake/Output, MAR, Recent Results, Cardiac Rhythm NSR, Neuro Assessment, and Event Log.

## 2024-08-20 NOTE — PROGRESS NOTES
TRANSFER - OUT REPORT:    Verbal report given to VINAYAK Fowler on Jasmin Pacheco  being transferred to [address] [place] [address] for routine progression of patient care       Report consisted of patient's Situation, Background, Assessment and   Recommendations(SBAR).     Information from the following report(s) Nurse Handoff Report, Intake/Output, and Cardiac Rhythm NSR  was reviewed with the receiving nurse.           Lines:   Peripheral IV 08/05/24 Distal;Right Forearm (Active)   Site Assessment Clean, dry & intact 08/20/24 1204   Line Status Capped;Flushed 08/20/24 1204   Line Care Cap changed;Connections checked and tightened 08/20/24 1204   Phlebitis Assessment No symptoms 08/20/24 1204   Infiltration Assessment 0 08/20/24 1204   Alcohol Cap Used Yes 08/20/24 1204   Dressing Status Clean, dry & intact 08/20/24 1204   Dressing Type Transparent 08/20/24 1204   Dressing Intervention New 08/20/24 1204        Opportunity for questions and clarification was provided.      Patient transported with:  O2 @ 4lpm

## 2024-08-20 NOTE — PROGRESS NOTES
4 Eyes Skin Assessment     NAME:  Jasmin Pacheco  YOB: 1945  MEDICAL RECORD NUMBER:  [id number]    The patient is being assessed for  Admission    I agree that at least one RN has performed a thorough Head to Toe Skin Assessment on the patient. ALL assessment sites listed below have been assessed.      Areas assessed by both nurses:    Head, Face, Ears, Shoulders, Back, Chest, Arms, Elbows, Hands, Sacrum. Buttock, Coccyx, Ischium, and Legs. Feet and Heels        Does the Patient have a Wound? Yes wound(s) were present on assessment. LDA wound assessment was Initiated and completed by RN       Renard Prevention initiated by RN: Yes  Wound Care Orders initiated by RN: No    Pressure Injury (Stage 3,4, Unstageable, DTI, NWPT, and Complex wounds) if present, place Wound referral order by RN under : No    New Ostomies, if present place, Ostomy referral order under : No     Nurse 1 eSignature: Electronically signed by Janeth Feldman RN on 8/20/24 at 7:19 PM EDT    **SHARE this note so that the co-signing nurse can place an eSignature**    Nurse 2 eSignature: Electronically signed by Jeny Davidson RN on 8/20/24 at 7:20 PM EDT

## 2024-08-21 ENCOUNTER — APPOINTMENT (OUTPATIENT)
Facility: HOSPITAL | Age: 79
DRG: 870 | End: 2024-08-21
Attending: INTERNAL MEDICINE
Payer: MEDICARE

## 2024-08-21 ENCOUNTER — ANESTHESIA EVENT (OUTPATIENT)
Facility: HOSPITAL | Age: 79
End: 2024-08-21
Payer: MEDICARE

## 2024-08-21 ENCOUNTER — ANESTHESIA (OUTPATIENT)
Facility: HOSPITAL | Age: 79
End: 2024-08-21
Payer: MEDICARE

## 2024-08-21 PROBLEM — R93.89 ABNORMAL CT OF THE CHEST: Status: ACTIVE | Noted: 2024-08-21

## 2024-08-21 LAB
ANION GAP SERPL CALC-SCNC: 6 MMOL/L (ref 3–18)
BASOPHILS # BLD: 0 K/UL (ref 0–0.1)
BASOPHILS NFR BLD: 0 % (ref 0–2)
BUN SERPL-MCNC: 18 MG/DL (ref 7–18)
BUN/CREAT SERPL: 16 (ref 12–20)
CALCIUM SERPL-MCNC: 8 MG/DL (ref 8.5–10.1)
CHLORIDE SERPL-SCNC: 101 MMOL/L (ref 100–111)
CO2 SERPL-SCNC: 31 MMOL/L (ref 21–32)
CREAT SERPL-MCNC: 1.15 MG/DL (ref 0.6–1.3)
CYTOLOGY-NON GYN: NORMAL
DIFFERENTIAL METHOD BLD: ABNORMAL
EOSINOPHIL # BLD: 0.3 K/UL (ref 0–0.4)
EOSINOPHIL NFR BLD: 2 % (ref 0–5)
ERYTHROCYTE [DISTWIDTH] IN BLOOD BY AUTOMATED COUNT: 17.2 % (ref 11.6–14.5)
GLUCOSE BLD STRIP.AUTO-MCNC: 170 MG/DL (ref 70–110)
GLUCOSE BLD STRIP.AUTO-MCNC: 178 MG/DL (ref 70–110)
GLUCOSE BLD STRIP.AUTO-MCNC: 191 MG/DL (ref 70–110)
GLUCOSE BLD STRIP.AUTO-MCNC: 195 MG/DL (ref 70–110)
GLUCOSE BLD STRIP.AUTO-MCNC: 206 MG/DL (ref 70–110)
GLUCOSE BLD STRIP.AUTO-MCNC: 209 MG/DL (ref 70–110)
GLUCOSE SERPL-MCNC: 138 MG/DL (ref 74–99)
HCT VFR BLD AUTO: 29 % (ref 35–45)
HGB BLD-MCNC: 8.8 G/DL (ref 12–16)
IMM GRANULOCYTES # BLD AUTO: 0.1 K/UL (ref 0–0.04)
IMM GRANULOCYTES NFR BLD AUTO: 1 % (ref 0–0.5)
LYMPHOCYTES # BLD: 1.7 K/UL (ref 0.9–3.6)
LYMPHOCYTES NFR BLD: 11 % (ref 21–52)
MAGNESIUM SERPL-MCNC: 2 MG/DL (ref 1.6–2.6)
MCH RBC QN AUTO: 28.9 PG (ref 24–34)
MCHC RBC AUTO-ENTMCNC: 30.3 G/DL (ref 31–37)
MCV RBC AUTO: 95.1 FL (ref 78–100)
MONOCYTES # BLD: 1.1 K/UL (ref 0.05–1.2)
MONOCYTES NFR BLD: 7 % (ref 3–10)
NEUTS SEG # BLD: 12.7 K/UL (ref 1.8–8)
NEUTS SEG NFR BLD: 80 % (ref 40–73)
NRBC # BLD: 0 K/UL (ref 0–0.01)
NRBC BLD-RTO: 0 PER 100 WBC
PHOSPHATE SERPL-MCNC: 2.9 MG/DL (ref 2.5–4.9)
PLATELET # BLD AUTO: 383 K/UL (ref 135–420)
PMV BLD AUTO: 10.1 FL (ref 9.2–11.8)
POTASSIUM SERPL-SCNC: 4.7 MMOL/L (ref 3.5–5.5)
RBC # BLD AUTO: 3.05 M/UL (ref 4.2–5.3)
SODIUM SERPL-SCNC: 138 MMOL/L (ref 136–145)
WBC # BLD AUTO: 15.8 K/UL (ref 4.6–13.2)

## 2024-08-21 PROCEDURE — 2580000003 HC RX 258: Performed by: ANESTHESIOLOGY

## 2024-08-21 PROCEDURE — 99232 SBSQ HOSP IP/OBS MODERATE 35: CPT | Performed by: INTERNAL MEDICINE

## 2024-08-21 PROCEDURE — 6370000000 HC RX 637 (ALT 250 FOR IP): Performed by: INTERNAL MEDICINE

## 2024-08-21 PROCEDURE — 6360000002 HC RX W HCPCS: Performed by: INTERNAL MEDICINE

## 2024-08-21 PROCEDURE — 71045 X-RAY EXAM CHEST 1 VIEW: CPT

## 2024-08-21 PROCEDURE — 87116 MYCOBACTERIA CULTURE: CPT

## 2024-08-21 PROCEDURE — 2580000003 HC RX 258: Performed by: INTERNAL MEDICINE

## 2024-08-21 PROCEDURE — 7100000000 HC PACU RECOVERY - FIRST 15 MIN: Performed by: INTERNAL MEDICINE

## 2024-08-21 PROCEDURE — 94640 AIRWAY INHALATION TREATMENT: CPT

## 2024-08-21 PROCEDURE — 6370000000 HC RX 637 (ALT 250 FOR IP): Performed by: NURSE PRACTITIONER

## 2024-08-21 PROCEDURE — 84100 ASSAY OF PHOSPHORUS: CPT

## 2024-08-21 PROCEDURE — 99232 SBSQ HOSP IP/OBS MODERATE 35: CPT | Performed by: STUDENT IN AN ORGANIZED HEALTH CARE EDUCATION/TRAINING PROGRAM

## 2024-08-21 PROCEDURE — 36415 COLL VENOUS BLD VENIPUNCTURE: CPT

## 2024-08-21 PROCEDURE — 3600007512: Performed by: INTERNAL MEDICINE

## 2024-08-21 PROCEDURE — 31622 DX BRONCHOSCOPE/WASH: CPT | Performed by: INTERNAL MEDICINE

## 2024-08-21 PROCEDURE — 2709999900 HC NON-CHARGEABLE SUPPLY: Performed by: INTERNAL MEDICINE

## 2024-08-21 PROCEDURE — 6370000000 HC RX 637 (ALT 250 FOR IP): Performed by: NURSE ANESTHETIST, CERTIFIED REGISTERED

## 2024-08-21 PROCEDURE — 6360000002 HC RX W HCPCS: Performed by: NURSE PRACTITIONER

## 2024-08-21 PROCEDURE — 87102 FUNGUS ISOLATION CULTURE: CPT

## 2024-08-21 PROCEDURE — 3700000000 HC ANESTHESIA ATTENDED CARE: Performed by: INTERNAL MEDICINE

## 2024-08-21 PROCEDURE — 80048 BASIC METABOLIC PNL TOTAL CA: CPT

## 2024-08-21 PROCEDURE — 6360000002 HC RX W HCPCS: Performed by: NURSE ANESTHETIST, CERTIFIED REGISTERED

## 2024-08-21 PROCEDURE — 85025 COMPLETE CBC W/AUTO DIFF WBC: CPT

## 2024-08-21 PROCEDURE — 6370000000 HC RX 637 (ALT 250 FOR IP): Performed by: PHYSICIAN ASSISTANT

## 2024-08-21 PROCEDURE — 3700000001 HC ADD 15 MINUTES (ANESTHESIA): Performed by: INTERNAL MEDICINE

## 2024-08-21 PROCEDURE — 1100000000 HC RM PRIVATE

## 2024-08-21 PROCEDURE — 6370000000 HC RX 637 (ALT 250 FOR IP): Performed by: ANESTHESIOLOGY

## 2024-08-21 PROCEDURE — 87206 SMEAR FLUORESCENT/ACID STAI: CPT

## 2024-08-21 PROCEDURE — 82962 GLUCOSE BLOOD TEST: CPT

## 2024-08-21 PROCEDURE — 83735 ASSAY OF MAGNESIUM: CPT

## 2024-08-21 PROCEDURE — 87106 FUNGI IDENTIFICATION YEAST: CPT

## 2024-08-21 PROCEDURE — 6370000000 HC RX 637 (ALT 250 FOR IP): Performed by: STUDENT IN AN ORGANIZED HEALTH CARE EDUCATION/TRAINING PROGRAM

## 2024-08-21 PROCEDURE — 2500000003 HC RX 250 WO HCPCS: Performed by: NURSE ANESTHETIST, CERTIFIED REGISTERED

## 2024-08-21 PROCEDURE — 87070 CULTURE OTHR SPECIMN AEROBIC: CPT

## 2024-08-21 PROCEDURE — 88112 CYTOPATH CELL ENHANCE TECH: CPT

## 2024-08-21 PROCEDURE — 94668 MNPJ CHEST WALL SBSQ: CPT

## 2024-08-21 PROCEDURE — 3600007502: Performed by: INTERNAL MEDICINE

## 2024-08-21 PROCEDURE — 87205 SMEAR GRAM STAIN: CPT

## 2024-08-21 PROCEDURE — 7100000001 HC PACU RECOVERY - ADDTL 15 MIN: Performed by: INTERNAL MEDICINE

## 2024-08-21 PROCEDURE — 94660 CPAP INITIATION&MGMT: CPT

## 2024-08-21 RX ORDER — LIDOCAINE HYDROCHLORIDE 20 MG/ML
INJECTION, SOLUTION EPIDURAL; INFILTRATION; INTRACAUDAL; PERINEURAL PRN
Status: DISCONTINUED | OUTPATIENT
Start: 2024-08-21 | End: 2024-08-21 | Stop reason: SDUPTHER

## 2024-08-21 RX ORDER — ALBUTEROL SULFATE 0.83 MG/ML
2.5 SOLUTION RESPIRATORY (INHALATION)
Status: DISCONTINUED | OUTPATIENT
Start: 2024-08-21 | End: 2024-08-24 | Stop reason: HOSPADM

## 2024-08-21 RX ORDER — DEXTROSE MONOHYDRATE 100 MG/ML
INJECTION, SOLUTION INTRAVENOUS CONTINUOUS PRN
Status: DISCONTINUED | OUTPATIENT
Start: 2024-08-21 | End: 2024-08-21 | Stop reason: HOSPADM

## 2024-08-21 RX ORDER — LIDOCAINE HYDROCHLORIDE 20 MG/ML
JELLY TOPICAL PRN
Status: DISCONTINUED | OUTPATIENT
Start: 2024-08-21 | End: 2024-08-21 | Stop reason: ALTCHOICE

## 2024-08-21 RX ORDER — SODIUM CHLORIDE FOR INHALATION 3 %
4 VIAL, NEBULIZER (ML) INHALATION
Status: DISCONTINUED | OUTPATIENT
Start: 2024-08-21 | End: 2024-08-22 | Stop reason: RX

## 2024-08-21 RX ORDER — INSULIN LISPRO 100 [IU]/ML
3 INJECTION, SOLUTION INTRAVENOUS; SUBCUTANEOUS ONCE
Status: DISCONTINUED | OUTPATIENT
Start: 2024-08-21 | End: 2024-08-24 | Stop reason: HOSPADM

## 2024-08-21 RX ORDER — DEXAMETHASONE SODIUM PHOSPHATE 4 MG/ML
INJECTION, SOLUTION INTRA-ARTICULAR; INTRALESIONAL; INTRAMUSCULAR; INTRAVENOUS; SOFT TISSUE PRN
Status: DISCONTINUED | OUTPATIENT
Start: 2024-08-21 | End: 2024-08-21 | Stop reason: SDUPTHER

## 2024-08-21 RX ORDER — PROPOFOL 10 MG/ML
INJECTION, EMULSION INTRAVENOUS CONTINUOUS PRN
Status: DISCONTINUED | OUTPATIENT
Start: 2024-08-21 | End: 2024-08-21 | Stop reason: SDUPTHER

## 2024-08-21 RX ORDER — SODIUM CHLORIDE, SODIUM LACTATE, POTASSIUM CHLORIDE, CALCIUM CHLORIDE 600; 310; 30; 20 MG/100ML; MG/100ML; MG/100ML; MG/100ML
INJECTION, SOLUTION INTRAVENOUS CONTINUOUS
Status: DISCONTINUED | OUTPATIENT
Start: 2024-08-21 | End: 2024-08-21 | Stop reason: HOSPADM

## 2024-08-21 RX ORDER — PHENYLEPHRINE HCL IN 0.9% NACL 1 MG/10 ML
SYRINGE (ML) INTRAVENOUS PRN
Status: DISCONTINUED | OUTPATIENT
Start: 2024-08-21 | End: 2024-08-21 | Stop reason: SDUPTHER

## 2024-08-21 RX ORDER — INSULIN LISPRO 100 [IU]/ML
0-15 INJECTION, SOLUTION INTRAVENOUS; SUBCUTANEOUS ONCE
Status: COMPLETED | OUTPATIENT
Start: 2024-08-21 | End: 2024-08-21

## 2024-08-21 RX ORDER — FAMOTIDINE 10 MG/ML
INJECTION, SOLUTION INTRAVENOUS
Status: DISPENSED
Start: 2024-08-21 | End: 2024-08-22

## 2024-08-21 RX ORDER — SODIUM CHLORIDE 9 MG/ML
INJECTION, SOLUTION INTRAMUSCULAR; INTRAVENOUS; SUBCUTANEOUS
Status: DISPENSED
Start: 2024-08-21 | End: 2024-08-22

## 2024-08-21 RX ADMIN — ALBUTEROL SULFATE 2.5 MG: 2.5 SOLUTION RESPIRATORY (INHALATION) at 22:00

## 2024-08-21 RX ADMIN — THIAMINE HYDROCHLORIDE 200 MG: 100 INJECTION, SOLUTION INTRAMUSCULAR; INTRAVENOUS at 17:04

## 2024-08-21 RX ADMIN — FUROSEMIDE 40 MG: 40 TABLET ORAL at 09:30

## 2024-08-21 RX ADMIN — ALBUTEROL SULFATE 2.5 MG: 2.5 SOLUTION RESPIRATORY (INHALATION) at 06:13

## 2024-08-21 RX ADMIN — Medication 1 CAPSULE: at 09:30

## 2024-08-21 RX ADMIN — INSULIN GLARGINE 7 UNITS: 100 INJECTION, SOLUTION SUBCUTANEOUS at 09:29

## 2024-08-21 RX ADMIN — AMLODIPINE BESYLATE 5 MG: 5 TABLET ORAL at 09:30

## 2024-08-21 RX ADMIN — LIDOCAINE HYDROCHLORIDE 40 MG: 20 INJECTION, SOLUTION EPIDURAL; INFILTRATION; INTRACAUDAL; PERINEURAL at 14:19

## 2024-08-21 RX ADMIN — SODIUM CHLORIDE SOLN NEBU 3% 4 ML: 3 NEBU SOLN at 06:14

## 2024-08-21 RX ADMIN — GUAIFENESIN 600 MG: 600 TABLET, EXTENDED RELEASE ORAL at 09:30

## 2024-08-21 RX ADMIN — SODIUM CHLORIDE SOLN NEBU 3% 4 ML: 3 NEBU SOLN at 22:00

## 2024-08-21 RX ADMIN — Medication 200 MCG: at 14:38

## 2024-08-21 RX ADMIN — SODIUM CHLORIDE, SODIUM LACTATE, POTASSIUM CHLORIDE, AND CALCIUM CHLORIDE: 600; 310; 30; 20 INJECTION, SOLUTION INTRAVENOUS at 14:11

## 2024-08-21 RX ADMIN — GUAIFENESIN 600 MG: 600 TABLET, EXTENDED RELEASE ORAL at 20:35

## 2024-08-21 RX ADMIN — ACETYLCYSTEINE 400 MG: 100 SOLUTION ORAL; RESPIRATORY (INHALATION) at 22:00

## 2024-08-21 RX ADMIN — SODIUM CHLORIDE, PRESERVATIVE FREE 10 ML: 5 INJECTION INTRAVENOUS at 20:35

## 2024-08-21 RX ADMIN — PROPOFOL 25 MCG/KG/MIN: 10 INJECTION, EMULSION INTRAVENOUS at 14:19

## 2024-08-21 RX ADMIN — ALBUTEROL SULFATE 2.5 MG: 2.5 SOLUTION RESPIRATORY (INHALATION) at 08:00

## 2024-08-21 RX ADMIN — FOLIC ACID 1 MG: 1 TABLET ORAL at 09:30

## 2024-08-21 RX ADMIN — SODIUM CHLORIDE, PRESERVATIVE FREE 10 ML: 5 INJECTION INTRAVENOUS at 09:31

## 2024-08-21 RX ADMIN — ACETYLCYSTEINE 400 MG: 100 SOLUTION ORAL; RESPIRATORY (INHALATION) at 08:00

## 2024-08-21 RX ADMIN — DEXAMETHASONE SODIUM PHOSPHATE 8 MG: 4 INJECTION, SOLUTION INTRAMUSCULAR; INTRAVENOUS at 14:38

## 2024-08-21 RX ADMIN — INSULIN LISPRO 3 UNITS: 100 INJECTION, SOLUTION INTRAVENOUS; SUBCUTANEOUS at 14:05

## 2024-08-21 RX ADMIN — BENZOCAINE 1 EACH: 200 SPRAY DENTAL; ORAL; PERIODONTAL at 14:14

## 2024-08-21 RX ADMIN — THIAMINE HYDROCHLORIDE 200 MG: 100 INJECTION, SOLUTION INTRAMUSCULAR; INTRAVENOUS at 09:30

## 2024-08-21 RX ADMIN — ACETYLCYSTEINE 400 MG: 100 SOLUTION ORAL; RESPIRATORY (INHALATION) at 06:14

## 2024-08-21 RX ADMIN — Medication 200 MCG: at 14:32

## 2024-08-21 RX ADMIN — THIAMINE HYDROCHLORIDE 200 MG: 100 INJECTION, SOLUTION INTRAMUSCULAR; INTRAVENOUS at 20:35

## 2024-08-21 RX ADMIN — POLYETHYLENE GLYCOL 3350 34 G: 17 POWDER, FOR SOLUTION ORAL at 20:34

## 2024-08-21 ASSESSMENT — PAIN SCALES - GENERAL
PAINLEVEL_OUTOF10: 0
PAINLEVEL_OUTOF10: 7

## 2024-08-21 ASSESSMENT — ENCOUNTER SYMPTOMS: SHORTNESS OF BREATH: 1

## 2024-08-21 NOTE — PROGRESS NOTES
[facility]  Pulmonary, Critical Care, and Sleep Medicine    Name: Jasmin Pacheco MRN: [id number]   : 1945 Hospital: [facility]   Date: 2024        IMPRESSION:   Acute on chronic hypoxic and hypercapnic respiratory failure requiring intubation due to acute heart failure and likely mild ARDS - s/p extubation - on HFNC & BiPAP. On LTOT - 2L/min.  COVID pneumonia: dx 24.  Severe sepsis due to above, POA, possible bacterial infection superimposed on COVID-pneumonia; resolved  Abnormal CT: Complete atelectasis of the right lung secondary to mucus or debris in trachea & right bronchial tree.  Metabolic encephalopathy - likely toxic/metabolic; resolved  Acute on chronic diastolic heart failure (ECHO 24), grade 2 diastolic dysfunction with an ejection fraction of 55 to 60%, mod pHTN  Contraction alkalosis due to diuresis - resolved  Hypoglycemia - resolved  LEA on CKD 3B  Morbid obesity, Body mass index is 48.92 kg/m².  T2DM   Hx PE - on eliquis outpatient w/ VQ scan \"high probability\" of PE 24     Patient Active Problem List   Diagnosis    Acute respiratory failure (HCC)    Acute on chronic diastolic heart failure (HCC)    Hyperlipidemia    Type 2 diabetes mellitus with obesity (HCC)    Acute cystitis    Shortness of breath    COPD exacerbation (HCC)    Acute on chronic respiratory failure with hypoxia and hypercapnia (HCC)    Acute respiratory failure with hypoxia and hypercarbia (HCC)    Congestive heart failure (HCC)    Pulmonary embolism (HCC)    COVID    ARDS (adult respiratory distress syndrome) (HCC)    Morbid obesity (HCC)    Acute hypercapnic respiratory failure (HCC)      PLAN:   Supplemental oxygen to maintain SpO2 >88%; avoid hyperoxygenation - weaned to 4L/min this Am; continue BIPAP ; 35% FIO2 with sleep.  Rpt CXR with complete [place]-opacification; suspect due to mucous plugging.  Continue Mucinex, Mucomyst, MetaNeb along with 3% nebulized  [Urine:2050]    Intake/Output Summary (Last 24 hours) at 8/21/2024 1010  Last data filed at 8/21/2024 0953  Gross per 24 hour   Intake 240 ml   Output 750 ml   Net -510 ml        Physical Exam:    General: in no apparent distress, alert, and oriented times 3   HEENT: PERRLA, EOMI, fundi benign   Neck: No abnormally enlarged lymph nodes.   Lungs: +Decreased air movement over Right lung fields. CTA on left lung fields. No tachypnea or accessory muscle use   Heart: Regular rate and rhythm or without murmur or extra heart sounds   Abdomen: abdomen is soft without significant tenderness, masses, organomegaly or guarding   Extremity: No LE edema. No cyanosis.    Neuro: alert, grossly nonfocal      DATA:  Labs:  Recent Labs     08/19/24 0331 08/20/24 0142 08/21/24  0346   WBC 13.0 14.3* 15.8*   HGB 8.5* 8.4* 8.8*   HCT 27.5* 26.8* 29.0*    357 383     Recent Labs     08/19/24 0331 08/20/24 0142 08/21/24  0346    138 138   K 4.5 4.5 4.7    102 101   CO2 32 32 31   BUN 27* 25* 18   MG 1.9 1.8 2.0   PHOS 2.5 2.8 2.9     No results for input(s): \"PH\", \"PCO2\", \"PO2\", \"HCO3\", \"FIO2\" in the last 72 hours.                                                  Echo:   08/05/24    ECHO (TTE) COMPLETE (PRN CONTRAST/BUBBLE/STRAIN/3D) 08/06/2024  3:20 PM (Final)    Interpretation Summary    Image quality is technically difficult. Contrast used: Definity. Technically difficult study with poor endocardial visualization, limited Doppler study due to patient's condition and procedure performed with the patient in a sitting position.  Patient ventilated.    Left Ventricle: Normal left ventricular systolic function with a visually estimated EF of 55 - 60%. EF by 2D Simpsons Biplane is 58%. Left ventricle size is normal. Unable to assess wall thickness. No obvious regional wall motion abnormalities. Grade II diastolic dysfunction with increased LAP.    Right Ventricle: Not well visualized. Right ventricle is dilated.

## 2024-08-21 NOTE — CARE COORDINATION
CM will need Home Health orders when possible.                 Dede West, RN  Case Management [phone]

## 2024-08-21 NOTE — PERIOP NOTE
TRANSFER - IN REPORT:    Verbal report received from Sonia RN on Jasmin Pacheco  being received from [facility] for ordered procedure      Report consisted of patient's Situation, Background, Assessment and   Recommendations(SBAR).     Information from the following report(s) Nurse Handoff Report was reviewed with the receiving nurse.    Opportunity for questions and clarification was provided.      Assessment completed upon patient's arrival to unit and care assumed.

## 2024-08-21 NOTE — CARE COORDINATION
08/21/24 1152   IMM Letter   IMM Letter given to Patient/Family/Significant other/Guardian/POA/by: Dede West   IMM Letter date given: 08/21/24   IMM Letter time given: 9126

## 2024-08-21 NOTE — ANESTHESIA POSTPROCEDURE EVALUATION
Department of Anesthesiology  Postprocedure Note    Patient: Jasmin Pacheco  MRN: [id number]  YOB: 1945  Date of evaluation: 8/21/2024    Procedure Summary       Date: 08/21/24 Room / Location: [facility] ENDO 01 / [facility] ENDOSCOPY    Anesthesia Start: 1410 Anesthesia Stop: 1456    Procedure: BRONCHOSCOPY; NO C-ARM (Chest) Diagnosis:       Abnormal chest x-ray      Atelectasis      (Abnormal chest x-ray [R93.89])      (Atelectasis [J98.11])    Surgeons: Haris Smart DO Responsible Provider: Erlin Lowe MD    Anesthesia Type: MAC ASA Status: 3            Anesthesia Type: MAC    Trever Phase I: Trever Score: 9    Trever Phase II:      Anesthesia Post Evaluation    Patient location during evaluation: PACU  Patient participation: complete - patient participated  Level of consciousness: awake  Airway patency: patent  Nausea & Vomiting: no nausea  Cardiovascular status: hemodynamically stable  Respiratory status: acceptable and nasal cannula  Hydration status: euvolemic  Pain management: adequate    No notable events documented.

## 2024-08-21 NOTE — CARE COORDINATION
Clinicals uploaded to [facility].             Dede West RN  Case Management [phone]

## 2024-08-21 NOTE — CARE COORDINATION
CM spoke with patient, patient refusing SNF.   Patient said she wants to go home with home health.   CM let patient know that [facility] accepted patient.   Patient is agreeable to [facility].   Patient confirmed that she is on Home Oxygen 1 liter during the day, and 2 liters at night.   Patient confirmed that she has Concentrator and Portable Oxygen in the home, and is serviced by [employer].   CM let patient know that previous CM has ordered Hospital Bed and Wheelchair, and Marcella Lift to be delivered to patient's home.   Patient said she will need [facility] at discharge, and home address on facesheet is confirmed as correct for discharge.               Dede West RN  Case Management [phone]

## 2024-08-21 NOTE — PROGRESS NOTES
Speech Pathology:     Pt currently NPO for bronchoscopy. Will follow up at a later date/time or as medical condition permits.     Thank you for this referral.    Vangie Dyer M.S., CCC-SLP/L  Speech-Language Pathologist

## 2024-08-21 NOTE — PROGRESS NOTES
Attempted to see patient for PT treatment however she is getting ready to leave the floor for a procedure and asks to defer for today.  Will continue to follow.  Payal Grigsby, PT

## 2024-08-21 NOTE — PLAN OF CARE
Problem: Safety - Adult  Goal: Free from fall injury  8/21/2024 0057 by Nuris Diaz RN  Outcome: Progressing  Flowsheets (Taken 8/20/2024 1515 by Julissa Brown RN)  Free From Fall Injury: Instruct family/caregiver on patient safety     Problem: Chronic Conditions and Co-morbidities  Goal: Patient's chronic conditions and co-morbidity symptoms are monitored and maintained or improved  8/21/2024 0057 by Nuris Diaz RN  Outcome: Progressing  Flowsheets (Taken 8/20/2024 1512 by Julissa Brown, RN)  Care Plan - Patient's Chronic Conditions and Co-Morbidity Symptoms are Monitored and Maintained or Improved:   Monitor and assess patient's chronic conditions and comorbid symptoms for stability, deterioration, or improvement   Collaborate with multidisciplinary team to address chronic and comorbid conditions and prevent exacerbation or deterioration   Update acute care plan with appropriate goals if chronic or comorbid symptoms are exacerbated and prevent overall improvement and discharge     Problem: Discharge Planning  Goal: Discharge to home or other facility with appropriate resources  8/21/2024 0057 by Nuris Diaz RN  Outcome: Progressing  Flowsheets (Taken 8/20/2024 1512 by Julissa Brown, RN)  Discharge to home or other facility with appropriate resources:   Identify barriers to discharge with patient and caregiver   Arrange for needed discharge resources and transportation as appropriate   Identify discharge learning needs (meds, wound care, etc)   Refer to discharge planning if patient needs post-hospital services based on physician order or complex needs related to functional status, cognitive ability or social support system     Problem: Pain  Goal: Verbalizes/displays adequate comfort level or baseline comfort level  8/21/2024 0057 by Nuris Diaz RN  Outcome: Progressing     Problem: ABCDS Injury Assessment  Goal: Absence of physical injury  8/21/2024 0057 by Nuris Diaz RN  Outcome:  Progressing     Problem: Nutrition Deficit:  Goal: Optimize nutritional status  8/21/2024 0057 by Nuris Diaz, RN  Outcome: Progressing     Problem: Skin/Tissue Integrity  Goal: Absence of new skin breakdown  Description: 1.  Monitor for areas of redness and/or skin breakdown  2.  Assess vascular access sites hourly  3.  Every 4-6 hours minimum:  Change oxygen saturation probe site  4.  Every 4-6 hours:  If on nasal continuous positive airway pressure, respiratory therapy assess nares and determine need for appliance change or resting period.  8/21/2024 0057 by Nuris Diaz, RN  Outcome: Progressing

## 2024-08-21 NOTE — ANESTHESIA PRE PROCEDURE
Department of Anesthesiology  Preprocedure Note       Name:  Jasmin Pacheco   Age:  78 y.o.  :  1945                                          MRN:  [id number]         Date:  2024      Surgeon: Surgeon(s):  Haris Smart DO    Procedure: Procedure(s):  BRONCHOSCOPY; NO C-ARM    Medications prior to admission:   Prior to Admission medications    Medication Sig Start Date End Date Taking? Authorizing Provider   apixaban (ELIQUIS) 5 MG TABS tablet Take 1 tablet by mouth 2 times daily 8/10/23  Yes Chacho Hawley MD   albuterol sulfate HFA (PROVENTIL;VENTOLIN;PROAIR) 108 (90 Base) MCG/ACT inhaler Inhale 2 puffs into the lungs every 6 hours as needed for Wheezing   Yes ProviderCali MD   triamcinolone (KENALOG) 0.1 % cream Apply topically 2 times daily Apply topically 2 times daily.   Yes Cali Ortiz MD   furosemide (LASIX) 40 MG tablet Take 1 tablet by mouth 2 times daily 23  Yes Tyler Winston MD   atorvastatin (LIPITOR) 20 MG tablet Take 1 tablet by mouth nightly 22  Yes Cali Ortiz MD   glipiZIDE (GLUCOTROL XL) 5 MG extended release tablet Take 1 tablet by mouth daily 3/24/23  Yes ProviderCali MD       Current medications:    Current Facility-Administered Medications   Medication Dose Route Frequency Provider Last Rate Last Admin    lactated ringers IV soln infusion   IntraVENous Continuous Erlin Lowe MD        famotidine (PEPCID) 20 mg in sodium chloride (PF) 0.9 % 10 mL injection  20 mg IntraVENous Once Erlin Lowe MD        insulin lispro (HUMALOG,ADMELOG) injection vial 3 Units  3 Units SubCUTAneous Once Erlin Lowe MD        insulin lispro (HUMALOG,ADMELOG) injection vial 0-15 Units  0-15 Units SubCUTAneous Once Erlin Lowe MD        glucose chewable tablet 16 g  4 tablet Oral PRN Erlin Lowe MD        dextrose bolus 10% 125 mL  125 mL IntraVENous PRN Erlin Lowe MD        Or    dextrose bolus 10% 250 mL  250 mL

## 2024-08-21 NOTE — PROGRESS NOTES
Occupational Therapy      Attempted OT tx x1, however pt states transport is on it's way to take her for medical procedure. Will continue to follow this pt. Thank you for this referral.    ROSE Rodriguez/ EVELIA

## 2024-08-21 NOTE — PERIOP NOTE
TRANSFER - IN REPORT:    Verbal report received from VINAYAK Fowler  on Jasmin Pacheco  being received from Room 464 for ordered procedure      Report consisted of patient's Situation, Background, Assessment and   Recommendations(SBAR).     Information from the following report(s) Nurse Handoff Report was reviewed with the receiving nurse.    Opportunity for questions and clarification was provided.      Assessment completed upon patient's arrival to unit and care assumed.

## 2024-08-22 ENCOUNTER — APPOINTMENT (OUTPATIENT)
Facility: HOSPITAL | Age: 79
DRG: 870 | End: 2024-08-22
Attending: INTERNAL MEDICINE
Payer: MEDICARE

## 2024-08-22 LAB
ANION GAP SERPL CALC-SCNC: 4 MMOL/L (ref 3–18)
BASOPHILS # BLD: 0 K/UL (ref 0–0.1)
BASOPHILS NFR BLD: 0 % (ref 0–2)
BUN SERPL-MCNC: 21 MG/DL (ref 7–18)
BUN/CREAT SERPL: 17 (ref 12–20)
CALCIUM SERPL-MCNC: 8.4 MG/DL (ref 8.5–10.1)
CHLORIDE SERPL-SCNC: 98 MMOL/L (ref 100–111)
CO2 SERPL-SCNC: 33 MMOL/L (ref 21–32)
CREAT SERPL-MCNC: 1.27 MG/DL (ref 0.6–1.3)
DIFFERENTIAL METHOD BLD: ABNORMAL
EOSINOPHIL # BLD: 0 K/UL (ref 0–0.4)
EOSINOPHIL NFR BLD: 0 % (ref 0–5)
ERYTHROCYTE [DISTWIDTH] IN BLOOD BY AUTOMATED COUNT: 16.5 % (ref 11.6–14.5)
GLUCOSE BLD STRIP.AUTO-MCNC: 181 MG/DL (ref 70–110)
GLUCOSE BLD STRIP.AUTO-MCNC: 198 MG/DL (ref 70–110)
GLUCOSE BLD STRIP.AUTO-MCNC: 225 MG/DL (ref 70–110)
GLUCOSE BLD STRIP.AUTO-MCNC: 280 MG/DL (ref 70–110)
GLUCOSE SERPL-MCNC: 210 MG/DL (ref 74–99)
HCT VFR BLD AUTO: 28.1 % (ref 35–45)
HGB BLD-MCNC: 8.5 G/DL (ref 12–16)
IMM GRANULOCYTES # BLD AUTO: 0.1 K/UL (ref 0–0.04)
IMM GRANULOCYTES NFR BLD AUTO: 1 % (ref 0–0.5)
LYMPHOCYTES # BLD: 0.9 K/UL (ref 0.9–3.6)
LYMPHOCYTES NFR BLD: 7 % (ref 21–52)
MAGNESIUM SERPL-MCNC: 2 MG/DL (ref 1.6–2.6)
MCH RBC QN AUTO: 28.6 PG (ref 24–34)
MCHC RBC AUTO-ENTMCNC: 30.2 G/DL (ref 31–37)
MCV RBC AUTO: 94.6 FL (ref 78–100)
MONOCYTES # BLD: 0.3 K/UL (ref 0.05–1.2)
MONOCYTES NFR BLD: 2 % (ref 3–10)
NEUTS SEG # BLD: 11.8 K/UL (ref 1.8–8)
NEUTS SEG NFR BLD: 91 % (ref 40–73)
NRBC # BLD: 0 K/UL (ref 0–0.01)
NRBC BLD-RTO: 0 PER 100 WBC
PHOSPHATE SERPL-MCNC: 3.8 MG/DL (ref 2.5–4.9)
PLATELET # BLD AUTO: 356 K/UL (ref 135–420)
PMV BLD AUTO: 10.6 FL (ref 9.2–11.8)
POTASSIUM SERPL-SCNC: 5.5 MMOL/L (ref 3.5–5.5)
RBC # BLD AUTO: 2.97 M/UL (ref 4.2–5.3)
SODIUM SERPL-SCNC: 135 MMOL/L (ref 136–145)
WBC # BLD AUTO: 13 K/UL (ref 4.6–13.2)

## 2024-08-22 PROCEDURE — 6360000002 HC RX W HCPCS: Performed by: INTERNAL MEDICINE

## 2024-08-22 PROCEDURE — 2700000000 HC OXYGEN THERAPY PER DAY

## 2024-08-22 PROCEDURE — 99232 SBSQ HOSP IP/OBS MODERATE 35: CPT | Performed by: INTERNAL MEDICINE

## 2024-08-22 PROCEDURE — 80048 BASIC METABOLIC PNL TOTAL CA: CPT

## 2024-08-22 PROCEDURE — 97535 SELF CARE MNGMENT TRAINING: CPT

## 2024-08-22 PROCEDURE — 82962 GLUCOSE BLOOD TEST: CPT

## 2024-08-22 PROCEDURE — 94761 N-INVAS EAR/PLS OXIMETRY MLT: CPT

## 2024-08-22 PROCEDURE — 1100000000 HC RM PRIVATE

## 2024-08-22 PROCEDURE — 97112 NEUROMUSCULAR REEDUCATION: CPT

## 2024-08-22 PROCEDURE — 6370000000 HC RX 637 (ALT 250 FOR IP): Performed by: STUDENT IN AN ORGANIZED HEALTH CARE EDUCATION/TRAINING PROGRAM

## 2024-08-22 PROCEDURE — 94660 CPAP INITIATION&MGMT: CPT

## 2024-08-22 PROCEDURE — 84100 ASSAY OF PHOSPHORUS: CPT

## 2024-08-22 PROCEDURE — 2580000003 HC RX 258: Performed by: INTERNAL MEDICINE

## 2024-08-22 PROCEDURE — 71045 X-RAY EXAM CHEST 1 VIEW: CPT

## 2024-08-22 PROCEDURE — 92526 ORAL FUNCTION THERAPY: CPT

## 2024-08-22 PROCEDURE — 99231 SBSQ HOSP IP/OBS SF/LOW 25: CPT | Performed by: STUDENT IN AN ORGANIZED HEALTH CARE EDUCATION/TRAINING PROGRAM

## 2024-08-22 PROCEDURE — 6370000000 HC RX 637 (ALT 250 FOR IP): Performed by: NURSE PRACTITIONER

## 2024-08-22 PROCEDURE — 94640 AIRWAY INHALATION TREATMENT: CPT

## 2024-08-22 PROCEDURE — 6360000002 HC RX W HCPCS: Performed by: NURSE PRACTITIONER

## 2024-08-22 PROCEDURE — 94668 MNPJ CHEST WALL SBSQ: CPT

## 2024-08-22 PROCEDURE — 85025 COMPLETE CBC W/AUTO DIFF WBC: CPT

## 2024-08-22 PROCEDURE — 83735 ASSAY OF MAGNESIUM: CPT

## 2024-08-22 PROCEDURE — 97530 THERAPEUTIC ACTIVITIES: CPT

## 2024-08-22 PROCEDURE — 6370000000 HC RX 637 (ALT 250 FOR IP): Performed by: INTERNAL MEDICINE

## 2024-08-22 PROCEDURE — 36415 COLL VENOUS BLD VENIPUNCTURE: CPT

## 2024-08-22 PROCEDURE — 94669 MECHANICAL CHEST WALL OSCILL: CPT

## 2024-08-22 RX ORDER — GAUZE BANDAGE 2" X 2"
100 BANDAGE TOPICAL DAILY
Status: DISCONTINUED | OUTPATIENT
Start: 2024-08-23 | End: 2024-08-24 | Stop reason: HOSPADM

## 2024-08-22 RX ADMIN — FUROSEMIDE 40 MG: 40 TABLET ORAL at 07:57

## 2024-08-22 RX ADMIN — ACETYLCYSTEINE 400 MG: 100 SOLUTION ORAL; RESPIRATORY (INHALATION) at 23:25

## 2024-08-22 RX ADMIN — THIAMINE HYDROCHLORIDE 200 MG: 100 INJECTION, SOLUTION INTRAMUSCULAR; INTRAVENOUS at 07:58

## 2024-08-22 RX ADMIN — ACETYLCYSTEINE 400 MG: 100 SOLUTION ORAL; RESPIRATORY (INHALATION) at 20:03

## 2024-08-22 RX ADMIN — ACETYLCYSTEINE 400 MG: 100 SOLUTION ORAL; RESPIRATORY (INHALATION) at 15:30

## 2024-08-22 RX ADMIN — GUAIFENESIN 600 MG: 600 TABLET, EXTENDED RELEASE ORAL at 07:57

## 2024-08-22 RX ADMIN — ALBUTEROL SULFATE 2.5 MG: 2.5 SOLUTION RESPIRATORY (INHALATION) at 20:03

## 2024-08-22 RX ADMIN — ACETYLCYSTEINE 400 MG: 100 SOLUTION ORAL; RESPIRATORY (INHALATION) at 08:17

## 2024-08-22 RX ADMIN — INSULIN GLARGINE 7 UNITS: 100 INJECTION, SOLUTION SUBCUTANEOUS at 07:59

## 2024-08-22 RX ADMIN — ACETYLCYSTEINE 400 MG: 100 SOLUTION ORAL; RESPIRATORY (INHALATION) at 04:00

## 2024-08-22 RX ADMIN — ALBUTEROL SULFATE 2.5 MG: 2.5 SOLUTION RESPIRATORY (INHALATION) at 04:00

## 2024-08-22 RX ADMIN — ALBUTEROL SULFATE 2.5 MG: 2.5 SOLUTION RESPIRATORY (INHALATION) at 23:25

## 2024-08-22 RX ADMIN — ALBUTEROL SULFATE 2.5 MG: 2.5 SOLUTION RESPIRATORY (INHALATION) at 08:18

## 2024-08-22 RX ADMIN — ALBUTEROL SULFATE 2.5 MG: 2.5 SOLUTION RESPIRATORY (INHALATION) at 12:05

## 2024-08-22 RX ADMIN — INSULIN LISPRO 4 UNITS: 100 INJECTION, SOLUTION INTRAVENOUS; SUBCUTANEOUS at 21:15

## 2024-08-22 RX ADMIN — GUAIFENESIN 600 MG: 600 TABLET, EXTENDED RELEASE ORAL at 20:56

## 2024-08-22 RX ADMIN — Medication 1 CAPSULE: at 07:56

## 2024-08-22 RX ADMIN — INSULIN LISPRO 8 UNITS: 100 INJECTION, SOLUTION INTRAVENOUS; SUBCUTANEOUS at 16:22

## 2024-08-22 RX ADMIN — SODIUM CHLORIDE, PRESERVATIVE FREE 5 ML: 5 INJECTION INTRAVENOUS at 20:58

## 2024-08-22 RX ADMIN — SODIUM CHLORIDE, PRESERVATIVE FREE 10 ML: 5 INJECTION INTRAVENOUS at 08:00

## 2024-08-22 RX ADMIN — FOLIC ACID 1 MG: 1 TABLET ORAL at 07:57

## 2024-08-22 RX ADMIN — ALBUTEROL SULFATE 2.5 MG: 2.5 SOLUTION RESPIRATORY (INHALATION) at 15:29

## 2024-08-22 RX ADMIN — ACETYLCYSTEINE 400 MG: 100 SOLUTION ORAL; RESPIRATORY (INHALATION) at 12:05

## 2024-08-22 ASSESSMENT — PAIN SCALES - GENERAL
PAINLEVEL_OUTOF10: 0

## 2024-08-22 NOTE — PROGRESS NOTES
[facility]  Progress Note    Patient: Jasmin Pacheco Age: 78 y.o. : 1945 MR#: [id number] SSN: [id number]      Subjective/24-hour events:     No acute events overnight, no new concerns or complaints, vitals stable.    Assessment:   Acute hypoxic and hypercapnic respiratory failure  Acute on chronic diastolic CHF  COVID-19 pneumonia  Contraction alkalosis secondary to diuresis  DM2  Morbid obesity  Acute metabolic encephalopathy, resolved  History of PE, on anticoagulation with Eliquis    Plan:   Continue to monitor off of antibiotics.  Status post bronchoscopy which did not show any obvious mucous plugs or aspirate.  Continue diuresis, monitor electrolytes and renal indices.  Therapy as tolerated.  Patient still wanting to go home at discharge.  Not amenable to SNF placement.    Anticipated discharge:  - [facility] v SNF    Case discussed with:  [x]Patient  []Family  [x] Nursing  [x]Case Management  DVT Prophylaxis:  []Lovenox  []Hep SQ  []SCDs  []Coumadin   []On Heparin gtt [x]PO anticoagulant    Objective:   VS: /72   Pulse 86   Temp 98.8 °F (37.1 °C) (Axillary)   Resp 20   Ht 1.702 m (5' 7\")   Wt 136 kg (299 lb 13.2 oz)   SpO2 99%   BMI 46.96 kg/m²      Tmax/24hrs: Temp (24hrs), Av.4 °F (36.9 °C), Min:97 °F (36.1 °C), Max:99 °F (37.2 °C)    Intake/Output Summary (Last 24 hours) at 2024 2111  Last data filed at 2024 1756  Gross per 24 hour   Intake 400 ml   Output 1300 ml   Net -900 ml       Gen:  In NAD.  Nontoxic-appearing.  Lungs: Clear, no wheezes  Effort nonlabored.  CV: RRR.  Abdomen: Soft, NTTP.  Extremities: Warm, no pitting edema or ischemia.  Neuro:  Awake and alert, moves extremities spontaneously.    Current Facility-Administered Medications   Medication Dose Route Frequency    insulin lispro (HUMALOG,ADMELOG) injection vial 3 Units  3 Units SubCUTAneous Once    famotidine (PEPCID) 20 MG/2ML injection        sodium chloride (PF)  0.9 % injection        sodium chloride (Inhalant) 3 % nebulizer solution 4 mL  4 mL Nebulization BID RT    And    albuterol (PROVENTIL) (2.5 MG/3ML) 0.083% nebulizer solution 2.5 mg  2.5 mg Nebulization Q4H RT    acetylcysteine (MUCOMYST) 10 % solution 400 mg  4 mL Inhalation Q4H RT    guaiFENesin (MUCINEX) extended release tablet 600 mg  600 mg Oral BID    amLODIPine (NORVASC) tablet 5 mg  5 mg Oral Daily    furosemide (LASIX) tablet 40 mg  40 mg Oral Daily    insulin glargine (LANTUS) injection vial 7 Units  7 Units SubCUTAneous Daily    [Held by provider] apixaban (ELIQUIS) tablet 5 mg  5 mg Oral BID    insulin lispro (HUMALOG,ADMELOG) injection vial 0-16 Units  0-16 Units SubCUTAneous 4x Daily AC & HS    lactobacillus (CULTURELLE) capsule 1 capsule  1 capsule Oral Daily with breakfast    LORazepam (ATIVAN) injection 2 mg  2 mg IntraVENous Q8H PRN    ipratropium 0.5 mg-albuterol 2.5 mg (DUONEB) nebulizer solution 1 Dose  1 Dose Inhalation Q4H PRN    phenol 1.4 % mouth spray 1 spray  1 spray Mouth/Throat Q2H PRN    polyethylene glycol (GLYCOLAX) packet 34 g  34 g Oral BID    glucose chewable tablet 16 g  4 tablet Oral PRN    dextrose bolus 10% 125 mL  125 mL IntraVENous PRN    Or    dextrose bolus 10% 250 mL  250 mL IntraVENous PRN    Glucagon Emergency SOLR 1 mg  1 mg SubCUTAneous PRN    dextrose 10 % infusion   IntraVENous Continuous PRN    thiamine (B-1) injection 200 mg  200 mg IntraVENous TID    folic acid (FOLVITE) tablet 1 mg  1 mg Oral Daily    sodium chloride flush 0.9 % injection 5-40 mL  5-40 mL IntraVENous 2 times per day    sodium chloride flush 0.9 % injection 5-40 mL  5-40 mL IntraVENous PRN    0.9 % sodium chloride infusion   IntraVENous PRN    potassium chloride 20 mEq/50 mL IVPB (Central Line)  20 mEq IntraVENous PRN    Or    potassium chloride 10 mEq/100 mL IVPB (Peripheral Line)  10 mEq IntraVENous PRN    magnesium sulfate 2000 mg in 50 mL IVPB premix  2,000 mg IntraVENous PRN    polyethylene

## 2024-08-22 NOTE — PLAN OF CARE
Problem: Physical Therapy - Adult  Goal: By Discharge: Performs mobility at highest level of function for planned discharge setting.  See evaluation for individualized goals.  Description: Physical Therapy Goals  Re-evaluated 8/19/2024 and updated below.  Initiated 8/12/2024 and to be accomplished within 7 day(s)  1.  Patient will move from supine to sit and sit to supine in bed with moderate assistance.    2.  Patient will transfer from bed to chair and chair to bed with moderate assistance using the least restrictive device.  3.  Patient will perform sit to stand with moderate assistance.  4.  Patient will ambulate with moderate assistance for 5 feet with the least restrictive device.   5.  Patient will maintain seated EOB 8 minutes with supervision.    PLOF: Lives with sons. One story home with 2 steps and ramp entry. Amb with rollator. Sleeps in recliner.   Outcome: Progressing     PHYSICAL THERAPY TREATMENT    Patient: Jasmin Pacheco (78 y.o. female)  Date: 8/22/2024  Diagnosis: Acute respiratory failure with hypoxia and hypercarbia (HCC) [J96.01, J96.02]  Acute hypercapnic respiratory failure (HCC) [J96.02] Acute respiratory failure with hypoxia and hypercarbia (HCC)  Procedure(s) (LRB):  BRONCHOSCOPY; NO C-ARM (N/A) 1 Day Post-Op  Precautions: Fall Risk, General Precautions, Aspiration Risk,  ,  ,  ,  ,  ,  ,      ASSESSMENT:  Patient seen for PT follow up treatment with focus on progressing OOB mobility. Received supine; agreeable. Co-treatment with OT services completed to provide multiple points of facilitation in setting of medical complexity. MaxA x2 required for bed mobility and sit to stand transfers. Cues provided as listed below with static balance training completed at EOB. Improved performance with repeat trials/prolonged upright; however, highly impacted by fatigue. Continue PT POC.    Progression toward goals:   []      Improving appropriately and progressing toward goals  [x]      Improving

## 2024-08-22 NOTE — PROGRESS NOTES
[facility]  Pulmonary, Critical Care, and Sleep Medicine    Name: Jasmin Pacheco MRN: [id number]   : 1945 Hospital: [facility]   Date: 2024        IMPRESSION:   Acute on chronic hypoxic and hypercapnic respiratory failure requiring intubation due to acute heart failure and likely mild ARDS - s/p extubation - on HFNC & BiPAP. On LTOT - 2L/min.  COVID pneumonia: dx 24.  Severe sepsis due to above, POA, possible bacterial infection superimposed on COVID-pneumonia; resolved  Abnormal CT: Complete atelectasis of the right lung secondary to mucus or debris in trachea & right bronchial tree. S/p Bronch () with clearance of tracheobronchial secretions; endobronchial mucosa hyperemic & edematous.  Metabolic encephalopathy - likely toxic/metabolic; resolved  Acute on chronic diastolic heart failure (ECHO 24), grade 2 diastolic dysfunction with an ejection fraction of 55 to 60%, mod pHTN  Contraction alkalosis due to diuresis - resolved  Hypoglycemia - resolved  LEA on CKD 3B  Morbid obesity, Body mass index is 48.92 kg/m².  T2DM   Hx PE - on Atbrox outpatient w/ VQ scan \"high probability\" of PE 24     Patient Active Problem List   Diagnosis    Acute respiratory failure (HCC)    Acute on chronic diastolic heart failure (HCC)    Hyperlipidemia    Type 2 diabetes mellitus with obesity (HCC)    Acute cystitis    Shortness of breath    COPD exacerbation (HCC)    Acute on chronic respiratory failure with hypoxia and hypercapnia (HCC)    Acute respiratory failure with hypoxia and hypercarbia (HCC)    Congestive heart failure (HCC)    Pulmonary embolism (HCC)    COVID    ARDS (adult respiratory distress syndrome) (HCC)    Morbid obesity (HCC)    Acute hypercapnic respiratory failure (HCC)    Abnormal CT of the chest      PLAN:   Supplemental oxygen to maintain SpO2 >88%; avoid hyperoxygenation - weaned to 3L/min; continue BIPAP 16/8; 35% FIO2 with sleep.  Rpt CXR  yesterday evening with interval improvement of right lung aeration  Continue Mucomyst q4hrs; 3% saline Q12hrs and nebs. Ct Mucinex.  Recc keep off Right side  Follow-up bronch cultures  Continue diuretic regimen- on Lasix 40 mg daily - suspect hypervolemia likely etiology of persistent hypoxia / need for supplemental O2; monitor   S/p Prednisone x5 day course  Abx - defer to primary service  Bronchodilators - prn  Please assess for home oxygen need prior to discharge  Aggressive pulmonary toileting/bronchial hygiene  Frequent incentive spirometry and flutter valve - encourage use  Aspiration precautions including elevating HOB >30deg  PT/OT, OOB, ambulate with assistance as tolerated  DVT ppx per primary service     Pulmonary will continue to follow     Subjective/Interval History:   2024:  Patient seen and examined at bedside this morning. No acute events overnight. States her breathing is at baseline. Notes slight cough with phlegm production once last night. No fever, chills, chest pain or abdominal distress.    ROS:Pertinent items are noted in HPI.    Objective:   Vital Signs:    /62   Pulse 67   Temp 98 °F (36.7 °C) (Axillary)   Resp 20   Ht 1.702 m (5' 7\")   Wt 136 kg (299 lb 13.2 oz)   SpO2 98%   BMI 46.96 kg/m²             Temp (24hrs), Av.2 °F (36.8 °C), Min:97 °F (36.1 °C), Max:98.8 °F (37.1 °C)       Intake/Output:   Last shift:      No intake/output data recorded.  Last 3 shifts:  1901 -  0700  In: 400 [I.V.:400]  Out: 1300 [Urine:1300]    Intake/Output Summary (Last 24 hours) at 2024 1025  Last data filed at 2024 1756  Gross per 24 hour   Intake 400 ml   Output 800 ml   Net -400 ml        Physical Exam:    General: in no apparent distress, alert, and oriented times 3   HEENT: PERRLA, EOMI, fundi benign   Neck: no JVD   Lungs: +Mildly reduced air movement over Right lung fields, otherwise CTA. No tachypnea or accessory muscle use   Heart: Regular rate and rhythm  reviewed with radiologist   []No change from prior, tubes and lines in adequate position  []Improved   []Worsening    High complexity decision making was performed during the evaluation of this patient at high risk for decompensation with multiple organ involvement     Above mentioned total time spent on reviewing the case/medical record/data/notes/EMR/patient examination/documentation/coordinating care with nurse/consultants, exclusive of procedures with complex decision making performed and > 50% time spent in face to face evaluation.    Haris Smart DO   8/22/2024  Pulmonary, Critical Care Medicine  [facility]

## 2024-08-22 NOTE — PROGRESS NOTES
[facility]  Progress Note    Patient: Jasmin Pacheco Age: 78 y.o. : 1945 MR#: [id number] SSN: [id number]      Subjective/24-hour events:     No acute events overnight, no new concerns or complaints, vitals stable.    Assessment:   Acute hypoxic and hypercapnic respiratory failure  Acute on chronic diastolic CHF  COVID-19 pneumonia  Contraction alkalosis secondary to diuresis  DM2  Morbid obesity  Acute metabolic encephalopathy, resolved  History of PE, on anticoagulation with Eliquis    Plan:   Overall significantly improved, will likely be able to discharge tomorrow    Anticipated discharge:  [facility]     Case discussed with:  [x]Patient  []Family  [x] Nursing  [x]Case Management  DVT Prophylaxis:  []Lovenox  []Hep SQ  []SCDs  []Coumadin   []On Heparin gtt [x]PO anticoagulant    Objective:   VS: /60   Pulse 83   Temp 97.8 °F (36.6 °C) (Oral)   Resp 20   Ht 1.702 m (5' 7\")   Wt 136 kg (299 lb 13.2 oz)   SpO2 97%   BMI 46.96 kg/m²      Tmax/24hrs: Temp (24hrs), Av.2 °F (36.8 °C), Min:97.8 °F (36.6 °C), Max:98.8 °F (37.1 °C)    Intake/Output Summary (Last 24 hours) at 2024 1754  Last data filed at 2024 1526  Gross per 24 hour   Intake 600 ml   Output 1500 ml   Net -900 ml       Gen:  In NAD.  Nontoxic-appearing.  Lungs: Clear, no wheezes  Effort nonlabored.  CV: RRR.  Abdomen: Soft, NTTP.  Extremities: Warm, no pitting edema or ischemia.  Neuro:  Awake and alert, moves extremities spontaneously.    Current Facility-Administered Medications   Medication Dose Route Frequency    [START ON 2024] thiamine mononitrate tablet 100 mg  100 mg Oral Daily    insulin lispro (HUMALOG,ADMELOG) injection vial 3 Units  3 Units SubCUTAneous Once    sodium chloride (Inhalant) 3 % nebulizer solution 4 mL  4 mL Nebulization BID RT    And    albuterol (PROVENTIL) (2.5 MG/3ML) 0.083% nebulizer solution 2.5 mg  2.5 mg Nebulization Q4H RT    acetylcysteine  Glucose 198 (H) 70 - 110 mg/dL   POCT Glucose    Collection Time: 08/22/24  3:26 PM   Result Value Ref Range    POC Glucose 280 (H) 70 - 110 mg/dL         Signed By: Keith Rossi MD

## 2024-08-22 NOTE — PLAN OF CARE
Problem: Safety - Adult  Goal: Free from fall injury  Outcome: Progressing     Problem: Chronic Conditions and Co-morbidities  Goal: Patient's chronic conditions and co-morbidity symptoms are monitored and maintained or improved  Recent Flowsheet Documentation  Taken 8/22/2024 [id number] by Fabi Ayala, RN  Care Plan - Patient's Chronic Conditions and Co-Morbidity Symptoms are Monitored and Maintained or Improved: Monitor and assess patient's chronic conditions and comorbid symptoms for stability, deterioration, or improvement

## 2024-08-22 NOTE — PLAN OF CARE
Problem: Occupational Therapy - Adult  Goal: By Discharge: Performs self-care activities at highest level of function for planned discharge setting.  See evaluation for individualized goals.  Description: Occupational Therapy Goals:  Initiated 8/12/2024 to be met within 7-10 days.    1.  Patient will perform bed mobility in preparation for ADLs with moderate assistance.   2.  Patient will perform grooming with supervision/set-up.  3.  Patient will perform self-feeding with modified independence following set-up.  4.  Patient will perform upper body dressing with minimal assistance/contact guard assist.  5.  Patient will perform functional activity sitting EOB for approx. 5 minutes with supervision/set-up, F+ balance in preparation for progression to standing goals.  6.  Patient will participate in upper extremity therapeutic exercise/activities with supervision/set-up for 8-10 minutes to increase strength/endurance for ADLs.    7.  Patient will utilize energy conservation techniques during functional activities with verbal cues.        PLOF: Pt reports she needed assist with self-care and used a rollator for functional mobility.  Outcome: Progressing   OCCUPATIONAL THERAPY TREATMENT    Patient: Jasmin Pacheco (78 y.o. female)  Date: 8/22/2024  Diagnosis: Acute respiratory failure with hypoxia and hypercarbia (HCC) [J96.01, J96.02]  Acute hypercapnic respiratory failure (HCC) [J96.02] Acute respiratory failure with hypoxia and hypercarbia (HCC)  Procedure(s) (LRB):  BRONCHOSCOPY; NO C-ARM (N/A) 1 Day Post-Op  Precautions: Fall Risk, General Precautions, Aspiration Risk,  ,  ,  ,  ,  ,  ,      Chart, occupational therapy assessment, plan of care, and goals were reviewed.  ASSESSMENT:  Pt presents in bed agreeable to PT/OT co-treat for increased safety. See functional levels for bed mobility and UB / LB dressing below. Pt is able to tolerate a total of~ 9 minutes seated on EOB with constant support. Pt demo sit to  Training: Yes  Interventions: Verbal cues;Tactile cues (cues for forward weight shift, hand placement, LE positioning)  Sit to Stand: Maximum assistance;Assist X2  Stand to Sit: Maximum assistance    Balance:  Balance  Sitting: Impaired  Sitting - Static: Poor (constant support);Fair (occasional)  Standing: Impaired  Standing - Static: Poor    ADL Intervention:    UE Dressing: Moderate assistance (Seated on EOB to [facility] gown)  LE Dressing: Dependent/Total (To don socks)    Pain:  Intensity Pre-treatment: 0/10   Intensity Post-treatment: 0/10  Scale: Numeric Rating Scale    Activity Tolerance:    Activity Tolerance: Patient limited by fatigue;Patient limited by endurance  Please refer to the flowsheet for vital signs taken during this treatment.  After treatment:   []  Patient left in no apparent distress sitting up in chair  [x]  Patient left in no apparent distress in bed  [x]  Call bell left within reach  []  Nursing notified  []  Caregiver present  []  Bed alarm activated    COMMUNICATION/EDUCATION:          Thank you for this referral.  YANETH Rodriguez  Minutes: 26

## 2024-08-22 NOTE — PLAN OF CARE
Problem: SLP Adult - Impaired Swallowing  Goal: By Discharge: Advance to least restrictive diet without signs or symptoms of aspiration for planned discharge setting.  See evaluation for individualized goals.  Description: Patient will:  1. Tolerate PO trials with 0 s/s overt distress in 4/5 trials- met 8/22/24  2. Utilize compensatory swallow strategies/maneuvers (decrease bite/sip, size/rate, alt. liq/sol) with min cues in 4/5 trials- N/A  3. Perform oral-motor/laryngeal exercises to increase oropharyngeal swallow function with min cues- N/A  4. Complete an objective swallow study (i.e., MBSS) to assess swallow integrity, r/o aspiration, and determine of safest LRD, min A as indicated/ordered by MD - N/A    Rec:     Regular diet with thin liquids  Aspiration precautions  HOB >45 during po intake, remain >30 for 30-45 minutes after po   Small bites/sips; alternate liquid/solid with slow feeding rate   Oral care TID  Meds per pt preference    Outcome: Completed   SPEECH LANGUAGE PATHOLOGY DYSPHAGIA TREATMENT & DISCHARGE    Patient: Jasmin Pcaheco (78 y.o. female)  Date: 8/22/2024  Diagnosis: Acute respiratory failure with hypoxia and hypercarbia (HCC) [J96.01, J96.02]  Acute hypercapnic respiratory failure (HCC) [J96.02] Acute respiratory failure with hypoxia and hypercarbia (HCC)  Procedure(s) (LRB):  BRONCHOSCOPY; NO C-ARM (N/A) 1 Day Post-Op  Precautions: Aspiration  PLOF: As per H&P     ASSESSMENT:  Pt seen at bedside for dysphagia management. Pt finishing lunch tray upon SLP arrival. Pt tolerated regular solids and thin liquids without any overt s/sx of aspiration across all PO trials. Strength, ROM, and coordination of orofacial musculature found to be WNL. Laryngeal elevation was functional to palpation with all PO trials. Mastication was adequate with timely a-p transit and positive oral clearance across all trials. Bronchoscopy yesterday with result of pt \"did not show any obvious mucous plugs or aspirate.\"  Pt educated on aspiration precautions and safe swallowing strategies- verbalized comprehension and suspect good carryover. Recommend regular solids and thin liquids, aspiration precautions, oral care TID, and meds as tolerated. Skilled therapy is no longer needed as pt has achieved least restrictive diet. SLP available for re-consult.     Progression toward goals:  [x]         Goals met/approximated  []         Not making progress/Not appropriate - will d/c POC     PLAN:  Recommendations and Planned Interventions:  Maximum therapeutic gains met; safest, least restrictive diet achieved. D/C ST intervention at this time.      SUBJECTIVE:   Patient stated “I hope you have a great day.”    OBJECTIVE:   Daily Assessment:  Baseline Assessment  Temperature Spikes Noted: No  Respiratory Status: O2 via nasual cannula  O2 Device: Nasal cannula  Liters of Oxygen: 3 L  Communication Observation: Functional  Follows Directions: Simple  Current Diet : Regular  Current Liquid Diet : Thin  Dentition: Adequate  Patient Positioning: Upright in bed  Baseline Vocal Quality: Normal  Volitional Cough: Strong    Orientation:  Person, Place, Time, and Situation    Dysphagia Treatment:  Oral Assessment:  Oral Motor   Labial: No impairment  Dentition: Limited, Natural  Oral Hygiene: Moist, Clean  Lingual: No impairment  Velum: Unable to visualize  Mandible: No impairment        P.O. Trials:   PO Trials  Neuromuscular Estim Used: No  Assessment Method(s): Observation  Patient Position: 70 at HOB  Vocal Quality: No Impairment  Consistency Presented: Regular, Thin  How Presented: SLP-fed/Presented, Straw, Successive Swallows  Bolus Acceptance: No impairment  Bolus Formation/Control: No impairment  Type of Impairment: Delayed, Mastication  Propulsion: No impairment  Oral Residue: None  Initiation of Swallow: No impairment  Laryngeal Elevation: Functional  Aspiration Signs/Symptoms: None  Pharyngeal Phase Characteristics: No impairment, issues,  or problems  Effective Modifications: Alternate liquids/solids, Small sips and bites  Cues for Modifications: None    PAIN:  Start of Tx: 0  End of Tx: 0     After treatment:   []              Patient left in no apparent distress sitting up in chair  [x]              Patient left in no apparent distress in bed  [x]              Call bell left within reach  [x]              Nursing notified  []              Caregiver present  []              Bed alarm activated      COMMUNICATION/EDUCATION:   [x]            Aspiration precautions; swallow safety; compensatory techniques provided via demonstration, verbalization and teach back of comprehension  [x]         Patient/family have participated as able in goal setting and plan of care.  []            Patient/family agree to work toward stated goals and plan of care.  []            Patient understands intent and goals of therapy, neutral about participation.  []            Patient unable to participate in goal setting/plan of care secondary to cognition, hearing/vision deficits; education ongoing with interdisciplinary staff   []            Handout regarding diet recommendations and thickener instructions provided.  [x]         Posted safety precautions in patient's room.    Thank you for this referral,     Janine Farr M.S., CF-SLP  Speech Language Pathologist

## 2024-08-23 LAB
ANION GAP SERPL CALC-SCNC: 7 MMOL/L (ref 3–18)
BACTERIA SPEC CULT: NORMAL
BASOPHILS # BLD: 0 K/UL (ref 0–0.1)
BASOPHILS NFR BLD: 0 % (ref 0–2)
BUN SERPL-MCNC: 28 MG/DL (ref 7–18)
BUN/CREAT SERPL: 21 (ref 12–20)
CALCIUM SERPL-MCNC: 8.3 MG/DL (ref 8.5–10.1)
CHLORIDE SERPL-SCNC: 99 MMOL/L (ref 100–111)
CO2 SERPL-SCNC: 32 MMOL/L (ref 21–32)
CREAT SERPL-MCNC: 1.32 MG/DL (ref 0.6–1.3)
DIFFERENTIAL METHOD BLD: ABNORMAL
EOSINOPHIL # BLD: 0.2 K/UL (ref 0–0.4)
EOSINOPHIL NFR BLD: 2 % (ref 0–5)
ERYTHROCYTE [DISTWIDTH] IN BLOOD BY AUTOMATED COUNT: 16.6 % (ref 11.6–14.5)
GLUCOSE BLD STRIP.AUTO-MCNC: 154 MG/DL (ref 70–110)
GLUCOSE BLD STRIP.AUTO-MCNC: 192 MG/DL (ref 70–110)
GLUCOSE BLD STRIP.AUTO-MCNC: 204 MG/DL (ref 70–110)
GLUCOSE BLD STRIP.AUTO-MCNC: 303 MG/DL (ref 70–110)
GLUCOSE SERPL-MCNC: 138 MG/DL (ref 74–99)
GRAM STN SPEC: NORMAL
HCT VFR BLD AUTO: 25.5 % (ref 35–45)
HGB BLD-MCNC: 8 G/DL (ref 12–16)
IMM GRANULOCYTES # BLD AUTO: 0.1 K/UL (ref 0–0.04)
IMM GRANULOCYTES NFR BLD AUTO: 1 % (ref 0–0.5)
LYMPHOCYTES # BLD: 2 K/UL (ref 0.9–3.6)
LYMPHOCYTES NFR BLD: 16 % (ref 21–52)
MAGNESIUM SERPL-MCNC: 2 MG/DL (ref 1.6–2.6)
MCH RBC QN AUTO: 28.9 PG (ref 24–34)
MCHC RBC AUTO-ENTMCNC: 31.4 G/DL (ref 31–37)
MCV RBC AUTO: 92.1 FL (ref 78–100)
MONOCYTES # BLD: 0.8 K/UL (ref 0.05–1.2)
MONOCYTES NFR BLD: 7 % (ref 3–10)
NEUTS SEG # BLD: 9.1 K/UL (ref 1.8–8)
NEUTS SEG NFR BLD: 75 % (ref 40–73)
NRBC # BLD: 0 K/UL (ref 0–0.01)
NRBC BLD-RTO: 0 PER 100 WBC
PHOSPHATE SERPL-MCNC: 2.6 MG/DL (ref 2.5–4.9)
PLATELET # BLD AUTO: 343 K/UL (ref 135–420)
PMV BLD AUTO: 10.1 FL (ref 9.2–11.8)
POTASSIUM SERPL-SCNC: 3.6 MMOL/L (ref 3.5–5.5)
RBC # BLD AUTO: 2.77 M/UL (ref 4.2–5.3)
SERVICE CMNT-IMP: NORMAL
SODIUM SERPL-SCNC: 138 MMOL/L (ref 136–145)
WBC # BLD AUTO: 12.2 K/UL (ref 4.6–13.2)

## 2024-08-23 PROCEDURE — 94660 CPAP INITIATION&MGMT: CPT

## 2024-08-23 PROCEDURE — 6360000002 HC RX W HCPCS: Performed by: INTERNAL MEDICINE

## 2024-08-23 PROCEDURE — 6370000000 HC RX 637 (ALT 250 FOR IP): Performed by: STUDENT IN AN ORGANIZED HEALTH CARE EDUCATION/TRAINING PROGRAM

## 2024-08-23 PROCEDURE — 6370000000 HC RX 637 (ALT 250 FOR IP): Performed by: HOSPITALIST

## 2024-08-23 PROCEDURE — 1100000000 HC RM PRIVATE

## 2024-08-23 PROCEDURE — 6370000000 HC RX 637 (ALT 250 FOR IP): Performed by: INTERNAL MEDICINE

## 2024-08-23 PROCEDURE — 2700000000 HC OXYGEN THERAPY PER DAY

## 2024-08-23 PROCEDURE — 6370000000 HC RX 637 (ALT 250 FOR IP): Performed by: NURSE PRACTITIONER

## 2024-08-23 PROCEDURE — 99231 SBSQ HOSP IP/OBS SF/LOW 25: CPT | Performed by: STUDENT IN AN ORGANIZED HEALTH CARE EDUCATION/TRAINING PROGRAM

## 2024-08-23 PROCEDURE — 85025 COMPLETE CBC W/AUTO DIFF WBC: CPT

## 2024-08-23 PROCEDURE — 2580000003 HC RX 258: Performed by: INTERNAL MEDICINE

## 2024-08-23 PROCEDURE — 36415 COLL VENOUS BLD VENIPUNCTURE: CPT

## 2024-08-23 PROCEDURE — 94640 AIRWAY INHALATION TREATMENT: CPT

## 2024-08-23 PROCEDURE — 83735 ASSAY OF MAGNESIUM: CPT

## 2024-08-23 PROCEDURE — 99231 SBSQ HOSP IP/OBS SF/LOW 25: CPT | Performed by: INTERNAL MEDICINE

## 2024-08-23 PROCEDURE — 94761 N-INVAS EAR/PLS OXIMETRY MLT: CPT

## 2024-08-23 PROCEDURE — 80048 BASIC METABOLIC PNL TOTAL CA: CPT

## 2024-08-23 PROCEDURE — 84100 ASSAY OF PHOSPHORUS: CPT

## 2024-08-23 PROCEDURE — 82962 GLUCOSE BLOOD TEST: CPT

## 2024-08-23 PROCEDURE — 94669 MECHANICAL CHEST WALL OSCILL: CPT

## 2024-08-23 RX ADMIN — ACETAMINOPHEN 325MG 650 MG: 325 TABLET ORAL at 21:26

## 2024-08-23 RX ADMIN — ALBUTEROL SULFATE 2.5 MG: 2.5 SOLUTION RESPIRATORY (INHALATION) at 16:51

## 2024-08-23 RX ADMIN — Medication 1 CAPSULE: at 08:29

## 2024-08-23 RX ADMIN — ACETYLCYSTEINE 400 MG: 100 SOLUTION ORAL; RESPIRATORY (INHALATION) at 16:50

## 2024-08-23 RX ADMIN — GUAIFENESIN 600 MG: 600 TABLET, EXTENDED RELEASE ORAL at 08:29

## 2024-08-23 RX ADMIN — INSULIN LISPRO 12 UNITS: 100 INJECTION, SOLUTION INTRAVENOUS; SUBCUTANEOUS at 16:21

## 2024-08-23 RX ADMIN — INSULIN GLARGINE 7 UNITS: 100 INJECTION, SOLUTION SUBCUTANEOUS at 08:29

## 2024-08-23 RX ADMIN — ALBUTEROL SULFATE 2.5 MG: 2.5 SOLUTION RESPIRATORY (INHALATION) at 07:59

## 2024-08-23 RX ADMIN — SODIUM CHLORIDE, PRESERVATIVE FREE 10 ML: 5 INJECTION INTRAVENOUS at 21:28

## 2024-08-23 RX ADMIN — Medication 100 MG: at 08:29

## 2024-08-23 RX ADMIN — GUAIFENESIN 600 MG: 600 TABLET, EXTENDED RELEASE ORAL at 21:25

## 2024-08-23 RX ADMIN — ALBUTEROL SULFATE 2.5 MG: 2.5 SOLUTION RESPIRATORY (INHALATION) at 19:25

## 2024-08-23 RX ADMIN — SODIUM CHLORIDE, PRESERVATIVE FREE 10 ML: 5 INJECTION INTRAVENOUS at 08:30

## 2024-08-23 RX ADMIN — INSULIN LISPRO 4 UNITS: 100 INJECTION, SOLUTION INTRAVENOUS; SUBCUTANEOUS at 12:30

## 2024-08-23 RX ADMIN — ACETYLCYSTEINE 400 MG: 100 SOLUTION ORAL; RESPIRATORY (INHALATION) at 19:25

## 2024-08-23 RX ADMIN — ALBUTEROL SULFATE 2.5 MG: 2.5 SOLUTION RESPIRATORY (INHALATION) at 04:40

## 2024-08-23 RX ADMIN — ACETYLCYSTEINE 400 MG: 100 SOLUTION ORAL; RESPIRATORY (INHALATION) at 12:21

## 2024-08-23 RX ADMIN — ALBUTEROL SULFATE 2.5 MG: 2.5 SOLUTION RESPIRATORY (INHALATION) at 12:21

## 2024-08-23 RX ADMIN — AMLODIPINE BESYLATE 5 MG: 5 TABLET ORAL at 08:29

## 2024-08-23 RX ADMIN — ACETYLCYSTEINE 400 MG: 100 SOLUTION ORAL; RESPIRATORY (INHALATION) at 07:59

## 2024-08-23 RX ADMIN — FUROSEMIDE 40 MG: 40 TABLET ORAL at 08:29

## 2024-08-23 RX ADMIN — FOLIC ACID 1 MG: 1 TABLET ORAL at 08:29

## 2024-08-23 ASSESSMENT — PAIN SCALES - GENERAL
PAINLEVEL_OUTOF10: 0

## 2024-08-23 ASSESSMENT — PAIN SCALES - WONG BAKER: WONGBAKER_NUMERICALRESPONSE: NO HURT

## 2024-08-23 ASSESSMENT — PAIN DESCRIPTION - DESCRIPTORS: DESCRIPTORS: ACHING

## 2024-08-23 NOTE — PROGRESS NOTES
08/23/24 0803   Treatment   $Bronchial Hygiene   (metaneb not done. pt wanting to stay on bipap. feels comfortable . denies sob)

## 2024-08-23 NOTE — PROGRESS NOTES
Progress Note  Pulmonary, Critical Care, and Sleep Medicine    Name: Jasmin Pacheco MRN: [id number]   : 1945 Hospital: [facility]   Date: 2024        IMPRESSION:   Acute on chronic hypoxic and hypercapnic respiratory failure requiring intubation due to acute heart failure and likely mild ARDS - s/p extubation - on HFNC & BiPAP. On LTOT - 2 LPM  COVID pneumonia: dx 24.  Severe sepsis due to above, POA, possible bacterial infection superimposed on COVID-pneumonia; resolved  Abnormal CT: Complete atelectasis of the right lung secondary to mucus or debris in trachea & right bronchial tree. S/p Bronch () with clearance of tracheobronchial secretions; endobronchial mucosa hyperemic & edematous.  Metabolic encephalopathy - likely toxic/metabolic; resolved  Acute on chronic diastolic heart failure (ECHO 24), grade 2 diastolic dysfunction with an ejection fraction of 55 to 60%, mod pHTN  Contraction alkalosis due to diuresis - resolved  Hypoglycemia - resolved  LEA on CKD 3B  Morbid obesity, Body mass index is 48.92 kg/m².  T2DM   Hx PE - on eliquis outpatient w/ VQ scan \"high probability\" of PE 24       PLAN:   Supplemental oxygen to maintain SpO2 >88%; avoid hyperoxygenation - weaned to 3L/min; continue BIPAP ; 35% FIO2 with sleep.  Rpt CXR yesterday evening with interval improvement of right lung aeration  Continue Mucomyst q4hrs; 3% saline Q12hrs and nebs. Ct Mucinex.  Rec keep off Right side  Follow-up bronch cultures, NGTD  Continue diuretic regimen- on Lasix 40 mg daily - suspect hypervolemia likely etiology of persistent hypoxia / need for supplemental O2; monitor   S/p Prednisone x5 day course  Abx - defer to primary service  Bronchodilators - prn  Please assess for home oxygen need prior to discharge  Aggressive pulmonary toileting/bronchial hygiene  Frequent incentive spirometry and flutter valve - encourage use  Aspiration precautions including elevating HOB  Technically difficult study with poor endocardial visualization, limited Doppler study due to patient's condition and procedure performed with the patient in a sitting position.  Patient ventilated.    Left Ventricle: Normal left ventricular systolic function with a visually estimated EF of 55 - 60%. EF by 2D Simpsons Biplane is 58%. Left ventricle size is normal. Unable to assess wall thickness. No obvious regional wall motion abnormalities. Grade II diastolic dysfunction with increased LAP.    Right Ventricle: Not well visualized. Right ventricle is dilated.  Significantly dilated by visual estimate.    Tricuspid Valve: Valve structure is normal. Mild regurgitation. No stenosis noted. Unable to assess RVSP.  Patient ventilated.  Moderate pulmonary hypertension likely present.    Right Atrium: Not well visualized. Right atrium is dilated by visual estimate.    Signed by: Jaleel Delgado MD on 8/6/2024  3:20 PM    High complexity decision making was performed during this consultation and evaluation. Critical care time exclusive of procedures spent managing the patient:      minutes    Brandy Hubbard MD

## 2024-08-23 NOTE — PROGRESS NOTES
RT at [facility] efor assessmnet. Pt states she wi;; be read to wear bilevel therapy around 2200pm. Will return to place pt on therapy. No signs of distress noted.   08/23/24 1930   NIV Type   Ventilator ID [id number]   NIV Started/Stopped Off   Equipment Type Resmed   Mode Bilevel   Mask Type Full face mask   Mask Size Medium   Bonnet size Medium   Assessment   Pulse 84   Heart Rate Source Monitor   Breath Sounds   Respiratory Pattern Regular   Right Upper Lobe Diminished;Coarse crackles   Right Middle Lobe Diminished;Coarse crackles   Right Lower Lobe Diminished;Coarse crackles   Left Upper Lobe Diminished;Coarse crackles   Left Lower Lobe Diminished;Coarse crackles   Settings/Measurements   IPAP 16 cmH20   CPAP/EPAP 8 cmH2O

## 2024-08-23 NOTE — PLAN OF CARE
Problem: Safety - Adult  Goal: Free from fall injury  8/23/2024 1019 by Fabi Ayala, RN  Outcome: Progressing  8/22/2024 2059 by Edgard Ley RN  Outcome: Progressing     Problem: Chronic Conditions and Co-morbidities  Goal: Patient's chronic conditions and co-morbidity symptoms are monitored and maintained or improved  8/23/2024 1019 by Fabi Ayala RN  Outcome: Progressing  Flowsheets (Taken 8/23/2024 0845)  Care Plan - Patient's Chronic Conditions and Co-Morbidity Symptoms are Monitored and Maintained or Improved: Monitor and assess patient's chronic conditions and comorbid symptoms for stability, deterioration, or improvement  8/22/2024 2059 by Edgard Ley RN  Outcome: Progressing  Flowsheets  Taken 8/22/2024 1937 by Edgard Ley, RN  Care Plan - Patient's Chronic Conditions and Co-Morbidity Symptoms are Monitored and Maintained or Improved:   Monitor and assess patient's chronic conditions and comorbid symptoms for stability, deterioration, or improvement   Collaborate with multidisciplinary team to address chronic and comorbid conditions and prevent exacerbation or deterioration   Update acute care plan with appropriate goals if chronic or comorbid symptoms are exacerbated and prevent overall improvement and discharge  Taken 8/22/2024 0840 by Fabi Ayala, RN  Care Plan - Patient's Chronic Conditions and Co-Morbidity Symptoms are Monitored and Maintained or Improved: Monitor and assess patient's chronic conditions and comorbid symptoms for stability, deterioration, or improvement

## 2024-08-23 NOTE — PROGRESS NOTES
Time: 08/23/24  1:41 AM   Result Value Ref Range    WBC 12.2 4.6 - 13.2 K/uL    RBC 2.77 (L) 4.20 - 5.30 M/uL    Hemoglobin 8.0 (L) 12.0 - 16.0 g/dL    Hematocrit 25.5 (L) 35.0 - 45.0 %    MCV 92.1 78.0 - 100.0 FL    MCH 28.9 24.0 - 34.0 PG    MCHC 31.4 31.0 - 37.0 g/dL    RDW 16.6 (H) 11.6 - 14.5 %    Platelets 343 135 - 420 K/uL    MPV 10.1 9.2 - 11.8 FL    Nucleated RBCs 0.0 0  WBC    nRBC 0.00 0.00 - 0.01 K/uL    Neutrophils % 75 (H) 40 - 73 %    Lymphocytes % 16 (L) 21 - 52 %    Monocytes % 7 3 - 10 %    Eosinophils % 2 0 - 5 %    Basophils % 0 0 - 2 %    Immature Granulocytes % 1 (H) 0.0 - 0.5 %    Neutrophils Absolute 9.1 (H) 1.8 - 8.0 K/UL    Lymphocytes Absolute 2.0 0.9 - 3.6 K/UL    Monocytes Absolute 0.8 0.05 - 1.2 K/UL    Eosinophils Absolute 0.2 0.0 - 0.4 K/UL    Basophils Absolute 0.0 0.0 - 0.1 K/UL    Immature Granulocytes Absolute 0.1 (H) 0.00 - 0.04 K/UL    Differential Type AUTOMATED     Basic Metabolic Panel    Collection Time: 08/23/24  1:41 AM   Result Value Ref Range    Sodium 138 136 - 145 mmol/L    Potassium 3.6 3.5 - 5.5 mmol/L    Chloride 99 (L) 100 - 111 mmol/L    CO2 32 21 - 32 mmol/L    Anion Gap 7 3.0 - 18 mmol/L    Glucose 138 (H) 74 - 99 mg/dL    BUN 28 (H) 7.0 - 18 MG/DL    Creatinine 1.32 (H) 0.6 - 1.3 MG/DL    BUN/Creatinine Ratio 21 (H) 12 - 20      Est, Glom Filt Rate 42 (L) >60 ml/min/1.73m2    Calcium 8.3 (L) 8.5 - 10.1 MG/DL   Magnesium    Collection Time: 08/23/24  1:41 AM   Result Value Ref Range    Magnesium 2.0 1.6 - 2.6 mg/dL   Phosphorus    Collection Time: 08/23/24  1:41 AM   Result Value Ref Range    Phosphorus 2.6 2.5 - 4.9 MG/DL   POCT Glucose    Collection Time: 08/23/24  6:54 AM   Result Value Ref Range    POC Glucose 154 (H) 70 - 110 mg/dL   POCT Glucose    Collection Time: 08/23/24 11:49 AM   Result Value Ref Range    POC Glucose 204 (H) 70 - 110 mg/dL   POCT Glucose    Collection Time: 08/23/24  4:09 PM   Result Value Ref Range    POC Glucose 303 (H) 70 -  110 mg/dL         Signed By: Keith Rossi MD

## 2024-08-23 NOTE — PLAN OF CARE
Problem: Safety - Adult  Goal: Free from fall injury  8/22/2024 2059 by Edgard Ley, RN  Outcome: Progressing  8/22/2024 0936 by Fabi Ayala, RN  Outcome: Progressing     Problem: Chronic Conditions and Co-morbidities  Goal: Patient's chronic conditions and co-morbidity symptoms are monitored and maintained or improved  Outcome: Progressing  Flowsheets (Taken 8/22/2024 0840 by Fabi Ayala, RN)  Care Plan - Patient's Chronic Conditions and Co-Morbidity Symptoms are Monitored and Maintained or Improved: Monitor and assess patient's chronic conditions and comorbid symptoms for stability, deterioration, or improvement     Problem: Discharge Planning  Goal: Discharge to home or other facility with appropriate resources  Outcome: Progressing  Flowsheets (Taken 8/22/2024 0840 by Fabi Ayala, RN)  Discharge to home or other facility with appropriate resources: Identify barriers to discharge with patient and caregiver     Problem: Pain  Goal: Verbalizes/displays adequate comfort level or baseline comfort level  Outcome: Progressing     Problem: ABCDS Injury Assessment  Goal: Absence of physical injury  Outcome: Progressing     Problem: Nutrition Deficit:  Goal: Optimize nutritional status  Outcome: Progressing     Problem: Skin/Tissue Integrity  Goal: Absence of new skin breakdown  Description: 1.  Monitor for areas of redness and/or skin breakdown  2.  Assess vascular access sites hourly  3.  Every 4-6 hours minimum:  Change oxygen saturation probe site  4.  Every 4-6 hours:  If on nasal continuous positive airway pressure, respiratory therapy assess nares and determine need for appliance change or resting period.  Outcome: Progressing     Problem: Occupational Therapy - Adult  Goal: By Discharge: Performs self-care activities at highest level of function for planned discharge setting.  See evaluation for individualized goals.  Description: Occupational Therapy Goals:  Initiated 8/12/2024 to be met

## 2024-08-23 NOTE — PROGRESS NOTES
Metaneb completed for 10 min. Patient tolerated therapy well   08/23/24 1234   Treatment   $Bronchial Hygiene $Oscillatory therapy

## 2024-08-23 NOTE — PROGRESS NOTES
Metaneb completed for ten min. Patient tolerated well   08/23/24 4104   Treatment   $Bronchial Hygiene $Oscillatory therapy

## 2024-08-24 VITALS
TEMPERATURE: 98.5 F | RESPIRATION RATE: 19 BRPM | DIASTOLIC BLOOD PRESSURE: 54 MMHG | WEIGHT: 293 LBS | HEART RATE: 90 BPM | SYSTOLIC BLOOD PRESSURE: 117 MMHG | HEIGHT: 67 IN | OXYGEN SATURATION: 100 % | BODY MASS INDEX: 45.99 KG/M2

## 2024-08-24 LAB
ACID FAST STN SPEC: NEGATIVE
ANION GAP SERPL CALC-SCNC: 7 MMOL/L (ref 3–18)
BASOPHILS # BLD: 0 K/UL (ref 0–0.1)
BASOPHILS NFR BLD: 0 % (ref 0–2)
BUN SERPL-MCNC: 23 MG/DL (ref 7–18)
BUN/CREAT SERPL: 21 (ref 12–20)
CALCIUM SERPL-MCNC: 8 MG/DL (ref 8.5–10.1)
CHLORIDE SERPL-SCNC: 98 MMOL/L (ref 100–111)
CO2 SERPL-SCNC: 32 MMOL/L (ref 21–32)
CREAT SERPL-MCNC: 1.08 MG/DL (ref 0.6–1.3)
DIFFERENTIAL METHOD BLD: ABNORMAL
EOSINOPHIL # BLD: 0.3 K/UL (ref 0–0.4)
EOSINOPHIL NFR BLD: 3 % (ref 0–5)
ERYTHROCYTE [DISTWIDTH] IN BLOOD BY AUTOMATED COUNT: 16.8 % (ref 11.6–14.5)
GLUCOSE BLD STRIP.AUTO-MCNC: 159 MG/DL (ref 70–110)
GLUCOSE BLD STRIP.AUTO-MCNC: 188 MG/DL (ref 70–110)
GLUCOSE BLD STRIP.AUTO-MCNC: 194 MG/DL (ref 70–110)
GLUCOSE BLD STRIP.AUTO-MCNC: 251 MG/DL (ref 70–110)
GLUCOSE SERPL-MCNC: 149 MG/DL (ref 74–99)
HCT VFR BLD AUTO: 26.5 % (ref 35–45)
HGB BLD-MCNC: 8 G/DL (ref 12–16)
IMM GRANULOCYTES # BLD AUTO: 0 K/UL (ref 0–0.04)
IMM GRANULOCYTES NFR BLD AUTO: 0 % (ref 0–0.5)
LYMPHOCYTES # BLD: 1.6 K/UL (ref 0.9–3.6)
LYMPHOCYTES NFR BLD: 15 % (ref 21–52)
MAGNESIUM SERPL-MCNC: 2 MG/DL (ref 1.6–2.6)
MCH RBC QN AUTO: 27.9 PG (ref 24–34)
MCHC RBC AUTO-ENTMCNC: 30.2 G/DL (ref 31–37)
MCV RBC AUTO: 92.3 FL (ref 78–100)
MONOCYTES # BLD: 0.7 K/UL (ref 0.05–1.2)
MONOCYTES NFR BLD: 7 % (ref 3–10)
MYCOBACTERIUM SPEC QL CULT: NORMAL
NEUTS SEG # BLD: 7.7 K/UL (ref 1.8–8)
NEUTS SEG NFR BLD: 75 % (ref 40–73)
NRBC # BLD: 0 K/UL (ref 0–0.01)
NRBC BLD-RTO: 0 PER 100 WBC
PHOSPHATE SERPL-MCNC: 2.8 MG/DL (ref 2.5–4.9)
PLATELET # BLD AUTO: 349 K/UL (ref 135–420)
PMV BLD AUTO: 9.8 FL (ref 9.2–11.8)
POTASSIUM SERPL-SCNC: 3.8 MMOL/L (ref 3.5–5.5)
RBC # BLD AUTO: 2.87 M/UL (ref 4.2–5.3)
SODIUM SERPL-SCNC: 137 MMOL/L (ref 136–145)
SPECIMEN PREPARATION: NORMAL
SPECIMEN SOURCE: NORMAL
WBC # BLD AUTO: 10.3 K/UL (ref 4.6–13.2)

## 2024-08-24 PROCEDURE — 2580000003 HC RX 258: Performed by: INTERNAL MEDICINE

## 2024-08-24 PROCEDURE — 6370000000 HC RX 637 (ALT 250 FOR IP): Performed by: STUDENT IN AN ORGANIZED HEALTH CARE EDUCATION/TRAINING PROGRAM

## 2024-08-24 PROCEDURE — 94761 N-INVAS EAR/PLS OXIMETRY MLT: CPT

## 2024-08-24 PROCEDURE — 82962 GLUCOSE BLOOD TEST: CPT

## 2024-08-24 PROCEDURE — 83735 ASSAY OF MAGNESIUM: CPT

## 2024-08-24 PROCEDURE — 84100 ASSAY OF PHOSPHORUS: CPT

## 2024-08-24 PROCEDURE — 2700000000 HC OXYGEN THERAPY PER DAY

## 2024-08-24 PROCEDURE — 99239 HOSP IP/OBS DSCHRG MGMT >30: CPT | Performed by: STUDENT IN AN ORGANIZED HEALTH CARE EDUCATION/TRAINING PROGRAM

## 2024-08-24 PROCEDURE — 85025 COMPLETE CBC W/AUTO DIFF WBC: CPT

## 2024-08-24 PROCEDURE — 94640 AIRWAY INHALATION TREATMENT: CPT

## 2024-08-24 PROCEDURE — 80048 BASIC METABOLIC PNL TOTAL CA: CPT

## 2024-08-24 PROCEDURE — 6360000002 HC RX W HCPCS: Performed by: INTERNAL MEDICINE

## 2024-08-24 PROCEDURE — 6370000000 HC RX 637 (ALT 250 FOR IP): Performed by: INTERNAL MEDICINE

## 2024-08-24 PROCEDURE — 36415 COLL VENOUS BLD VENIPUNCTURE: CPT

## 2024-08-24 PROCEDURE — 6370000000 HC RX 637 (ALT 250 FOR IP): Performed by: NURSE PRACTITIONER

## 2024-08-24 PROCEDURE — 94669 MECHANICAL CHEST WALL OSCILL: CPT

## 2024-08-24 RX ORDER — AMLODIPINE BESYLATE 5 MG/1
5 TABLET ORAL DAILY
Qty: 30 TABLET | Refills: 3 | Status: SHIPPED | OUTPATIENT
Start: 2024-08-25

## 2024-08-24 RX ADMIN — INSULIN LISPRO 8 UNITS: 100 INJECTION, SOLUTION INTRAVENOUS; SUBCUTANEOUS at 12:13

## 2024-08-24 RX ADMIN — FOLIC ACID 1 MG: 1 TABLET ORAL at 09:38

## 2024-08-24 RX ADMIN — ALBUTEROL SULFATE 2.5 MG: 2.5 SOLUTION RESPIRATORY (INHALATION) at 14:54

## 2024-08-24 RX ADMIN — ALBUTEROL SULFATE 2.5 MG: 2.5 SOLUTION RESPIRATORY (INHALATION) at 04:04

## 2024-08-24 RX ADMIN — INSULIN GLARGINE 7 UNITS: 100 INJECTION, SOLUTION SUBCUTANEOUS at 10:03

## 2024-08-24 RX ADMIN — ACETYLCYSTEINE 400 MG: 100 SOLUTION ORAL; RESPIRATORY (INHALATION) at 07:42

## 2024-08-24 RX ADMIN — GUAIFENESIN 600 MG: 600 TABLET, EXTENDED RELEASE ORAL at 09:38

## 2024-08-24 RX ADMIN — ACETYLCYSTEINE 400 MG: 100 SOLUTION ORAL; RESPIRATORY (INHALATION) at 14:54

## 2024-08-24 RX ADMIN — ALBUTEROL SULFATE 2.5 MG: 2.5 SOLUTION RESPIRATORY (INHALATION) at 00:15

## 2024-08-24 RX ADMIN — ALBUTEROL SULFATE 2.5 MG: 2.5 SOLUTION RESPIRATORY (INHALATION) at 07:42

## 2024-08-24 RX ADMIN — FUROSEMIDE 40 MG: 40 TABLET ORAL at 09:38

## 2024-08-24 RX ADMIN — Medication 100 MG: at 09:38

## 2024-08-24 RX ADMIN — ACETAMINOPHEN 325MG 650 MG: 325 TABLET ORAL at 10:03

## 2024-08-24 RX ADMIN — ACETYLCYSTEINE 400 MG: 100 SOLUTION ORAL; RESPIRATORY (INHALATION) at 11:53

## 2024-08-24 RX ADMIN — AMLODIPINE BESYLATE 5 MG: 5 TABLET ORAL at 10:03

## 2024-08-24 RX ADMIN — ACETYLCYSTEINE 400 MG: 100 SOLUTION ORAL; RESPIRATORY (INHALATION) at 00:15

## 2024-08-24 RX ADMIN — Medication 1 CAPSULE: at 09:38

## 2024-08-24 RX ADMIN — ALBUTEROL SULFATE 2.5 MG: 2.5 SOLUTION RESPIRATORY (INHALATION) at 11:53

## 2024-08-24 RX ADMIN — SODIUM CHLORIDE, PRESERVATIVE FREE 10 ML: 5 INJECTION INTRAVENOUS at 09:48

## 2024-08-24 RX ADMIN — ACETYLCYSTEINE 400 MG: 100 SOLUTION ORAL; RESPIRATORY (INHALATION) at 04:04

## 2024-08-24 ASSESSMENT — PAIN SCALES - GENERAL
PAINLEVEL_OUTOF10: 6
PAINLEVEL_OUTOF10: 0

## 2024-08-24 ASSESSMENT — PAIN DESCRIPTION - LOCATION: LOCATION: LEG

## 2024-08-24 ASSESSMENT — PAIN DESCRIPTION - ONSET: ONSET: ON-GOING

## 2024-08-24 ASSESSMENT — PAIN - FUNCTIONAL ASSESSMENT: PAIN_FUNCTIONAL_ASSESSMENT: PREVENTS OR INTERFERES SOME ACTIVE ACTIVITIES AND ADLS

## 2024-08-24 ASSESSMENT — PAIN DESCRIPTION - PAIN TYPE: TYPE: ACUTE PAIN

## 2024-08-24 ASSESSMENT — PAIN DESCRIPTION - FREQUENCY: FREQUENCY: INTERMITTENT

## 2024-08-24 ASSESSMENT — PAIN DESCRIPTION - ORIENTATION: ORIENTATION: RIGHT;LEFT

## 2024-08-24 ASSESSMENT — PAIN DESCRIPTION - DESCRIPTORS: DESCRIPTORS: ACHING

## 2024-08-24 NOTE — PROGRESS NOTES
Patient is not ready for night time BiPAP use, will notify RN when ready for use.    LISINOPRIL 2.5 MG TABLET: 1 tab(s) orally once a day  NAMZARIC 14 MG-10 MG CAPSULE: 1 tab(s) orally once a day  RISPERIDONE 0.25 MG TABLET: 1 tab(s) orally once a day (at bedtime)   NAMZARIC 14 MG-10 MG CAPSULE: 1 tab(s) orally once a day  RISPERIDONE 0.25 MG TABLET: 1 tab(s) orally once a day (at bedtime)

## 2024-08-24 NOTE — CARE COORDINATION
[employer] med[employer]  transport has been arranged to pt home address on file,  TRIP ID# [id number] ,     Dispatch will call [facility] nurses station with available ETA    Discharge packet with face sheet and pcs form with bedside chart.

## 2024-08-24 NOTE — DISCHARGE SUMMARY
Discharge Summary    Patient: Jasmin Pacheco MRN: [id number]  CSN: [id number]    YOB: 1945  Age: 78 y.o.  Sex: female    DOA: 8/5/2024 LOS:  LOS: 19 days   Discharge Date:  ***     Admission Diagnosis: Acute respiratory failure with hypoxia and hypercarbia (HCC) [J96.01, J96.02]  Acute hypercapnic respiratory failure (HCC) [J96.02]    Discharge Diagnosis:  ***    Discharge Condition: Stable    Discharge Disposition: {DISCHARGE MANAGEMENT:26793}    PHYSICAL EXAM    Visit Vitals  BP (!) 136/54   Pulse 86   Temp 98.2 °F (36.8 °C) (Oral)   Resp 20   Ht 1.702 m (5' 7\")   Wt 136 kg (299 lb 13.2 oz)   SpO2 97%   BMI 46.96 kg/m²     ***  General: Alert, cooperative, no acute distress    HEENT: NC, Atraumatic.  PERRLA, EOMI. Anicteric sclerae.  Lungs:  CTA Bilaterally. No Wheezing/Rhonchi/Rales.  Heart:  Regular  rhythm,  No murmur, No Rubs, No Gallops  Abdomen: Soft, Non distended, Non tender.  +Bowel sounds, no HSM  Extremities: No c/c/e  Psych:   Good insight. Not anxious or agitated.  Neurologic:  CN 2-12 grossly intact, oriented X ***.  No acute neurological deficits,     Hospital Course By Problem:   ***    Consults: ***    Significant Diagnostic Studies: Xray Result (most recent):  XR CHEST LIMITED ONE VIEW 08/22/2024    Narrative  STUDY:  XR CHEST 1 VIEW.    DATE OF STUDY: 8/22/2024    HISTORY: Respiratory failure.    COMPARISONS: 8/21/2024    TECHNIQUE:  Portable AP view(s) of the chest.    Impression  FINDINGS/IMPRESSION:    Life support tubes/lines: None    Heart and mediastinum: Stable enlargement of the cardiomediastinal silhouette,  which is partially obscured.    Lungs: There is ongoing right middle lobe and right lower lobe  collapse/consolidation. Nonspecific prominence of the interstitial markings.  While this may be due to edema and/or atelectasis, an underlying infectious or  inflammatory process cannot be excluded.    Pleura: Moderate size right-sided pleural effusion. No  known as: LIPITOR     furosemide 40 MG tablet  Commonly known as: LASIX  Take 1 tablet by mouth 2 times daily     glipiZIDE 5 MG extended release tablet  Commonly known as: GLUCOTROL XL            STOP taking these medications      triamcinolone 0.1 % cream  Commonly known as: KENALOG               Where to Get Your Medications        These medications were sent to [facility] [address] [place] [address] [address] [facility] -  [phone] - F [phone] [address] [facility] [address]      Phone: [phone]   amLODIPine 5 MG tablet         Activity: activity as tolerated    Functional status and cognitive function:    Functionally bedbound secondary to debility secondary to hospitalization  Status: alert, appears stated age, cooperative, and morbidly obese    Diet: cardiac diet    Wound Care: as directed        Follow-up: with PCP, Edgard Harrington MD in 7-10days      Minutes spent on discharge: >30 minutes spent coordinating this discharge (review instructions/follow-up, prescriptions, preparing report for sign off)

## 2024-08-24 NOTE — CARE COORDINATION
CM spoke with patient at bedside, she agrees with d/c plan.   [facility] home [facility] accepted patient. Patient given contact info for [facility].       [facility] [employer]     Letter of medically necessity will need to be signed by physician.     BRENDAN notified Helen to follow up in AM.

## 2024-08-24 NOTE — PLAN OF CARE
Problem: Safety - Adult  Goal: Free from fall injury  8/23/2024 2304 by Yessi Lord, RN  Outcome: Progressing  8/23/2024 1019 by Fabi Ayala, RN  Outcome: Progressing     Problem: Chronic Conditions and Co-morbidities  Goal: Patient's chronic conditions and co-morbidity symptoms are monitored and maintained or improved  8/23/2024 2304 by Yessi Lord, RN  Outcome: Progressing  8/23/2024 1019 by Fabi Ayala, RN  Outcome: Progressing  Flowsheets (Taken 8/23/2024 0845)  Care Plan - Patient's Chronic Conditions and Co-Morbidity Symptoms are Monitored and Maintained or Improved: Monitor and assess patient's chronic conditions and comorbid symptoms for stability, deterioration, or improvement     Problem: Discharge Planning  Goal: Discharge to home or other facility with appropriate resources  Outcome: Progressing     Problem: Pain  Goal: Verbalizes/displays adequate comfort level or baseline comfort level  Outcome: Progressing     Problem: ABCDS Injury Assessment  Goal: Absence of physical injury  Outcome: Progressing     Problem: Nutrition Deficit:  Goal: Optimize nutritional status  Outcome: Progressing     Problem: Skin/Tissue Integrity  Goal: Absence of new skin breakdown  Description: 1.  Monitor for areas of redness and/or skin breakdown  2.  Assess vascular access sites hourly  3.  Every 4-6 hours minimum:  Change oxygen saturation probe site  4.  Every 4-6 hours:  If on nasal continuous positive airway pressure, respiratory therapy assess nares and determine need for appliance change or resting period.  Outcome: Progressing

## 2024-08-25 NOTE — PROGRESS NOTES
Discharge orders received for the patient.   Discharge instruction discussed with patient with adequate time given for any questions to be asked and answered.   Copy of discharge papers given to transportation for the patient.    PIV and telemetry removed without complication.    Armbands removed and shredded    Patient transported home via medicaid rides.

## 2024-08-27 LAB
BACTERIA SPEC CULT: ABNORMAL
BACTERIA SPEC CULT: ABNORMAL
SERVICE CMNT-IMP: ABNORMAL

## 2024-08-27 NOTE — CARE COORDINATION
ACTUAL DATE and TIME is 26AUG2024 2022.Certificate of Medical Necessity for DME of a Semi Electrical bed, MWC  and accessories faxed to [employer] at [phone] with success receipt.

## 2024-10-06 LAB
ACID FAST STN SPEC: NEGATIVE
MYCOBACTERIUM SPEC QL CULT: NEGATIVE
SPECIMEN PREPARATION: NORMAL
SPECIMEN SOURCE: NORMAL

## (undated) DEVICE — STERILE POLYISOPRENE POWDER-FREE SURGICAL GLOVES: Brand: PROTEXIS

## (undated) DEVICE — CONTAINER FORMALIN PREFILLED 10% NBF 40ML

## (undated) DEVICE — MASK SURG REG ORNG LEV 3 SFTY SEAL 4 LAYR SFT INNR LINING

## (undated) DEVICE — 1860S HEALTH CARE RESPIRATOR N95 120EA/C: Brand: 3M™

## (undated) DEVICE — BITE BLOCK ENDOSCP UNIV AD 6 TO 9.4 MM

## (undated) DEVICE — SET ADMIN L104IN 20 GTT GRAV RLER CLMP SMRT SITE NDL FREE

## (undated) DEVICE — NEEDLE ASPIR INNR 21GA OUTER 19GA L3X15MM TRNSBRONCH

## (undated) DEVICE — CATHETER SUCT TR FL TIP 14FR W/ O CTRL

## (undated) DEVICE — SINGLE USE BIOPSY VALVE MAJ-210: Brand: SINGLE USE BIOPSY VALVE (STERILE)

## (undated) DEVICE — YANKAUER,SMOOTH HANDLE,HIGH CAPACITY: Brand: MEDLINE INDUSTRIES, INC.

## (undated) DEVICE — DRAPE TWL SURG 16X26IN BLU ORB04] ALLCARE INC]

## (undated) DEVICE — MEDI-VAC NON-CONDUCTIVE SUCTION TUBING: Brand: CARDINAL HEALTH

## (undated) DEVICE — GOWN PLASTIC FILM THMBHKS UNIV BLUE: Brand: CARDINAL HEALTH

## (undated) DEVICE — SLEEVE COMPR STD 12 IN FOR 165IN CALF COMFORT VENODYNE SYS

## (undated) DEVICE — BASIN EMSIS 16OZ GRAPHITE PLAS KID SHP MOLD GRAD FOR ORAL

## (undated) DEVICE — BRUSH CYTO L140CM DIA1.5MM WRK CHN 2MM BRIST SHTH RNG HNDL

## (undated) DEVICE — NEEDLE ASPIR TRNSBRONCH 1.8 MM 21 GAX15 MM 130 CM EXCELON

## (undated) DEVICE — FORCEPS BX L100CM DIA1.8MM WRK CHN 2MM PULM S STL RAD JAW 4

## (undated) DEVICE — BLADE, TONGUE, 6", STERILE: Brand: MEDLINE

## (undated) DEVICE — NEBULIZER,KIT,T-MOUTHPIECE,6"RESER,7'TUB: Brand: MEDLINE

## (undated) DEVICE — 1860 HEALTH CARE N95 MASK, 20EACH/BOX  6 BX/C: Brand: 3M™

## (undated) DEVICE — BE 105-8 BRONCHOSCOPE SWIVEL - 15MM ID/22MM OD (PATIENT PORT) X15MM OD (EQUIPMENT PORT). REUSABLE.  FITS COMPONENTS OF ADULT VENTILATOR CIRCUITS.  MOLDED OF POLYETHERIMIDE. INCLUDES TWO SILICONE RUBBER CAPS; ONE CAP ALLOWS FOR THE USE OF A SUCTIONING CATHETER WHILE THE OTHER CAP ALLOWS FOR THE USE OF A FIBER-OPTIC BRONCHOSCOPE WITHOUT SIGNIFICANT LOSS OF PEEP.: Brand: BE 105-8 BRONCHOSCOPE SWIVEL

## (undated) DEVICE — SINGLE USE SUCTION VALVE MAJ-209: Brand: SINGLE USE SUCTION VALVE (STERILE)

## (undated) DEVICE — SYRINGE MED 10ML SLIP TIP BLNT FILL AND LUERLOCK DISP

## (undated) DEVICE — CANNULA NSL AD TBNG L14FT STD PVC O2 CRV CONN NONFLARED NSL

## (undated) DEVICE — APPLICATOR FBR TIP L6IN COT TIP WOOD SHFT SWAB 2000 PER CA

## (undated) DEVICE — LINER SUCT CANSTR 3000CC PLAS SFT PRE ASSEMB W/OUT TBNG W/